# Patient Record
Sex: FEMALE | Race: BLACK OR AFRICAN AMERICAN | Employment: FULL TIME | URBAN - METROPOLITAN AREA
[De-identification: names, ages, dates, MRNs, and addresses within clinical notes are randomized per-mention and may not be internally consistent; named-entity substitution may affect disease eponyms.]

---

## 2017-07-19 ENCOUNTER — HOSPITAL ENCOUNTER (OUTPATIENT)
Dept: MAMMOGRAPHY | Age: 52
Discharge: HOME | End: 2017-07-19
Attending: FAMILY MEDICINE
Payer: COMMERCIAL

## 2017-07-19 DIAGNOSIS — Z12.31 VISIT FOR SCREENING MAMMOGRAM: ICD-10-CM

## 2017-07-19 PROCEDURE — 77067 SCR MAMMO BI INCL CAD: CPT

## 2018-10-08 ENCOUNTER — HOSPITAL ENCOUNTER (OUTPATIENT)
Dept: LAB | Age: 53
Discharge: HOME | End: 2018-10-08

## 2018-10-08 PROCEDURE — 88305 TISSUE EXAM BY PATHOLOGIST: CPT

## 2018-10-08 PROCEDURE — 88312 SPECIAL STAINS GROUP 1: CPT

## 2019-06-20 PROBLEM — M47.816 SPONDYLOSIS OF LUMBAR REGION WITHOUT MYELOPATHY OR RADICULOPATHY: Status: ACTIVE | Noted: 2018-06-28

## 2019-06-20 PROBLEM — M70.62 TROCHANTERIC BURSITIS OF BOTH HIPS: Status: ACTIVE | Noted: 2018-06-28

## 2019-06-20 PROBLEM — M70.61 TROCHANTERIC BURSITIS OF BOTH HIPS: Status: ACTIVE | Noted: 2018-06-28

## 2019-06-20 PROBLEM — M25.562 CHRONIC PAIN OF BOTH KNEES: Status: ACTIVE | Noted: 2018-06-28

## 2019-06-20 PROBLEM — M79.7 FIBROMYALGIA: Status: ACTIVE | Noted: 2018-06-28

## 2019-06-20 PROBLEM — G89.29 CHRONIC PAIN OF BOTH KNEES: Status: ACTIVE | Noted: 2018-06-28

## 2019-06-20 PROBLEM — M25.561 CHRONIC PAIN OF BOTH KNEES: Status: ACTIVE | Noted: 2018-06-28

## 2019-07-30 ENCOUNTER — HOSPITAL ENCOUNTER (OUTPATIENT)
Dept: MAMMOGRAPHY | Age: 54
Discharge: HOME | End: 2019-07-30
Attending: FAMILY MEDICINE
Payer: COMMERCIAL

## 2019-07-30 DIAGNOSIS — Z12.31 VISIT FOR SCREENING MAMMOGRAM: ICD-10-CM

## 2019-07-30 PROCEDURE — 77067 SCR MAMMO BI INCL CAD: CPT

## 2019-08-01 ENCOUNTER — HOSPITAL ENCOUNTER (OUTPATIENT)
Dept: MAMMOGRAPHY | Age: 54
Discharge: HOME | End: 2019-08-01
Attending: FAMILY MEDICINE
Payer: COMMERCIAL

## 2019-08-01 DIAGNOSIS — R92.8 ABNORMAL MAMMOGRAM: ICD-10-CM

## 2019-08-01 PROCEDURE — 77065 DX MAMMO INCL CAD UNI: CPT

## 2019-08-01 PROCEDURE — 76642 ULTRASOUND BREAST LIMITED: CPT

## 2019-08-06 ENCOUNTER — HOSPITAL ENCOUNTER (OUTPATIENT)
Dept: MAMMOGRAPHY | Age: 54
Discharge: HOME | End: 2019-08-06
Attending: FAMILY MEDICINE
Payer: COMMERCIAL

## 2019-08-06 VITALS — DIASTOLIC BLOOD PRESSURE: 65 MMHG | HEART RATE: 82 BPM | SYSTOLIC BLOOD PRESSURE: 121 MMHG

## 2019-08-06 DIAGNOSIS — R92.8 ABNORMAL MAMMOGRAM: ICD-10-CM

## 2019-08-06 DIAGNOSIS — N63.20 BREAST MASS, LEFT: ICD-10-CM

## 2019-08-06 PROCEDURE — 88305 TISSUE EXAM BY PATHOLOGIST: CPT

## 2019-08-06 PROCEDURE — 19083 BX BREAST 1ST LESION US IMAG: CPT

## 2019-08-06 PROCEDURE — 74011250636 HC RX REV CODE- 250/636: Performed by: FAMILY MEDICINE

## 2019-08-06 PROCEDURE — 77065 DX MAMMO INCL CAD UNI: CPT

## 2019-08-06 RX ORDER — LIDOCAINE HYDROCHLORIDE 10 MG/ML
4 INJECTION INFILTRATION; PERINEURAL
Status: COMPLETED | OUTPATIENT
Start: 2019-08-06 | End: 2019-08-06

## 2019-08-06 RX ADMIN — LIDOCAINE HYDROCHLORIDE 4 ML: 10 INJECTION, SOLUTION INFILTRATION; PERINEURAL at 08:27

## 2019-08-08 NOTE — PROGRESS NOTES
Dr Ryanne Borja and I spoke with Ms Linda Laboy and her daughter regarding the results of her Lt breast U/S bx. Pathology:IDC. She had no post bx issues. She had many questions regarding the next steps and also some other Physical issues that I told her needed to be addressed by her PCP. I gave them an info packet and she did not want prayer.    MRI        8/13  Ham   8/14  Jeramie Sanchez      8/15

## 2019-08-13 ENCOUNTER — HOSPITAL ENCOUNTER (OUTPATIENT)
Dept: MRI IMAGING | Age: 54
Discharge: HOME | End: 2019-08-13
Attending: FAMILY MEDICINE
Payer: COMMERCIAL

## 2019-08-13 DIAGNOSIS — C50.912 BREAST CANCER, LEFT (HCC): ICD-10-CM

## 2019-08-13 PROCEDURE — 74011000258 HC RX REV CODE- 258: Performed by: FAMILY MEDICINE

## 2019-08-13 PROCEDURE — A9575 INJ GADOTERATE MEGLUMI 0.1ML: HCPCS | Performed by: FAMILY MEDICINE

## 2019-08-13 PROCEDURE — 74011250636 HC RX REV CODE- 250/636: Performed by: FAMILY MEDICINE

## 2019-08-13 PROCEDURE — 77049 MRI BREAST C-+ W/CAD BI: CPT

## 2019-08-13 RX ORDER — SODIUM CHLORIDE 0.9 % (FLUSH) 0.9 %
10 SYRINGE (ML) INJECTION
Status: COMPLETED | OUTPATIENT
Start: 2019-08-13 | End: 2019-08-13

## 2019-08-13 RX ORDER — GADOTERATE MEGLUMINE 376.9 MG/ML
23 INJECTION INTRAVENOUS
Status: COMPLETED | OUTPATIENT
Start: 2019-08-13 | End: 2019-08-13

## 2019-08-13 RX ADMIN — Medication 10 ML: at 11:04

## 2019-08-13 RX ADMIN — GADOTERATE MEGLUMINE 23 ML: 376.9 INJECTION INTRAVENOUS at 11:04

## 2019-08-13 RX ADMIN — SODIUM CHLORIDE 100 ML: 900 INJECTION, SOLUTION INTRAVENOUS at 11:04

## 2019-08-14 PROBLEM — C50.412 MALIGNANT NEOPLASM OF UPPER-OUTER QUADRANT OF LEFT BREAST IN FEMALE, ESTROGEN RECEPTOR POSITIVE (HCC): Status: ACTIVE | Noted: 2019-08-14

## 2019-08-14 PROBLEM — R59.0 AXILLARY ADENOPATHY: Status: ACTIVE | Noted: 2019-08-14

## 2019-08-14 PROBLEM — C50.112 MALIGNANT NEOPLASM OF CENTRAL PORTION OF LEFT BREAST IN FEMALE, ESTROGEN RECEPTOR POSITIVE (HCC): Status: ACTIVE | Noted: 2019-08-14

## 2019-08-14 PROBLEM — Z17.0 MALIGNANT NEOPLASM OF UPPER-OUTER QUADRANT OF LEFT BREAST IN FEMALE, ESTROGEN RECEPTOR POSITIVE (HCC): Status: ACTIVE | Noted: 2019-08-14

## 2019-08-15 ENCOUNTER — PATIENT OUTREACH (OUTPATIENT)
Dept: CASE MANAGEMENT | Age: 54
End: 2019-08-15

## 2019-08-15 ENCOUNTER — HOSPITAL ENCOUNTER (OUTPATIENT)
Dept: LAB | Age: 54
Discharge: HOME | End: 2019-08-15
Payer: COMMERCIAL

## 2019-08-15 DIAGNOSIS — Z17.0 MALIGNANT NEOPLASM OF UPPER-OUTER QUADRANT OF LEFT BREAST IN FEMALE, ESTROGEN RECEPTOR POSITIVE (HCC): ICD-10-CM

## 2019-08-15 DIAGNOSIS — C50.412 MALIGNANT NEOPLASM OF UPPER-OUTER QUADRANT OF LEFT BREAST IN FEMALE, ESTROGEN RECEPTOR POSITIVE (HCC): ICD-10-CM

## 2019-08-15 LAB
ALBUMIN SERPL-MCNC: 4.1 G/DL (ref 3.5–5)
ALBUMIN/GLOB SERPL: 1 {RATIO} (ref 1.2–3.5)
ALP SERPL-CCNC: 67 U/L (ref 50–136)
ALT SERPL-CCNC: 30 U/L (ref 12–65)
ANION GAP SERPL CALC-SCNC: 8 MMOL/L (ref 7–16)
AST SERPL-CCNC: 21 U/L (ref 15–37)
BASOPHILS # BLD: 0 K/UL (ref 0–0.2)
BASOPHILS NFR BLD: 1 % (ref 0–2)
BILIRUB SERPL-MCNC: 0.7 MG/DL (ref 0.2–1.1)
BUN SERPL-MCNC: 9 MG/DL (ref 6–23)
CALCIUM SERPL-MCNC: 9.4 MG/DL (ref 8.3–10.4)
CANCER AG15-3 SERPL-ACNC: 12 U/ML (ref 1–35)
CHLORIDE SERPL-SCNC: 107 MMOL/L (ref 98–107)
CO2 SERPL-SCNC: 27 MMOL/L (ref 21–32)
CREAT SERPL-MCNC: 1.24 MG/DL (ref 0.6–1)
DIFFERENTIAL METHOD BLD: ABNORMAL
EOSINOPHIL # BLD: 0.1 K/UL (ref 0–0.8)
EOSINOPHIL NFR BLD: 3 % (ref 0.5–7.8)
ERYTHROCYTE [DISTWIDTH] IN BLOOD BY AUTOMATED COUNT: 13.3 % (ref 11.9–14.6)
GLOBULIN SER CALC-MCNC: 4.3 G/DL (ref 2.3–3.5)
GLUCOSE SERPL-MCNC: 107 MG/DL (ref 65–100)
HCT VFR BLD AUTO: 42 % (ref 35.8–46.3)
HGB BLD-MCNC: 14.4 G/DL (ref 11.7–15.4)
IMM GRANULOCYTES # BLD AUTO: 0 K/UL (ref 0–0.5)
IMM GRANULOCYTES NFR BLD AUTO: 0 % (ref 0–5)
LYMPHOCYTES # BLD: 1.3 K/UL (ref 0.5–4.6)
LYMPHOCYTES NFR BLD: 34 % (ref 13–44)
MCH RBC QN AUTO: 31.1 PG (ref 26.1–32.9)
MCHC RBC AUTO-ENTMCNC: 34.3 G/DL (ref 31.4–35)
MCV RBC AUTO: 90.7 FL (ref 79.6–97.8)
MONOCYTES # BLD: 0.2 K/UL (ref 0.1–1.3)
MONOCYTES NFR BLD: 6 % (ref 4–12)
NEUTS SEG # BLD: 2.2 K/UL (ref 1.7–8.2)
NEUTS SEG NFR BLD: 56 % (ref 43–78)
NRBC # BLD: 0 K/UL (ref 0–0.2)
PLATELET # BLD AUTO: 261 K/UL (ref 150–450)
PLATELET COMMENTS,PCOM: ADEQUATE
PMV BLD AUTO: 10.3 FL (ref 9.4–12.3)
POTASSIUM SERPL-SCNC: 3.5 MMOL/L (ref 3.5–5.1)
PROT SERPL-MCNC: 8.4 G/DL (ref 6.3–8.2)
RBC # BLD AUTO: 4.63 M/UL (ref 4.05–5.25)
RBC MORPH BLD: ABNORMAL
SODIUM SERPL-SCNC: 142 MMOL/L (ref 136–145)
WBC # BLD AUTO: 3.8 K/UL (ref 4.3–11.1)
WBC MORPH BLD: ABNORMAL

## 2019-08-15 PROCEDURE — 36415 COLL VENOUS BLD VENIPUNCTURE: CPT

## 2019-08-15 PROCEDURE — 86300 IMMUNOASSAY TUMOR CA 15-3: CPT

## 2019-08-15 PROCEDURE — 85025 COMPLETE CBC W/AUTO DIFF WBC: CPT

## 2019-08-15 PROCEDURE — 80053 COMPREHEN METABOLIC PANEL: CPT

## 2019-08-15 NOTE — PROGRESS NOTES
8/15/2019 Saw the patient with Dr Grayson Krabbe. She was recently diagnosed with invasive breast cancer that is ER/IN positive, Her 2 negative. She is scheduled for a biopsy of lymph node on the 19th and will then see Dr Javier Cole after biopsy. She works in the [employer] field. She found the breast mass with her monthly self-breast exam. She will follow up after surgery. Navigation information given to patient. Will continue to follow.

## 2019-08-16 LAB — CANCER AG27-29 SERPL-ACNC: 14.8 U/ML (ref 0–38.6)

## 2019-08-19 ENCOUNTER — HOSPITAL ENCOUNTER (OUTPATIENT)
Dept: MAMMOGRAPHY | Age: 54
Discharge: HOME | End: 2019-08-19
Attending: SURGERY
Payer: COMMERCIAL

## 2019-08-19 VITALS — DIASTOLIC BLOOD PRESSURE: 77 MMHG | SYSTOLIC BLOOD PRESSURE: 138 MMHG | HEART RATE: 69 BPM

## 2019-08-19 DIAGNOSIS — C50.112 MALIGNANT NEOPLASM OF CENTRAL PORTION OF LEFT BREAST IN FEMALE, ESTROGEN RECEPTOR POSITIVE (HCC): ICD-10-CM

## 2019-08-19 DIAGNOSIS — Z17.0 MALIGNANT NEOPLASM OF CENTRAL PORTION OF LEFT BREAST IN FEMALE, ESTROGEN RECEPTOR POSITIVE (HCC): ICD-10-CM

## 2019-08-19 PROCEDURE — 76882 US LMTD JT/FCL EVL NVASC XTR: CPT

## 2019-08-19 PROCEDURE — 38505 NEEDLE BIOPSY LYMPH NODES: CPT

## 2019-08-19 PROCEDURE — 88305 TISSUE EXAM BY PATHOLOGIST: CPT

## 2019-08-19 PROCEDURE — 74011250636 HC RX REV CODE- 250/636: Performed by: SURGERY

## 2019-08-19 RX ORDER — LIDOCAINE HYDROCHLORIDE 10 MG/ML
7 INJECTION INFILTRATION; PERINEURAL
Status: COMPLETED | OUTPATIENT
Start: 2019-08-19 | End: 2019-08-19

## 2019-08-19 RX ADMIN — LIDOCAINE HYDROCHLORIDE 7 ML: 10 INJECTION, SOLUTION INFILTRATION; PERINEURAL at 08:25

## 2019-09-04 RX ORDER — SODIUM CHLORIDE 0.9 % (FLUSH) 0.9 %
5-40 SYRINGE (ML) INJECTION EVERY 8 HOURS
Status: CANCELLED | OUTPATIENT
Start: 2019-09-04

## 2019-09-04 RX ORDER — SODIUM CHLORIDE 0.9 % (FLUSH) 0.9 %
5-40 SYRINGE (ML) INJECTION AS NEEDED
Status: CANCELLED | OUTPATIENT
Start: 2019-09-04

## 2019-09-11 ENCOUNTER — HOSPITAL ENCOUNTER (OUTPATIENT)
Dept: SURGERY | Age: 54
Discharge: HOME | End: 2019-09-11

## 2019-09-12 VITALS — WEIGHT: 240 LBS | HEIGHT: 67 IN | BODY MASS INDEX: 37.67 KG/M2

## 2019-09-12 NOTE — PERIOP NOTES
Patient verified name and . Order for consent IS found in EHR and matches case posting; patient verifies procedure. Type  1b surgery, Phone assessment complete. Orders were received. Labs per surgeon: none  Labs per anesthesia protocol: recent cbc,cmp in EMR dated 8/15/19 - within [facility] protocols. Patient answered medical/surgical history questions at their best of ability. All prior to admission medications documented in [facility]. Patient instructed to take the following medications the day of surgery according to anesthesia guidelines with a small sip of water: prilosec prn, zantac . Hold all vitamins 7 days prior to surgery and NSAIDS 5 days prior to surgery. Prescription meds to hold:none    Patient instructed on the following:  Arrive at [address], time of arrival to be called the day before by 1700  NPO after midnight including gum, mints, and ice chips  Responsible adult must drive patient to the hospital, stay during surgery, and patient will need supervision 24 hours after anesthesia  Use antibacterial soap in shower the night before surgery and on the morning of surgery  All piercings must be removed prior to arrival.    Leave all valuables (money and jewelry) at home but bring insurance card and ID on       DOS. Do not wear make-up, nail polish, lotions, cologne, perfumes, powders, or oil on skin. Patient teach back successful and patient demonstrates knowledge of instruction.

## 2019-09-17 ENCOUNTER — ANESTHESIA EVENT (OUTPATIENT)
Dept: SURGERY | Age: 54
End: 2019-09-17
Payer: COMMERCIAL

## 2019-09-17 NOTE — H&P
H&P/Consult Note/Progress Note/Office Note:   Ale Whiting  MRN: [id number]  :1965  Age:54 y.o.    HPI: Ale Whiting is a 48 y.o. female who is here for  left breast partial mastectomy (NL lumpectomy) and left axillary dissection on 19   for a cT1cN1 left breast IDC (low grade, ER+, Her2-). This originally presented with an abnormal screening mammogram with a left breast asymmetry. In retrospect she describes some mild discomfort of the medial left breast prior to the screening mammogram.  Nothing made it better or worse. The screening mammogram led to further diagnostic imaging and percutaneous biopsy followed by MRI shown below in detail in the expanded problem list.    She reported no prior h/o breast masses or nipple disharge.           Past Medical History:   Diagnosis Date    Breast cancer ([facility] [address]     Left    HTN (hypertension)     Refusal of blood transfusions as patient is [employer]      Past Surgical History:   Procedure Laterality Date    HX BREAST BIOPSY Left 2019    HX CHOLECYSTECTOMY      HX HYSTERECTOMY       Current Facility-Administered Medications   Medication Dose Route Frequency    lidocaine (XYLOCAINE) 10 mg/mL (1 %) injection 0.1 mL  0.1 mL SubCUTAneous PRN    lactated Ringers infusion  150 mL/hr IntraVENous CONTINUOUS    famotidine (PEPCID) tablet 20 mg  20 mg Oral ONCE    fentaNYL citrate (PF) injection 100 mcg  100 mcg IntraVENous ONCE    midazolam (VERSED) injection 2 mg  2 mg IntraVENous ONCE PRN    ceFAZolin (ANCEF) 2 g/20 mL in sterile water IV syringe  2 g IntraVENous ONCE    sodium chloride (NS) flush 5-40 mL  5-40 mL IntraVENous Q8H    sodium chloride (NS) flush 5-40 mL  5-40 mL IntraVENous PRN     Sulfa (sulfonamide antibiotics) and Adhesive  Social History     Socioeconomic History    Marital status:      Spouse name: Not on file    Number of children: Not on file    Years of education: Not on file    Highest education level: Not on file   Tobacco Use    Smoking status: Never Smoker    Smokeless tobacco: Never Used   Substance and Sexual Activity    Alcohol use: Never     Frequency: Never    Sexual activity: Yes     Partners: Male     Social History     Tobacco Use   Smoking Status Never Smoker   Smokeless Tobacco Never Used     Family History   Problem Relation Age of Onset    Dementia Mother     Heart Disease Father     Cancer Father         prostate    Stroke Neg Hx      ROS: The patient has no difficulty with chest pain or shortness of breath. No fever or chills. Comprehensive review of systems was otherwise unremarkable except as noted above. Physical Exam:   Visit Vitals  Ht 5' 7\" (1.702 m)   Wt 240 lb (108.9 kg)   BMI 37.59 kg/m²     Vitals:    09/18/19 0905   Weight: 240 lb (108.9 kg)   Height: 5' 7\" (1.702 m)     [unfilled]  [unfilled]    Constitutional: Alert, oriented, cooperative patient in no acute distress; appears stated age    Eyes:Sclera are clear. EOMs intact  ENMT: no external lesions gross hearing normal; no obvious neck masses, no ear or lip lesions, nares normal  CV: RRR. Normal perfusion  Resp: No JVD. Breathing is  non-labored; no audible wheezing. Breasts some mild bruising centrally in left breast  No palpable breast masses on either side  No regional adenopathy    GI: soft and non-distended     Musculoskeletal: unremarkable with normal function. No embolic signs or cyanosis. Neuro:  Oriented; moves all 4; no focal deficits  Psychiatric: normal affect and mood, no memory impairment    Recent vitals (if inpt):  @IPVITALS(24:)@    Labs:  No results for input(s): WBC, HGB, PLT, NA, K, CL, CO2, BUN, CREA, GLU, PTP, INR, APTT, TBIL, TBILI, CBIL, SGOT, GPT, ALT, AP, AML, AML, LPSE, LCAD, NH4, TROPT, TROIQ, PCO2, PO2, HCO3, HGBEXT, PLTEXT, HGBEXT, PLTEXT in the last 72 hours.     No lab exists for component:  PH, INREXT, INREXT    Lab Results   Component Value Date/Time WBC 3.8 (L) 08/15/2019 08:50 AM    HGB 14.4 08/15/2019 08:50 AM    PLATELET 950 16/01/3596 08:50 AM    Sodium 142 08/15/2019 08:50 AM    Potassium 3.5 08/15/2019 08:50 AM    Chloride 107 08/15/2019 08:50 AM    CO2 27 08/15/2019 08:50 AM    BUN 9 08/15/2019 08:50 AM    Creatinine 1.24 (H) 08/15/2019 08:50 AM    Glucose 107 (H) 08/15/2019 08:50 AM    Bilirubin, total 0.7 08/15/2019 08:50 AM    AST (SGOT) 21 08/15/2019 08:50 AM    ALT (SGPT) 30 08/15/2019 08:50 AM    Alk. phosphatase 67 08/15/2019 08:50 AM       CT Results  (Last 48 hours)    None        chest X-ray      I reviewed recent labs, recent radiologic studies, and pertinent records including other doctor notes if needed. I independently reviewed radiology images for studies I described above or studies I have ordered. Admission date (for inpatients): 9/18/2019   [unfilled]  [unfilled]    ASSESSMENT/PLAN:  Problem List  Date Reviewed: 9/18/2019          Codes Class Noted    Carcinoma of left breast metastatic to axillary lymph node [facility]) ICD-10-CM: C50.912, C77.3  ICD-9-CM: 174.9, 196.3  9/18/2019        * (Principal) Malignant neoplasm of central portion of left breast in female, estrogen receptor positive ([facility] ICD-10-CM: C50.112, Z17.0  ICD-9-CM: 174.1, V86.0  8/14/2019    Overview Addendum 8/22/2019  7:39 AM by Gracie Ortez MD     cT1cN1 left breast IDC    7/30/19 bilat screening mammo  There are scattered fibroglandular densities. In the left retroareolar breast there is a small focal asymmetry which is new from the previous images. There is otherwise no suspicious mass, calcification, or architectural distortion in either breast.       IMPRESSION: Left asymmetry for which further evaluation is recommended with ML  and compression magnification views, with possible ultrasound if indicated.       8/1/19 left dx mammo and US  Previously, an anterior left breast asymmetry was seen.  When compared to the ML  view, this resides at the 12:00 position of the left breast located 2 cm from  the nipple. Following compression, the previously described density persists. The margins are not clearly demonstrated which can be a worrisome indicator. No abnormal spiculation is seen.     IMPRESSION:  1. Persistent asymmetry following compression imaging. Further evaluation is  recommend with focused ultrasound imaging.     8/1/19 Left Breast US   Focused ultrasound imaging at the 12 o'clock position of the left breast in the  area of persistent asymmetry as seen on the recent diagnostic mammogram shows a  predominantly solid appearing mass measuring 1.2 cm x 1.2 cm x 0.9 cm. This  demonstrates an irregular margin, and taller than wide configuration on some  images. Some cystic components are seen and posterior acoustic enhancement is  seen which is not felt to indicate benignity in this case.     IMPRESSION:   1. 1.2 cm x 1.2 cm x 0.9 cm predominantly solid mass corresponding to the left  breast nodule which also demonstrates some worrisome features by mammography.       8/6/19 s/p US-guided left breast biopsy  DIAGNOSIS   A: \"LEFT BREAST, 12:00 POSITION, 2 CM FROM NIPPLE, CORE BIOPSY\": INFILTRATING DUCT CARCINOMA WITH FOCAL COLLOID FEATURES,   LOW GRADE (WELL DIFFERENTIATED). DEFINITE IN SITU COMPONENT AND LYMPHOVASCULAR INVASION ARE NOT IDENTIFIED. Electronically signed out on 8/7/2019 06:55 by YANNI Griffin M.D.   ER: Positive (100%); UT: Positive (13%); HER-2/CELY: Negative (1+)   Pre-bx US of the left axilla showed a few mildly prominent lymph nodes which contain normal fatty valeria. Post-bx mammo showing appropriate location of the clip.         8/13/19 Breast MRI  A skin marker has been placed at the 2:00 position of the left breast located 4  cm from the nipple marking the patient's biopsy site. The breasts are composed  of scattered fibroglandular elements.  A left axillary lymph node is seen on  axial postcontrast image 329, and sagittal postcontrast reconstructed image 97  located approximately 20 cm from the nipple which does not clearly demonstrate a  maintained fatty hilum and has a somewhat rounded appearance for which an early  metastatic lymph node cannot be excluded. No concerning axillary, or internal  mammary lymph nodes are otherwise seen. Evaluation of the lungs is limited by   MRI imaging. However, no obvious pulmonary masses are seen. No significant  pleural effusions are seen. The liver is obscured by cardiac motion artifact and  only partially visualized. However, no focal signal abnormalities are seen prior  to, or following administration of contrast to suggest a possible hepatic  lesion. Following the administration of intravenous contrast, normal enhancement is seen  of the heart and vascular structures of the breasts. Mild background physiologic  enhancement is seen of the breasts. A small irregularly marginated enhancing  mass is seen centered at the 11:00 position of the left breast 3 cm from the  nipple measuring 1.8 cm long by 1 cm craniocaudal by 1 cm wide consistent with  the patient's known neoplasm at this level. No additional enhancing mass or  concerning nonmasslike enhancement is seen of either breast. No evidence of skin  thickening, or nipple retraction is seen. No enhancing osseous lesion is seen.     IMPRESSION:  1.  1.8 cm x 1 cm x 1 cm enhancing mass centered at the 11:00 position of the  left breast 3 cm from the nipple consistent with the patient's known left breast neoplasm.     2. Dysmorphic appearing left axillary lymph node located 20 cm from the nipple  which cannot be confirmed to be benign with an early metastatic lymph node not  excluded given its appearance.  No evidence for potential metastatic disease is  otherwise seen in the visualized chest.       8/15/19 presented at [facility]; US-guided left axillary node biopsy next  If ax node + for met-->Left breast NL lumpectomy and axillary dissection  If ax node neg for met-->Left breast NL lumpectomy and sent node excision       8/19/19 left axillary node biopsy  DIAGNOSIS A: \"LEFT AXILLARY LYMPH NODE, CORE BIOPSY\":   MACRO METASTATIC CARCINOMA CONSISTENT WITH PRIOR CORE BIOPSY OF LEFT BREAST ([id number]) INVOLVING TISSUE CONSISTENT WITH NEEDLE BIOPSY OF LYMPH NODE. DEFINITE EXTRACAPSULAR EXTENSION IS NOT IDENTIFIED IN THIS MATERIAL BUT COULD BE PRESENT IN UNSAMPLED LYMPH NODE. SEE COMMENT   Comment   While metastatic carcinoma in this specimen is consistent with that in prior carcinoma from the left breast, other carcinomas could result in similar morphologic features. If clinically indicated special studies can be performed in an attempt to confirm origin of metastatic tumor   Electronically signed out on 8/20/2019 06:41 by YANNI Alaniz M.D.                      suspicious left axillary node by MRI ICD-10-CM: R59.0  ICD-9-CM: 785.6  8/14/2019        BMI 40.0-44.9, adult [facility]) ICD-10-CM: Z68.41  ICD-9-CM: V85.41  6/28/2018    Overview Signed 6/20/2019  2:47 PM by Julio Cesar Dejesus MD     Last Assessment & Plan:   BMI counseling: Due to this patient's BMI, I have provided counseling regarding physical activity.              Chronic pain of both knees ICD-10-CM: M25.561, M25.562, G89.29  ICD-9-CM: 719.46, 338.29  6/28/2018    Overview Signed 6/20/2019  2:47 PM by Julio Cesar Dejesus MD     Last Assessment & Plan:   Obtain imaging, encourage loosing weight             Fibromyalgia ICD-10-CM: M79.7  ICD-9-CM: 729.1  6/28/2018    Overview Signed 6/20/2019  2:47 PM by Julio Cesar Dejesus MD     Onset 2008  Dx by dr Lisa Roach   4/18 nl cbc, nl cmp, vt d 55, B 12 1119  Failed gabapentin, elavil very sleepy   6/28/18 cymbalta 20 mg     Last Assessment & Plan:   D/w patient about fibromyalgia as a diagnosis of exclusion  Maintain appropriated weight, perform regular low impact exercises  Recommend to f/u PCP, assess for need a sleep study, improve sleep  Se cymbalta explained             Spondylosis of lumbar region without myelopathy or radiculopathy ICD-10-CM: M47.816  ICD-9-CM: 721.3  6/28/2018    Overview Signed 6/20/2019  2:47 PM by Bertrand Chi MD     Last Assessment & Plan:   Obtain imaging, start thearpy             Trochanteric bursitis of both hips ICD-10-CM: M70.61, M70.62  ICD-9-CM: 726.5  6/28/2018    Overview Signed 6/20/2019  2:47 PM by Bertrand Chi MD     Last Assessment & Plan:   Start therapy                 Principal Problem:    Malignant neoplasm of central portion of left breast in female, estrogen receptor positive ([address] (8/14/2019)      Overview: cT1cN1 left breast IDC            7/30/19 bilat screening mammo      There are scattered fibroglandular densities. In the left retroareolar breast there is a small focal asymmetry which is       new from the previous images. There is otherwise no suspicious mass, calcification, or architectural       distortion in either breast.               IMPRESSION: Left asymmetry for which further evaluation is recommended       with ML      and compression magnification views, with possible ultrasound if       indicated.                   8/1/19 left dx mammo and US      Previously, an anterior left breast asymmetry was seen. When compared to       the ML      view, this resides at the 12:00 position of the left breast located 2 cm       from      the nipple. Following compression, the previously described density       persists. The margins are not clearly demonstrated which can be a worrisome       indicator. No abnormal spiculation is seen.             IMPRESSION:      1. Persistent asymmetry following compression imaging.   Further evaluation       is      recommend with focused ultrasound imaging.             8/1/19 Left Breast US       Focused ultrasound imaging at the 12 o'clock position of the left breast       in the      area of persistent asymmetry as seen on the recent diagnostic mammogram       shows a      predominantly solid appearing mass measuring 1.2 cm x 1.2 cm x 0.9 cm. This      demonstrates an irregular margin, and taller than wide configuration on       some      images. Some cystic components are seen and posterior acoustic enhancement       is      seen which is not felt to indicate benignity in this case.             IMPRESSION:       1. 1.2 cm x 1.2 cm x 0.9 cm predominantly solid mass corresponding to the       left      breast nodule which also demonstrates some worrisome features by       mammography.                   8/6/19 s/p US-guided left breast biopsy      DIAGNOSIS       A: \"LEFT BREAST, 12:00 POSITION, 2 CM FROM NIPPLE, CORE BIOPSY\":       INFILTRATING DUCT CARCINOMA WITH FOCAL COLLOID FEATURES,       LOW GRADE (WELL DIFFERENTIATED). DEFINITE IN SITU COMPONENT AND       LYMPHOVASCULAR INVASION ARE NOT IDENTIFIED. Electronically signed out on 8/7/2019 06:55 by YANNI Headley M.D.             ER: Positive (100%); OR: Positive (13%); HER-2/CELY: Negative (1+)       Pre-bx US of the left axilla showed a few mildly prominent lymph nodes       which contain normal fatty valeria. Post-bx mammo showing appropriate location of the clip.                     8/13/19 Breast MRI      A skin marker has been placed at the 2:00 position of the left breast       located 4      cm from the nipple marking the patient's biopsy site. The breasts are       composed      of scattered fibroglandular elements. A left axillary lymph node is seen       on      axial postcontrast image 329, and sagittal postcontrast reconstructed       image 97      located approximately 20 cm from the nipple which does not clearly       demonstrate a      maintained fatty hilum and has a somewhat rounded appearance for which an       early      metastatic lymph node cannot be excluded.  No concerning axillary, or       internal      mammary lymph nodes are otherwise seen. Evaluation of the lungs is limited       by       MRI imaging. However, no obvious pulmonary masses are seen. No significant      pleural effusions are seen. The liver is obscured by cardiac motion       artifact and      only partially visualized. However, no focal signal abnormalities are seen       prior      to, or following administration of contrast to suggest a possible hepatic      lesion. Following the administration of intravenous contrast, normal enhancement       is seen      of the heart and vascular structures of the breasts. Mild background       physiologic      enhancement is seen of the breasts. A small irregularly marginated       enhancing      mass is seen centered at the 11:00 position of the left breast 3 cm from       the      nipple measuring 1.8 cm long by 1 cm craniocaudal by 1 cm wide consistent       with      the patient's known neoplasm at this level. No additional enhancing mass       or      concerning nonmasslike enhancement is seen of either breast. No evidence       of skin      thickening, or nipple retraction is seen. No enhancing osseous lesion is       seen.             IMPRESSION:      1.  1.8 cm x 1 cm x 1 cm enhancing mass centered at the 11:00 position of       the      left breast 3 cm from the nipple consistent with the patient's known left       breast neoplasm.             2. Dysmorphic appearing left axillary lymph node located 20 cm from the       nipple      which cannot be confirmed to be benign with an early metastatic lymph node       not      excluded given its appearance.  No evidence for potential metastatic       disease is      otherwise seen in the visualized chest.                   8/15/19 presented at [facility]; US-guided left axillary node biopsy next      If ax node + for met-->Left breast NL lumpectomy and axillary dissection      If ax node neg for met-->Left breast NL lumpectomy and sent node excision 8/19/19 left axillary node biopsy      DIAGNOSIS A: \"LEFT AXILLARY LYMPH NODE, CORE BIOPSY\":       MACRO METASTATIC CARCINOMA CONSISTENT WITH PRIOR CORE BIOPSY OF LEFT       BREAST ([id number]) INVOLVING TISSUE CONSISTENT WITH NEEDLE BIOPSY OF       LYMPH NODE. DEFINITE EXTRACAPSULAR EXTENSION IS NOT IDENTIFIED IN THIS       MATERIAL BUT COULD BE PRESENT IN UNSAMPLED LYMPH NODE. SEE COMMENT       Comment       While metastatic carcinoma in this specimen is consistent with that in       prior carcinoma from the left breast, other carcinomas could result in       similar morphologic features. If clinically indicated special studies can       be performed in an attempt to confirm origin of metastatic tumor       Electronically signed out on 8/20/2019 06:41 by YANNI Mtz M.D. Active Problems:    suspicious left axillary node by MRI (8/14/2019)      Carcinoma of left breast metastatic to axillary lymph node ([address] (9/18/2019)           cT1cN1 left breast IDC with suspicious left axillary node by MRI  ER+, Her2-, low grade  US-guided biopsy of left axillary node was + for metastasis pre-op. Her case has been [facility] pre[facility]. She met with Dr Familia Trujillo of medical oncology pre-op as well. We discussed surgical options for her breast including partial mastectomy (NL lumpectomy)/XRT vs mastectomy. She is a candidate for breast preservation surgery. Advantages/disadavantages and risks/benefits of each discussed. She is favors partial mastectomy (NL lumpectomy)/XRT    Informed consent obtained for left breast partial mastectomy (NL lumpectomy) and left axillary dissection. We will schedule this for her soon. She is a [employer] and wants no blood products or blood transfusions under any circumstances. I have personally performed a face-to-face diagnostic evaluation and management  service on this patient.       I have independently seen the patient. I have independently obtained the above history from the patient/family. I have independently examined the patient with above findings. I have independently reviewed data/labs for this patient and developed the above plan of care (MDM). Signed: Lewis Granados.  Bo Sosa MD, FACS

## 2019-09-18 ENCOUNTER — HOSPITAL ENCOUNTER (OUTPATIENT)
Age: 54
Setting detail: OUTPATIENT SURGERY
Discharge: HOME | End: 2019-09-18
Attending: SURGERY | Admitting: SURGERY
Payer: COMMERCIAL

## 2019-09-18 ENCOUNTER — APPOINTMENT (OUTPATIENT)
Dept: MAMMOGRAPHY | Age: 54
End: 2019-09-18
Attending: SURGERY
Payer: COMMERCIAL

## 2019-09-18 ENCOUNTER — ANESTHESIA (OUTPATIENT)
Dept: SURGERY | Age: 54
End: 2019-09-18
Payer: COMMERCIAL

## 2019-09-18 VITALS
WEIGHT: 240 LBS | BODY MASS INDEX: 37.67 KG/M2 | SYSTOLIC BLOOD PRESSURE: 107 MMHG | HEIGHT: 67 IN | RESPIRATION RATE: 16 BRPM | OXYGEN SATURATION: 97 % | DIASTOLIC BLOOD PRESSURE: 64 MMHG | HEART RATE: 64 BPM | TEMPERATURE: 98.6 F

## 2019-09-18 DIAGNOSIS — Z17.0 MALIGNANT NEOPLASM OF UPPER-OUTER QUADRANT OF LEFT BREAST IN FEMALE, ESTROGEN RECEPTOR POSITIVE (HCC): ICD-10-CM

## 2019-09-18 DIAGNOSIS — R59.0 AXILLARY ADENOPATHY: ICD-10-CM

## 2019-09-18 DIAGNOSIS — C50.112 MALIGNANT NEOPLASM OF CENTRAL PORTION OF LEFT BREAST IN FEMALE, ESTROGEN RECEPTOR POSITIVE (HCC): ICD-10-CM

## 2019-09-18 DIAGNOSIS — C50.412 MALIGNANT NEOPLASM OF UPPER-OUTER QUADRANT OF LEFT BREAST IN FEMALE, ESTROGEN RECEPTOR POSITIVE (HCC): ICD-10-CM

## 2019-09-18 DIAGNOSIS — Z17.0 MALIGNANT NEOPLASM OF CENTRAL PORTION OF LEFT BREAST IN FEMALE, ESTROGEN RECEPTOR POSITIVE (HCC): ICD-10-CM

## 2019-09-18 PROBLEM — C50.912 CARCINOMA OF LEFT BREAST METASTATIC TO AXILLARY LYMPH NODE (HCC): Status: ACTIVE | Noted: 2019-09-18

## 2019-09-18 PROBLEM — C77.3 CARCINOMA OF LEFT BREAST METASTATIC TO AXILLARY LYMPH NODE (HCC): Status: ACTIVE | Noted: 2019-09-18

## 2019-09-18 PROCEDURE — 76210000006 HC OR PH I REC 0.5 TO 1 HR: Performed by: SURGERY

## 2019-09-18 PROCEDURE — 77030003029 HC SUT VCRL J&J -B: Performed by: SURGERY

## 2019-09-18 PROCEDURE — 77030038552 HC DRN WND MDII -A: Performed by: SURGERY

## 2019-09-18 PROCEDURE — 19285 PERQ DEV BREAST 1ST US IMAG: CPT

## 2019-09-18 PROCEDURE — 77030018836 HC SOL IRR NACL ICUM -A: Performed by: SURGERY

## 2019-09-18 PROCEDURE — 74011250636 HC RX REV CODE- 250/636: Performed by: ANESTHESIOLOGY

## 2019-09-18 PROCEDURE — 76210000020 HC REC RM PH II FIRST 0.5 HR: Performed by: SURGERY

## 2019-09-18 PROCEDURE — 77030016570 HC BLNKT BAIR HGGR 3M -B: Performed by: NURSE ANESTHETIST, CERTIFIED REGISTERED

## 2019-09-18 PROCEDURE — 88309 TISSUE EXAM BY PATHOLOGIST: CPT

## 2019-09-18 PROCEDURE — 77065 DX MAMMO INCL CAD UNI: CPT

## 2019-09-18 PROCEDURE — 76060000036 HC ANESTHESIA 2.5 TO 3 HR: Performed by: SURGERY

## 2019-09-18 PROCEDURE — 76010000172 HC OR TIME 2.5 TO 3 HR INTENSV-TIER 1: Performed by: SURGERY

## 2019-09-18 PROCEDURE — 74011000250 HC RX REV CODE- 250

## 2019-09-18 PROCEDURE — 77030013567 HC DRN WND RESERV BARD -A: Performed by: SURGERY

## 2019-09-18 PROCEDURE — 77030040361 HC SLV COMPR DVT MDII -B: Performed by: SURGERY

## 2019-09-18 PROCEDURE — 74011250637 HC RX REV CODE- 250/637: Performed by: ANESTHESIOLOGY

## 2019-09-18 PROCEDURE — 74011000250 HC RX REV CODE- 250: Performed by: SURGERY

## 2019-09-18 PROCEDURE — 77030010514 HC APPL CLP LIG COVD -B: Performed by: SURGERY

## 2019-09-18 PROCEDURE — 77030002996 HC SUT SLK J&J -A: Performed by: SURGERY

## 2019-09-18 PROCEDURE — 74011250636 HC RX REV CODE- 250/636

## 2019-09-18 PROCEDURE — 77030010512 HC APPL CLP LIG J&J -C: Performed by: SURGERY

## 2019-09-18 PROCEDURE — 88305 TISSUE EXAM BY PATHOLOGIST: CPT

## 2019-09-18 PROCEDURE — 77030039425 HC BLD LARYNG TRULITE DISP TELE -A: Performed by: NURSE ANESTHETIST, CERTIFIED REGISTERED

## 2019-09-18 PROCEDURE — 77030008462 HC STPLR SKN PROX J&J -A: Performed by: SURGERY

## 2019-09-18 PROCEDURE — 77030037088 HC TUBE ENDOTRACH ORAL NSL COVD-A: Performed by: NURSE ANESTHETIST, CERTIFIED REGISTERED

## 2019-09-18 PROCEDURE — 74011250636 HC RX REV CODE- 250/636: Performed by: SURGERY

## 2019-09-18 RX ORDER — HYDROCODONE BITARTRATE AND ACETAMINOPHEN 5; 325 MG/1; MG/1
1 TABLET ORAL AS NEEDED
Status: DISCONTINUED | OUTPATIENT
Start: 2019-09-18 | End: 2019-09-18 | Stop reason: HOSPADM

## 2019-09-18 RX ORDER — GABAPENTIN 300 MG/1
300 CAPSULE ORAL ONCE
Status: COMPLETED | OUTPATIENT
Start: 2019-09-18 | End: 2019-09-18

## 2019-09-18 RX ORDER — MIDAZOLAM HYDROCHLORIDE 1 MG/ML
2 INJECTION, SOLUTION INTRAMUSCULAR; INTRAVENOUS
Status: COMPLETED | OUTPATIENT
Start: 2019-09-18 | End: 2019-09-18

## 2019-09-18 RX ORDER — HYDROMORPHONE HYDROCHLORIDE 2 MG/ML
0.5 INJECTION, SOLUTION INTRAMUSCULAR; INTRAVENOUS; SUBCUTANEOUS
Status: DISCONTINUED | OUTPATIENT
Start: 2019-09-18 | End: 2019-09-18 | Stop reason: HOSPADM

## 2019-09-18 RX ORDER — ROCURONIUM BROMIDE 10 MG/ML
INJECTION, SOLUTION INTRAVENOUS AS NEEDED
Status: DISCONTINUED | OUTPATIENT
Start: 2019-09-18 | End: 2019-09-18 | Stop reason: HOSPADM

## 2019-09-18 RX ORDER — BUPIVACAINE HYDROCHLORIDE 2.5 MG/ML
INJECTION, SOLUTION EPIDURAL; INFILTRATION; INTRACAUDAL AS NEEDED
Status: DISCONTINUED | OUTPATIENT
Start: 2019-09-18 | End: 2019-09-18 | Stop reason: HOSPADM

## 2019-09-18 RX ORDER — SODIUM CHLORIDE, SODIUM LACTATE, POTASSIUM CHLORIDE, CALCIUM CHLORIDE 600; 310; 30; 20 MG/100ML; MG/100ML; MG/100ML; MG/100ML
150 INJECTION, SOLUTION INTRAVENOUS CONTINUOUS
Status: DISCONTINUED | OUTPATIENT
Start: 2019-09-18 | End: 2019-09-18 | Stop reason: HOSPADM

## 2019-09-18 RX ORDER — LIDOCAINE HYDROCHLORIDE 20 MG/ML
INJECTION, SOLUTION EPIDURAL; INFILTRATION; INTRACAUDAL; PERINEURAL AS NEEDED
Status: DISCONTINUED | OUTPATIENT
Start: 2019-09-18 | End: 2019-09-18 | Stop reason: HOSPADM

## 2019-09-18 RX ORDER — PROPOFOL 10 MG/ML
INJECTION, EMULSION INTRAVENOUS AS NEEDED
Status: DISCONTINUED | OUTPATIENT
Start: 2019-09-18 | End: 2019-09-18 | Stop reason: HOSPADM

## 2019-09-18 RX ORDER — GLYCOPYRROLATE 0.2 MG/ML
INJECTION INTRAMUSCULAR; INTRAVENOUS AS NEEDED
Status: DISCONTINUED | OUTPATIENT
Start: 2019-09-18 | End: 2019-09-18 | Stop reason: HOSPADM

## 2019-09-18 RX ORDER — ACETAMINOPHEN 500 MG
1000 TABLET ORAL
Status: DISCONTINUED | OUTPATIENT
Start: 2019-09-18 | End: 2019-09-18 | Stop reason: HOSPADM

## 2019-09-18 RX ORDER — SODIUM CHLORIDE 9 MG/ML
50 INJECTION, SOLUTION INTRAVENOUS CONTINUOUS
Status: DISCONTINUED | OUTPATIENT
Start: 2019-09-18 | End: 2019-09-18 | Stop reason: HOSPADM

## 2019-09-18 RX ORDER — SODIUM CHLORIDE 0.9 % (FLUSH) 0.9 %
5-40 SYRINGE (ML) INJECTION AS NEEDED
Status: DISCONTINUED | OUTPATIENT
Start: 2019-09-18 | End: 2019-09-18 | Stop reason: HOSPADM

## 2019-09-18 RX ORDER — LIDOCAINE HYDROCHLORIDE 10 MG/ML
6 INJECTION INFILTRATION; PERINEURAL
Status: COMPLETED | OUTPATIENT
Start: 2019-09-18 | End: 2019-09-18

## 2019-09-18 RX ORDER — LIDOCAINE HYDROCHLORIDE 10 MG/ML
0.1 INJECTION INFILTRATION; PERINEURAL AS NEEDED
Status: DISCONTINUED | OUTPATIENT
Start: 2019-09-18 | End: 2019-09-18 | Stop reason: HOSPADM

## 2019-09-18 RX ORDER — FAMOTIDINE 20 MG/1
20 TABLET, FILM COATED ORAL ONCE
Status: DISCONTINUED | OUTPATIENT
Start: 2019-09-18 | End: 2019-09-18 | Stop reason: HOSPADM

## 2019-09-18 RX ORDER — SUCCINYLCHOLINE CHLORIDE 20 MG/ML
INJECTION INTRAMUSCULAR; INTRAVENOUS AS NEEDED
Status: DISCONTINUED | OUTPATIENT
Start: 2019-09-18 | End: 2019-09-18 | Stop reason: HOSPADM

## 2019-09-18 RX ORDER — FENTANYL CITRATE 50 UG/ML
INJECTION, SOLUTION INTRAMUSCULAR; INTRAVENOUS AS NEEDED
Status: DISCONTINUED | OUTPATIENT
Start: 2019-09-18 | End: 2019-09-18 | Stop reason: HOSPADM

## 2019-09-18 RX ORDER — SODIUM CHLORIDE 0.9 % (FLUSH) 0.9 %
5-40 SYRINGE (ML) INJECTION EVERY 8 HOURS
Status: DISCONTINUED | OUTPATIENT
Start: 2019-09-18 | End: 2019-09-18 | Stop reason: HOSPADM

## 2019-09-18 RX ORDER — EPHEDRINE SULFATE 50 MG/ML
INJECTION, SOLUTION INTRAVENOUS AS NEEDED
Status: DISCONTINUED | OUTPATIENT
Start: 2019-09-18 | End: 2019-09-18 | Stop reason: HOSPADM

## 2019-09-18 RX ORDER — KETOROLAC TROMETHAMINE 30 MG/ML
INJECTION, SOLUTION INTRAMUSCULAR; INTRAVENOUS AS NEEDED
Status: DISCONTINUED | OUTPATIENT
Start: 2019-09-18 | End: 2019-09-18 | Stop reason: HOSPADM

## 2019-09-18 RX ORDER — FENTANYL CITRATE 50 UG/ML
100 INJECTION, SOLUTION INTRAMUSCULAR; INTRAVENOUS ONCE
Status: DISCONTINUED | OUTPATIENT
Start: 2019-09-18 | End: 2019-09-18 | Stop reason: HOSPADM

## 2019-09-18 RX ORDER — ONDANSETRON 2 MG/ML
INJECTION INTRAMUSCULAR; INTRAVENOUS AS NEEDED
Status: DISCONTINUED | OUTPATIENT
Start: 2019-09-18 | End: 2019-09-18 | Stop reason: HOSPADM

## 2019-09-18 RX ORDER — DEXAMETHASONE SODIUM PHOSPHATE 4 MG/ML
INJECTION, SOLUTION INTRA-ARTICULAR; INTRALESIONAL; INTRAMUSCULAR; INTRAVENOUS; SOFT TISSUE AS NEEDED
Status: DISCONTINUED | OUTPATIENT
Start: 2019-09-18 | End: 2019-09-18 | Stop reason: HOSPADM

## 2019-09-18 RX ORDER — CEFAZOLIN SODIUM/WATER 2 G/20 ML
2 SYRINGE (ML) INTRAVENOUS ONCE
Status: COMPLETED | OUTPATIENT
Start: 2019-09-18 | End: 2019-09-18

## 2019-09-18 RX ORDER — ACETAMINOPHEN 500 MG
1000 TABLET ORAL ONCE
Status: COMPLETED | OUTPATIENT
Start: 2019-09-18 | End: 2019-09-18

## 2019-09-18 RX ADMIN — MIDAZOLAM 2 MG: 1 INJECTION INTRAMUSCULAR; INTRAVENOUS at 12:15

## 2019-09-18 RX ADMIN — KETOROLAC TROMETHAMINE 30 MG: 30 INJECTION, SOLUTION INTRAMUSCULAR; INTRAVENOUS at 15:48

## 2019-09-18 RX ADMIN — HYDROMORPHONE HYDROCHLORIDE 0.5 MG: 2 INJECTION INTRAMUSCULAR; INTRAVENOUS; SUBCUTANEOUS at 16:19

## 2019-09-18 RX ADMIN — ONDANSETRON 4 MG: 2 INJECTION INTRAMUSCULAR; INTRAVENOUS at 13:17

## 2019-09-18 RX ADMIN — DEXAMETHASONE SODIUM PHOSPHATE 10 MG: 4 INJECTION, SOLUTION INTRA-ARTICULAR; INTRALESIONAL; INTRAMUSCULAR; INTRAVENOUS; SOFT TISSUE at 13:17

## 2019-09-18 RX ADMIN — EPHEDRINE SULFATE 10 MG: 50 INJECTION, SOLUTION INTRAVENOUS at 14:46

## 2019-09-18 RX ADMIN — Medication 2 G: at 13:20

## 2019-09-18 RX ADMIN — ROCURONIUM BROMIDE 5 MG: 10 INJECTION, SOLUTION INTRAVENOUS at 13:14

## 2019-09-18 RX ADMIN — GLYCOPYRROLATE 0.2 MG: 0.2 INJECTION INTRAMUSCULAR; INTRAVENOUS at 13:24

## 2019-09-18 RX ADMIN — SUCCINYLCHOLINE CHLORIDE 150 MG: 20 INJECTION INTRAMUSCULAR; INTRAVENOUS at 13:14

## 2019-09-18 RX ADMIN — HYDROMORPHONE HYDROCHLORIDE 0.5 MG: 2 INJECTION INTRAMUSCULAR; INTRAVENOUS; SUBCUTANEOUS at 16:09

## 2019-09-18 RX ADMIN — ACETAMINOPHEN 1000 MG: 500 TABLET, FILM COATED ORAL at 12:15

## 2019-09-18 RX ADMIN — LIDOCAINE HYDROCHLORIDE 6 ML: 10 INJECTION, SOLUTION INFILTRATION; PERINEURAL at 10:54

## 2019-09-18 RX ADMIN — HYDROCODONE BITARTRATE AND ACETAMINOPHEN 1 TABLET: 5; 325 TABLET ORAL at 17:05

## 2019-09-18 RX ADMIN — SODIUM CHLORIDE, SODIUM LACTATE, POTASSIUM CHLORIDE, AND CALCIUM CHLORIDE: 600; 310; 30; 20 INJECTION, SOLUTION INTRAVENOUS at 13:07

## 2019-09-18 RX ADMIN — LIDOCAINE HYDROCHLORIDE 60 MG: 20 INJECTION, SOLUTION EPIDURAL; INFILTRATION; INTRACAUDAL; PERINEURAL at 13:14

## 2019-09-18 RX ADMIN — FENTANYL CITRATE 100 MCG: 50 INJECTION, SOLUTION INTRAMUSCULAR; INTRAVENOUS at 13:14

## 2019-09-18 RX ADMIN — PROPOFOL 200 MG: 10 INJECTION, EMULSION INTRAVENOUS at 13:14

## 2019-09-18 RX ADMIN — GABAPENTIN 300 MG: 300 CAPSULE ORAL at 12:15

## 2019-09-18 NOTE — PERIOP NOTES
[address] September 18, 2019       RE: [facility]      To Whom It May Concern,    This is to certify that Joe Eugene was present for procedure on 09/18/2019 at [facility].     Please feel free to contact my office if you have any questions or concerns. Thank you for your assistance in this matter.       Sincerely,  Darnell Damian RN

## 2019-09-18 NOTE — BRIEF OP NOTE
BRIEF OPERATIVE NOTE    Date of Procedure:  9/18/2019    Preoperative Diagnosis: Left breast carcinoma with left axillary metastasis  Postoperative Diagnosis: same    Procedure:   Left partial mastectomy  Left axillary dissection     Surgeon(s) and Role:     * Ilsa Hernandez MD - Primary  Anesthesia: General  Estimated Blood Loss: <30cc  Specimens:   ID Type Source Tests Collected by Time Destination   1 : left partial masctectomy Needle Loc Breast Mass, Left  Ilsa Hernandez MD 9/18/2019 1339 Pathology   2 : final medial margin left breast Preservative   Ilsa Hernandez MD 9/18/2019 1348 Pathology   3 : final lateral margin left breast Preservative   Ilsa Hernandez MD 9/18/2019 1349 Pathology   4 : final superior margin left breast Preservative   Ilsa Hernandez MD 9/18/2019 1349 Pathology   5 : final inferior margin left breast Preservative   Ilsa Hernandez MD 9/18/2019 1350 Pathology   6 : final deep margin left breast Preservative   Ilsa Hernandez MD 9/18/2019 1350 Pathology   7 : final anterior margin left breast Preservative   Ilsa Hernandez MD 9/18/2019 1350 Pathology   8 : Left Axillary Dissection Fresh Axillary  Ilsa Hernandez MD 9/18/2019 1531 Pathology     Findings: see op note   Complications: none  Implants: * No implants in log *

## 2019-09-18 NOTE — ANESTHESIA PREPROCEDURE EVALUATION
Relevant Problems   No relevant active problems       Anesthetic History   No history of anesthetic complications            Review of Systems / Medical History  Patient summary reviewed and pertinent labs reviewed    Pulmonary  Within defined limits                 Neuro/Psych   Within defined limits           Cardiovascular    Hypertension: well controlled              Exercise tolerance: >4 METS     GI/Hepatic/Renal     GERD: well controlled           Endo/Other        Obesity and arthritis     Other Findings              Physical Exam    Airway  Mallampati: II  TM Distance: > 6 cm  Neck ROM: normal range of motion   Mouth opening: Normal     Cardiovascular    Rhythm: regular  Rate: normal         Dental  No notable dental hx       Pulmonary  Breath sounds clear to auscultation               Abdominal         Other Findings            Anesthetic Plan    ASA: 2  Anesthesia type: general            Anesthetic plan and risks discussed with: Patient and Family

## 2019-09-18 NOTE — DISCHARGE INSTRUCTIONS
Empty the drain as needed and keep it suctioning (compressed) at all times  Change the dry gauze and tape around the drain ext site as needed. Try to keep incision(s) as dry as possible to lower risk of infection. No heavy lifting (>10lbs). No driving for now.     Pain prescription (Norco) on chart for patient to use as needed for pain  Follow-up with Dr Bo Sosa 12 days on a Monday in the office at:  [address], [address]  (Call for an appt time-->[phone]-->option 1)

## 2019-09-18 NOTE — ANESTHESIA POSTPROCEDURE EVALUATION
Procedure(s):  LEFT BREAST NEEDLE LOCALIZED BIOPSY   LEFT AXILLARY DISSECTION. general    Anesthesia Post Evaluation      Multimodal analgesia: multimodal analgesia used between 6 hours prior to anesthesia start to PACU discharge  Patient location during evaluation: bedside  Patient participation: complete - patient participated  Level of consciousness: awake and alert  Pain score: 1  Pain management: adequate  Airway patency: patent  Anesthetic complications: no  Cardiovascular status: acceptable  Respiratory status: acceptable  Hydration status: acceptable  Comments: Pt doing well. Ok to d/c home.    Post anesthesia nausea and vomiting:  none      Vitals Value Taken Time   /65 9/18/2019  4:30 PM   Temp 36.8 °C (98.2 °F) 9/18/2019  3:58 PM   Pulse 78 9/18/2019  4:30 PM   Resp 16 9/18/2019  4:30 PM   SpO2 98 % 9/18/2019  4:30 PM

## 2019-09-19 NOTE — OP NOTES
[place]  OPERATIVE REPORT    Name:  Gregory Espinal  MR#:  [id number]  :  1965  ACCOUNT #:  [de-identified]  DATE OF SERVICE:  2019    PREOPERATIVE DIAGNOSIS:  Clinical T1c N1 left breast carcinoma with proven axillary metastasis by percutaneous biopsy. POSTOPERATIVE DIAGNOSIS:  Clinical T1c N1 left breast carcinoma with proven axillary metastasis by percutaneous biopsy. PROCEDURE PERFORMED:  Left partial mastectomy with left axillary dissection (ALND -level 1 and level 2 dissection), (CPT 14298). SURGEON:  Zackary Perkins. Toni Jade MD    ASSISTANT:  None. ANESTHESIA:  General.    COMPLICATIONS:  None. SPECIMENS REMOVED:  Partial mastectomy with final shaved margins as well as left axillary dissection all sent to Pathology for review. IMPLANTS:  19 round Leonel drain. ESTIMATED BLOOD LOSS:  Less than 75 mL. OPERATIVE PROCEDURE:  The patient was taken to the operating room, placed in the supine position and after adequate anesthesia was given, her left breast and left axilla were prepped and draped in a sterile fashion with multiple layers of Betadine scrub and Betadine solution. Time-out protocol was followed. She was given prophylactic antibiotics. A generous curvilinear incision was made along the upper border of the areola offset from the areola by about 1 cm. This incision extended from about 10 o'clock to 1 o'clock. We performed a generous partial mastectomy of the entire upper 10 o'clock to 1 o'clock area of the breast incorporating all the tissue around the distal aspect of the guidewire. There was a shallow mass, which was palpable. We excised all of the tissue anterior to the specimen. A generous partial mastectomy was performed. This specimen was marked for orientation with a short suture at the superior margin and a long suture at the lateral margin.   It was imaged in two views while the patient was under anesthesia and contained the guidewire, biopsy clip, and radiographic target. It was approved by Radiology and sent to Pathology for review. Irrigation was used. Hemostasis was confirmed. There was no evidence of gross or palpable disease remaining. We sent final shaved margins from the superior, inferior, medial, lateral, deep, and anterior margins. These were all sent separately marked to Pathology. Local anesthetic was given. Interrupted deep dermal 3-0 Vicryl sutures were placed. The skin was closed with clips. A sterile dressing was placed. We then turned our attention to the axillary dissection and made a counterincision in the left axilla, which was obliquely oriented. We dissected down to the lateral border of the pectoralis major. We removed the adipose tissue from the lateral aspect of the pectoralis major and dissected into the deep left axillary space and identified the left axillary vein. We kept dissection inferior to the axillary vein. We removed all the tissue inferior to axillary vein preserving the long thoracic and thoracodorsal nerves, which were carefully preserved and dissected during the case. We dissected medially to the medial border of the pec minor and removed all the tissue from the lateral aspect of the chest wall all the way to the lateral border of the latissimus and all the way down to the latissimus. Lymphatic and venous tributaries were clipped. Dissection was very tedious and took a great amount of time. The axillary contents were sent to Pathology for review. There was no evidence of gross or palpable disease remaining in the axilla following the excision. A 19 round Leonel drain was brought in through a counterincision and placed in the left axilla. Irrigation was once again used. Hemostasis was confirmed. The long thoracic and thoracodorsal nerves were tested and were intact. Local anesthetic was given. Interrupted deep dermal Vicryl sutures were placed to close the incision as well as skin clips. A 2-0 silk was used to secure the drain at its exit site. Sterile dressings were placed over both incisions consisting of dry 4 x 4s and Tegaderm. The patient was taken to Recovery in good condition.       Paolo Groves MD [id number]  D:  09/19/2019 7:09  T:  09/19/2019 7:17  JOB #:  [id number]

## 2019-09-24 ENCOUNTER — PATIENT OUTREACH (OUTPATIENT)
Dept: CASE MANAGEMENT | Age: 54
End: 2019-09-24

## 2019-09-25 PROBLEM — I10 ESSENTIAL HYPERTENSION: Status: ACTIVE | Noted: 2019-09-25

## 2019-10-14 ENCOUNTER — HOSPITAL ENCOUNTER (OUTPATIENT)
Dept: LAB | Age: 54
Discharge: HOME | End: 2019-10-14
Payer: COMMERCIAL

## 2019-10-14 DIAGNOSIS — C50.112 MALIGNANT NEOPLASM OF CENTRAL PORTION OF LEFT BREAST IN FEMALE, ESTROGEN RECEPTOR POSITIVE (HCC): ICD-10-CM

## 2019-10-14 DIAGNOSIS — Z17.0 MALIGNANT NEOPLASM OF CENTRAL PORTION OF LEFT BREAST IN FEMALE, ESTROGEN RECEPTOR POSITIVE (HCC): ICD-10-CM

## 2019-10-14 LAB
ALBUMIN SERPL-MCNC: 3.8 G/DL (ref 3.5–5)
ALBUMIN/GLOB SERPL: 0.9 {RATIO} (ref 1.2–3.5)
ALP SERPL-CCNC: 74 U/L (ref 50–136)
ALT SERPL-CCNC: 38 U/L (ref 12–65)
ANION GAP SERPL CALC-SCNC: 6 MMOL/L (ref 7–16)
AST SERPL-CCNC: 31 U/L (ref 15–37)
BASOPHILS # BLD: 0 K/UL (ref 0–0.2)
BASOPHILS NFR BLD: 1 % (ref 0–2)
BILIRUB SERPL-MCNC: 0.4 MG/DL (ref 0.2–1.1)
BUN SERPL-MCNC: 9 MG/DL (ref 6–23)
CALCIUM SERPL-MCNC: 9.2 MG/DL (ref 8.3–10.4)
CHLORIDE SERPL-SCNC: 102 MMOL/L (ref 98–107)
CO2 SERPL-SCNC: 31 MMOL/L (ref 21–32)
CREAT SERPL-MCNC: 0.88 MG/DL (ref 0.6–1)
DIFFERENTIAL METHOD BLD: ABNORMAL
EOSINOPHIL # BLD: 1.3 K/UL (ref 0–0.8)
EOSINOPHIL NFR BLD: 18 % (ref 0.5–7.8)
ERYTHROCYTE [DISTWIDTH] IN BLOOD BY AUTOMATED COUNT: 13.9 % (ref 11.9–14.6)
GLOBULIN SER CALC-MCNC: 4.2 G/DL (ref 2.3–3.5)
GLUCOSE SERPL-MCNC: 103 MG/DL (ref 65–100)
HCT VFR BLD AUTO: 37 % (ref 35.8–46.3)
HGB BLD-MCNC: 12.7 G/DL (ref 11.7–15.4)
IMM GRANULOCYTES # BLD AUTO: 0 K/UL (ref 0–0.5)
IMM GRANULOCYTES NFR BLD AUTO: 0 % (ref 0–5)
LYMPHOCYTES # BLD: 1.7 K/UL (ref 0.5–4.6)
LYMPHOCYTES NFR BLD: 25 % (ref 13–44)
MCH RBC QN AUTO: 31.6 PG (ref 26.1–32.9)
MCHC RBC AUTO-ENTMCNC: 34.3 G/DL (ref 31.4–35)
MCV RBC AUTO: 92 FL (ref 79.6–97.8)
MONOCYTES # BLD: 0.5 K/UL (ref 0.1–1.3)
MONOCYTES NFR BLD: 6 % (ref 4–12)
NEUTS SEG # BLD: 3.6 K/UL (ref 1.7–8.2)
NEUTS SEG NFR BLD: 50 % (ref 43–78)
NRBC # BLD: 0 K/UL (ref 0–0.2)
PLATELET # BLD AUTO: 291 K/UL (ref 150–450)
PMV BLD AUTO: 9.9 FL (ref 9.4–12.3)
POTASSIUM SERPL-SCNC: 3.3 MMOL/L (ref 3.5–5.1)
PROT SERPL-MCNC: 8 G/DL (ref 6.3–8.2)
RBC # BLD AUTO: 4.02 M/UL (ref 4.05–5.25)
SODIUM SERPL-SCNC: 139 MMOL/L (ref 136–145)
WBC # BLD AUTO: 7.1 K/UL (ref 4.3–11.1)

## 2019-10-14 PROCEDURE — 85025 COMPLETE CBC W/AUTO DIFF WBC: CPT

## 2019-10-14 PROCEDURE — 80053 COMPREHEN METABOLIC PANEL: CPT

## 2019-10-14 PROCEDURE — 36415 COLL VENOUS BLD VENIPUNCTURE: CPT

## 2019-10-15 ENCOUNTER — HOSPITAL ENCOUNTER (OUTPATIENT)
Dept: RADIATION ONCOLOGY | Age: 54
Discharge: HOME | End: 2019-10-15
Payer: COMMERCIAL

## 2019-10-15 VITALS
RESPIRATION RATE: 16 BRPM | TEMPERATURE: 97.6 F | BODY MASS INDEX: 36.07 KG/M2 | DIASTOLIC BLOOD PRESSURE: 74 MMHG | SYSTOLIC BLOOD PRESSURE: 129 MMHG | WEIGHT: 230.3 LBS | HEART RATE: 86 BPM | OXYGEN SATURATION: 99 %

## 2019-10-15 PROCEDURE — 99211 OFF/OP EST MAY X REQ PHY/QHP: CPT

## 2019-10-15 NOTE — CONSULTS
Patient: Edvin Akers MRN: [id number]  SSN: [id number]    YOB: 1965  Age: 48 y.o. Sex: female      Other Providers:    MD Prashanth Simms MD    CHIEF COMPLAINT: Breast cancer    DIAGNOSIS: IDC left breast, low grade, ER/ND positive, HER-2 negative, pT1cN1, stage IIA s/p lumpectomy and ALND    HISTORY OF PRESENT ILLNESS:  Edvin Akers is a 48 y.o. female who I am seeing at the request of Dr. Maritza Roberts. She had noted a small lump in the left breast for about a month. Bilateral screening mammograms on 07/30/19 showed a small focal asymmetry in the left retroareolar breast.  Diagnostic left mammogram on 08/01/19 showed persistent asymmetry at the 12:00 position of the left breast 2 cm FN. Left breast US showed a predominantly solid appearing mass measuring 1.2 x 1.2 x 0.9 cm with irregular margin at the 12:00 position. US-guided left breast biopsy on 08/06/19 revealed INFILTRATING DUCT CARCINOMA WITH FOCAL COLLOID FEATURES,  LOW GRADE (WELL DIFFERENTIATED). Markers were %, ND 13% and HER-2 negative (1+). MRI breast on 08/13/19 showed a 1.8 x 1 x 1 cm enhancing mass at the 11:00 position of the left bresat 3 cm FN. A dysmorphic left axillary LN was noted. US-guided left axillary LN biopsy on 08/19/19 revealed MACRO METASTATIC CARCINOMA CONSISTENT WITH PRIOR CORE BIOPSY OF LEFT BREAST ([id number]) INVOLVING TISSUE CONSISTENT WITH NEEDLE BIOPSY OF LYMPH NODE. DEFINITE EXTRACAPSULAR EXTENSION IS NOT IDENTIFIED IN THIS MATERIAL BUT COULD BE PRESENT IN UNSAMPLED LYMPH NODE. Lumpectomy and axillary dissection under the direction of Dr. Maritza Roberts on 09/18/19 revealed INFILTRATING DUCT CARCINOMA, LOW-GRADE (WELL DIFFERENTIATED) MEASURING APPROXIMATELY 1.7 X 0.8 X 0.7 CM. CHANGES CONSISTENT WITH LYMPHOVASCULAR INVASION. Margins were >1 cm. 22 LYMPH NODES NEGATIVE FOR METASTATIC TUMOR. Staging was pT1c pN1.  Surprisingly, all 22 lymph nodes were negative on ALND despite a prior positive needle biopsy. Her case was presented at [place] [id number] and recommendation was for discussions of systemic therapy and radiotherapy. Mammaprint showed high risk luminal type B. She was recommended to proceed with chemotherapy followed by radiation therapy and then an AI. At this time, Ms. Jael Vieira is doing reasonably well. She is recovering well from surgery. She has limited range of motion in the left shoulder following ALND. She denies systemic complaints. OBSTETRIC HISTORY:    Menarche at the age of 17. G2,P2. Oral Contraceptive Pills:  1-1/2 years  Hormone Replacement Therapy:  19 years  Hysterectomy at age 30    PAST MEDICAL HISTORY:    Past Medical History:   Diagnosis Date    Breast cancer ([facility] [address]     Left    HTN (hypertension)     Refusal of blood transfusions as patient is Buddhist        The patient denies history of collagen vascular diseases, pacemaker insertion, prior radiation or prior chemotherapy. PAST SURGICAL HISTORY:   Past Surgical History:   Procedure Laterality Date    HX BREAST BIOPSY Left 08/06/2019    HX BREAST BIOPSY Left 9/18/2019    LEFT BREAST NEEDLE LOCALIZED BIOPSY  performed by Erendira Chang MD at [address] [place] [address] HX CHOLECYSTECTOMY      HX HYSTERECTOMY  1998       MEDICATIONS:     Current Outpatient Medications:     valsartan-hydroCHLOROthiazide (DIOVAN-HCT) 80-12.5 mg per tablet, Take 1 Tab by mouth daily. , Disp: 30 Tab, Rfl: 2    ALLERGIES:   Allergies   Allergen Reactions    Sulfa (Sulfonamide Antibiotics) Hives and Rash    Adhesive Other (comments)     Itching, rash       SOCIAL HISTORY:   Social History     Socioeconomic History    Marital status:      Spouse name: Not on file    Number of children: Not on file    Years of education: Not on file    Highest education level: Not on file   Occupational History    Not on file   Social Needs    Financial resource strain: Not on file    Food insecurity:     Worry: Not on file Inability: Not on file    Transportation needs:     Medical: Not on file     Non-medical: Not on file   Tobacco Use    Smoking status: Never Smoker    Smokeless tobacco: Never Used   Substance and Sexual Activity    Alcohol use: Never     Frequency: Never    Drug use: Not on file    Sexual activity: Yes     Partners: Male   Lifestyle    Physical activity:     Days per week: Not on file     Minutes per session: Not on file    Stress: Not on file   Relationships    Social connections:     Talks on phone: Not on file     Gets together: Not on file     Attends [employer] service: Not on file     Active member of club or organization: Not on file     Attends meetings of clubs or organizations: Not on file     Relationship status: Not on file    Intimate partner violence:     Fear of current or ex partner: Not on file     Emotionally abused: Not on file     Physically abused: Not on file     Forced sexual activity: Not on file   Other Topics Concern    Not on file   Social History Narrative    Not on file       FAMILY HISTORY:   Family History   Problem Relation Age of Onset    Dementia Mother     Heart Disease Father     Cancer Father         prostate    Stroke Neg Hx        REVIEW OF SYSTEMS: Please see the completed review of systems sheet in the chart that I have reviewed today. PHYSICAL EXAMINATION:   ECOG Performance status 0  VITAL SIGNS:   Visit Vitals  /74   Pulse 86   Temp 97.6 °F (36.4 °C)   Resp 16   Wt 104.5 kg (230 lb 4.8 oz)   SpO2 99%   BMI 36.07 kg/m²        GENERAL: The patient is well-developed, ambulatory, alert and in no acute distress. HEENT: Head is normocephalic, atraumatic. Pupils are equal, round and reactive to light and accommodation. Extraocular movement intact. NECK: Neck is supple with no masses. CARDIOVASCULAR: Heart has regular rate and rhythm. There are no murmurs, rubs or gallops.  Radial pulses are 2+ RESPIRATORY: Lungs are clear to auscultation and percussion. There is normal respiratory effort. GASTROINTESTINAL: The abdomen is soft, non-tender, nondistended with no hepatospelnomagaly. Digital rectal examination: deferred. LYMPHATIC: There is no cervical, supraclavicular or axillary lymphadenopathy bilaterally. There is a large seroma in the left axilla. MUSCULOSKELETAL: Extremities reveal no cyanosis, clubbing or edema.  is 5+/5. BREASTS: Examination of the unaffected breast reveals no dominant nodules or masses. The lumpectomy cavity appears well healed with good aesthetics and symmetry. Well healing surgical incision. Steri-strips in place. NEURO:  Cranial nerves II-XII grossly intact. Muscular strength and sensation are intact throughout all four extremities. PATHOLOGY:      8/19/19 left axillary node biopsy  DIAGNOSIS A: \"LEFT AXILLARY LYMPH NODE, CORE BIOPSY\":   MACRO METASTATIC CARCINOMA CONSISTENT WITH PRIOR CORE BIOPSY OF LEFT BREAST ([id number]) INVOLVING TISSUE CONSISTENT WITH NEEDLE BIOPSY OF LYMPH NODE. DEFINITE EXTRACAPSULAR EXTENSION IS NOT IDENTIFIED IN THIS MATERIAL BUT COULD BE PRESENT IN UNSAMPLED LYMPH NODE. SEE COMMENT   Comment   While metastatic carcinoma in this specimen is consistent with that in prior carcinoma from the left breast, other carcinomas could result in similar morphologic features. If clinically indicated special studies can be performed in an attempt to confirm origin of metastatic tumor   Electronically signed out on 8/20/2019 06:41 by YANNI Richardson M.D.      9/18/19 s/p left partial mastectomy and axillary dissection  DIAGNOSIS   A: \"LEFT PARTIAL MASTECTOMY\":  INFILTRATING DUCT CARCINOMA, LOW-GRADE (WELL DIFFERENTIATED) MEASURING APPROXIMATELY 1.7 X 0.8 X 0.7 CM. CHANGES CONSISTENT WITH LYMPHOVASCULAR INVASION. DEFINITE IN SITU COMPONENT IS NOT IDENTIFIED.  MARGINS OF RESECTION ARE GREATER THAN 1 CM FROM TUMOR (SEE ADDITIONAL MARGINS BELOW) SEE COMMENT   B: \"FINAL MEDIAL MARGIN, LEFT BREAST\": TISSUE CONSISTENT WITH BREAST NEGATIVE FOR MALIGNANT TUMOR. C: \"FINAL LATERAL MARGIN, LEFT BREAST\": TISSUE CONSISTENT WITH BREAST NEGATIVE FOR MALIGNANT TUMOR   D: \"FINAL SUPERIOR MARGIN, LEFT BREAST\": TISSUE CONSISTENT WITH BREAST NEGATIVE FOR MALIGNANT TUMOR   E: \"FINAL INFERIOR MARGIN, LEFT BREAST\": TISSUE CONSISTENT WITH BREAST NEGATIVE FOR MALIGNANT TUMOR   F: \"FINAL DEEP MARGIN, LEFT BREAST\": TISSUE CONSISTENT WITH BREAST NEGATIVE FOR MALIGNANT TUMOR   G: \"FINAL ANTERIOR MARGIN, LEFT BREAST\":  TISSUE CONSISTENT WITH BREAST NEGATIVE FOR MALIGNANT TUMOR   H: \"LEFT AXILLARY DISSECTION\": 22 LYMPH NODES NEGATIVE FOR METASTATIC TUMOR (SEE [id number])   Comment   Infiltrating carcinoma in this specimen is consistent with that in prior core biopsy [id number]. If clinically indicated receptor studies can be repeated in this material (see original receptor report for values on prior biopsy). Electronically signed out on 9/20/2019 10:16 by YANNI Richardson M.D.         8/6/19 s/p US-guided left breast biopsy  DIAGNOSIS   A: \"LEFT BREAST, 12:00 POSITION, 2 CM FROM NIPPLE, CORE BIOPSY\": INFILTRATING DUCT CARCINOMA WITH FOCAL COLLOID FEATURES,   LOW GRADE (WELL DIFFERENTIATED). DEFINITE IN SITU COMPONENT AND LYMPHOVASCULAR INVASION ARE NOT IDENTIFIED. Electronically signed out on 8/7/2019 06:55 by YANNI Richardson M.D.   ER: Positive (100%); ND: Positive (13%); HER-2/CELY: Negative (1+)   Pre-bx US of the left axilla showed a few mildly prominent lymph nodes which contain normal fatty valeria.    Post-bx mammo showing appropriate location of the clip.        LABORATORY:   Lab Results   Component Value Date/Time    Sodium 139 10/14/2019 11:09 AM    Potassium 3.3 (L) 10/14/2019 11:09 AM    Chloride 102 10/14/2019 11:09 AM    CO2 31 10/14/2019 11:09 AM    Anion gap 6 (L) 10/14/2019 11:09 AM    Glucose 103 (H) 10/14/2019 11:09 AM    BUN 9 10/14/2019 11:09 AM    Creatinine 0.88 10/14/2019 11:09 AM    GFR est AA >60 10/14/2019 11:09 AM    GFR est non-AA >60 10/14/2019 11:09 AM    Calcium 9.2 10/14/2019 11:09 AM    Albumin 3.8 10/14/2019 11:09 AM    Protein, total 8.0 10/14/2019 11:09 AM    Globulin 4.2 (H) 10/14/2019 11:09 AM    A-G Ratio 0.9 (L) 10/14/2019 11:09 AM    AST (SGOT) 31 10/14/2019 11:09 AM    ALT (SGPT) 38 10/14/2019 11:09 AM     Lab Results   Component Value Date/Time    WBC 7.1 10/14/2019 11:09 AM    HGB 12.7 10/14/2019 11:09 AM    HCT 37.0 10/14/2019 11:09 AM    PLATELET 158 28/59/9399 11:09 AM       RADIOLOGY:      7/30/19 bilat screening mammo  There are scattered fibroglandular densities.    In the left retroareolar breast there is a small focal asymmetry which is new from the previous images.     There is otherwise no suspicious mass, calcification, or architectural distortion in either breast.       IMPRESSION: Left asymmetry for which further evaluation is recommended with ML  and compression magnification views, with possible ultrasound if indicated.        8/1/19 left dx mammo and US  Previously, an anterior left breast asymmetry was seen. When compared to the ML  view, this resides at the 12:00 position of the left breast located 2 cm from  the nipple. Following compression, the previously described density persists. The margins are not clearly demonstrated which can be a worrisome indicator. No abnormal spiculation is seen.     IMPRESSION:  1. Persistent asymmetry following compression imaging.  Further evaluation is  recommend with focused ultrasound imaging.     8/1/19 Left Breast US   Focused ultrasound imaging at the 12 o'clock position of the left breast in the  area of persistent asymmetry as seen on the recent diagnostic mammogram shows a  predominantly solid appearing mass measuring 1.2 cm x 1.2 cm x 0.9 cm. This  demonstrates an irregular margin, and taller than wide configuration on some  images.  Some cystic components are seen and posterior acoustic enhancement is  seen which is not felt to indicate benignity in this case.     IMPRESSION:   1. 1.2 cm x 1.2 cm x 0.9 cm predominantly solid mass corresponding to the left  breast nodule which also demonstrates some worrisome features by mammography.       8/13/19 Breast MRI  A skin marker has been placed at the 2:00 position of the left breast located 4  cm from the nipple marking the patient's biopsy site. The breasts are composed  of scattered fibroglandular elements. A left axillary lymph node is seen on  axial postcontrast image 329, and sagittal postcontrast reconstructed image 97  located approximately 20 cm from the nipple which does not clearly demonstrate a  maintained fatty hilum and has a somewhat rounded appearance for which an early  metastatic lymph node cannot be excluded. No concerning axillary, or internal  mammary lymph nodes are otherwise seen. Evaluation of the lungs is limited by   MRI imaging. However, no obvious pulmonary masses are seen. No significant  pleural effusions are seen. The liver is obscured by cardiac motion artifact and  only partially visualized. However, no focal signal abnormalities are seen prior  to, or following administration of contrast to suggest a possible hepatic  lesion.     Following the administration of intravenous contrast, normal enhancement is seen  of the heart and vascular structures of the breasts. Mild background physiologic  enhancement is seen of the breasts. A small irregularly marginated enhancing  mass is seen centered at the 11:00 position of the left breast 3 cm from the  nipple measuring 1.8 cm long by 1 cm craniocaudal by 1 cm wide consistent with  the patient's known neoplasm at this level. No additional enhancing mass or  concerning nonmasslike enhancement is seen of either breast. No evidence of skin  thickening, or nipple retraction is seen.  No enhancing osseous lesion is seen.     IMPRESSION:  1.  1.8 cm x 1 cm x 1 cm enhancing mass centered at the 11:00 position of the  left breast 3 cm from the nipple consistent with the patient's known left breast neoplasm.     2. Dysmorphic appearing left axillary lymph node located 20 cm from the nipple  which cannot be confirmed to be benign with an early metastatic lymph node not  excluded given its appearance. No evidence for potential metastatic disease is  otherwise seen in the visualized chest.         IMPRESSION:  Dudley Lam is a 48 y.o. female with IDC left breast, low grade, ER/IA positive, HER-2 negative, pT1cN1, stage IIA s/p lumpectomy and ALND. COUNSELING AND COORDINATION OF CARE: I have had a 60 minute consultation with Ms. Juan Mccullough and her  of which greater than half has been spent counseling her about management options for her Stage IIA left breast cancer. The natural history of breast cancer was reviewed with the patient. Prognostic features including stage, performance status and presence or absence of lymph node involvement were reviewed with the patient. Various treatment options including lumpectomy alone, breast conservation therapy, and mastectomy were compared and contrasted with regard to outcome and quality of life. We discussed the data in support of breast conservation therapy, specifically NSABP B-06, which compared mastectomy to lumpectomy plus or minus radiation. This showed no difference in overall survival but improved local regional control with adjuvant radiation. This has become the standard for patient's wishing to conserve the breast.  Subsequent trials have evaluated the role of boost following an initial course and again showed improved control. We have, therefore, typically recommended 50 Gy to the breast followed by a 10 Gy boost to the lumpectomy cavity. We discussed that she has biopsy proven axillary LN involvement. However, on ALND 22 lymph nodes were removed, but no cancer was found.  Thus it will be important to treat with radiation therapy including thorough coverage of the left axilla. The practicalities, indications, benefits, side-effects and complications of radiation therapy were discussed. Skin changes fatigue, and potential for long-term complications including radiation pneumonitis, and rib fractures were among the complications highlighted. I have recommended a course of radiation therapy to the breast and lymphatics as a standard portion of breast conservation therapy. The patient asked numerous questions, which were answered to the best of my ability. Written informed consent for radiation therapy was obtained. She has been recommended to undergo chemotherapy. However, she has not yet decided whether to take the recommended treatment. I have tentatively scheduled her for CT simulation in 4 weeks. However, if she proceeds with chemotherapy we will delay radiation therapy until after chemotherapy has been completed. I have made a referral for her to be seen at lymphedema clinic given her axillary dissection and need for radiation therapy including coverage of the axilla. In addition, she is left dominant, so it will be important to protect the left arm from lymphedema. Plan:  1. Genetic testing-not indicated  2. Smoking cessation-not indicated  3. Patient will be simulated, with radiotherapy to begin shortly thereafter. A dose of 50 Gy to the whole breast and lymphatics followed by a 10 Gy boost to the tumor bed will be prescribed.            Wally Wooten MD   October 15, 2019

## 2019-10-15 NOTE — PROGRESS NOTES
Pt here today for the initial RT consult for left breast cancer with Dr. Lina Valenzuela. Pt is s/p a left lumpectomy on 9/18/19 and is c/o mild soreness when raising her left arm. The Mammaprint indicated a high risk cancer and Dr. Carlos Doyle in Med Onc. recommended chemotherapy/RT/A. I.  Pt stated she is thinking about the options and has not made any treatment decisions yet. An overview of RT was given as well as the Skin Care handout. The form was faxed to [facility] for Lymphedema clinic as pt needs to be able to lift her left arm over her head if she receives RT.

## 2019-10-28 ENCOUNTER — HOSPITAL ENCOUNTER (OUTPATIENT)
Dept: PHYSICAL THERAPY | Age: 54
Discharge: HOME | End: 2019-10-28
Payer: COMMERCIAL

## 2019-10-28 PROCEDURE — 97161 PT EVAL LOW COMPLEX 20 MIN: CPT

## 2019-10-28 PROCEDURE — 97110 THERAPEUTIC EXERCISES: CPT

## 2019-10-28 NOTE — PROGRESS NOTES
Florence oSrto Hobson  : 1965  Primary: [facility]  Secondary:  [facility] [address] [place] [address], [address], [address]  [phone]   [phone]        OUTPATIENT PHYSICAL THERAPY: Daily Treatment Note 10/28/2019  Visit Count:  1       ICD-10: Treatment Diagnosis: stiffness of left shoulder not elsewhere classified M 25.612  Other lymphedema not elsewhere classified I 89.0  Precautions/Allergies:   Sulfa (sulfonamide antibiotics) and Adhesive    TREATMENT PLAN:  Effective Dates: 10/28/2019 TO 2020 (90 days). Frequency/Duration: 2 times a week for 90 Day(s)       Pre-treatment Symptoms/Complaints:  Pain in the breast and axilla. The patient reports she will not be having chemo or radiation. Pain: Initial:   510 Post Session:  5/10   Medications Last Reviewed:  10/28/2019  Updated Objective Findings:  See evaluation note from today  TREATMENT:     THERAPEUTIC EXERCISE: (30 minutes):  Exercises per grid below to improve mobility. Required minimal verbal cues to promote proper body alignment. Progressed range as indicated. Advised to limit ice and heat to left upper extremity, wear supportive bra  STinser PortalAccess Code: [email or web address]   URL: [email or web address]   Date: 10/28/2019   Prepared by: Emi Ndiaye     Exercises   Chicken Wing - 10 reps - 1 sets - 5 hold - 3x daily - 7x weekly   Supine Shoulder Flexion with Dowel - 10 reps - 1 sets - 5 hold - 3x daily - 7x weekly   Supine Shoulder External Rotation with Dowel - 10 reps - 1 sets - 5 hold - 3x daily - 7x weekly   Supine Lower Trunk Rotation - 10 reps - 1 sets - 5 hold - 3x daily - 5x weekly   Scapular retraction x 10 reps with 5 count hold  Cording stretch/ moose x 10 reps with 5 count hold      Treatment/Session Summary:    · Response to Treatment:  tolerated the treatment fair, grimacing with pain, will need to be diligent.   · Communication/Consultation: wear a supportive bra, perform HEP, no pampering the arm, use it normally  · Equipment provided today:  None today  · Recommendations/Intent for next treatment session: Next visit will focus on ROM, lymphedema education, assist with compression garment.     Total Treatment Billable Duration:  30 minutes  PT Patient Time In/Time Out  Time In: 1220  Time Out: 8952  Catie Mc, ORALIA    Future Appointments   Date Time Provider Hank Yanelis   10/29/2019  1:45 PM Milena Higuera MD [facility] PRE PRE   10/31/2019  8:45 AM Jcarlos Chahal, PT [facility]   11/4/2019  8:45 AM Jcarlos Chahal PT [facility]   11/7/2019  8:45 AM Mary Fitch, PT [facility]   11/12/2019  9:00 AM GCC [facility]

## 2019-10-28 NOTE — THERAPY EVALUATION
Courtney Wright  : 1965  Primary: [facility]  [facility]  [facility] [address], [address]  [phone]   [phone]          OUTPATIENT PHYSICAL THERAPY:Initial Assessment 10/28/2019   ICD-10: Treatment Diagnosis: stiffness of left shoulder not elsewhere classified M 25.612  Other lymphedema not elsewhere classified I 89.0  Precautions/Allergies:   Sulfa (sulfonamide antibiotics) and Adhesive    TREATMENT PLAN:  Effective Dates: 10/28/2019 TO 2020 (90 days). Frequency/Duration: 2 times a week for 90 Day(s) MEDICAL/REFERRING DIAGNOSIS:  [facility] [C50.912]  History of lumpectomy of left breast [Z98.890]    DATE OF ONSET:   REFERRING PHYSICIAN: Reema Rothman MD MD Orders: oncology rehab  Return MD Appointment: 19     INITIAL ASSESSMENT:  Ms. Jamar Flor presents presents following diagnosis of left breast cancer. She underwent a biopsy 19 followed by a lymph node biopsy 19. She underwent a lumpectomy and AND ( 22 nodes) 19. She has decided not to have chemo and radiation. She is very limited in left shoulder ROM. She previously had a frozen shoulder on the left. She has cording and pain which limit mobility. She is at significant risk for lymphedema. She will benefit from lymphedema eduction, ROM, strenghening and compression. PROBLEM LIST (Impacting functional limitations):  1. Decreased Strength  2. Increased Pain  3. Decreased Flexibility/Joint Mobility  4. Edema/Girth  5. Decreased Knowledge of Precautions  6. Decreased [place] with Home Exercise Program INTERVENTIONS PLANNED: (Treatment may consist of any combination of the following)  1. Decongestion Therapy  2. Home Exercise Program (HEP)  3. Manual Therapy  4. Range of Motion (ROM)  5.  Therapeutic Exercise/Strengthening     GOALS: (Goals have been discussed and agreed upon with patient.)  Short-Term Functional Goals: Time Frame: 4 weeks  1. The patient will be independent with HEP for ROM within 4 weeks. 2. The patient will gain 20 degrees flexion, abduction and external rotation within 4 weeks. 3. The patient will report a pain level of 3 or less within 4 weeks. 4. The patient will be fit with a compression sleeve and gauntlet within 4 weeks. 5. The patient will have knowledge of signs an symptoms of lymphedema and how to manage within 4 weeks. Discharge Goals: Time Frame: 8 weeks  1. The patient will improve her DASH by 10 within 8 weeks. 2. The patient will gain 1/2 grade strength of left upper extremity within 8 weeks. OUTCOME MEASURE:   Tool Used: Disabilities of the Arm, Shoulder and Hand (DASH) Questionnaire - Quick Version  Score:  Initial: 31/55  Most Recent: X/55 (Date: -- )   Interpretation of Score: The DASH is designed to measure the activities of daily living in person's with upper extremity dysfunction or pain. Each section is scored on a 1-5 scale, 5 representing the greatest disability. The scores of each section are added together for a total score of 55. Tool Used: ECOG Performance Survey Score  Score:  Initial: 1 Most Recent:      Interpretation of Score:   0 Fully active, able to carry on all pre-disease performance without restriction   1 Restricted in physically strenuous activity but ambulatory and able to carry out work of a light or sedentary nature, e.g., light house work, office work   2 Ambulatory and capable of all selfcare but unable to carry out any work activities. Up and about more than 50% of waking hours   3 Capable of only limited selfcare, confined to bed or chair more than 50% of waking hours   4 Completely disabled. Cannot carry on any selfcare. Totally confined to bed or chair   5 Dead        Tool Used: Lymphedema Life Impact Scale   Score:  Initial: 18 Most Recent: X (Date: -- )   Interpretation of Score:  The Lymphedema Life Impact Scale (LLIS) is a validated instrument that measures the physical, functional, and psychosocial concerns pertinent to patients with extremity lymphedema. The Scale's questionnaire is administered to patients to gauge impairments, activity limitations, and participation restrictions resulting from their lymphedema. MEDICAL NECESSITY:   · Patient is expected to demonstrate progress in strength, range of motion and edema management to increase independence with household activity. REASON FOR SERVICES/OTHER COMMENTS:  · Patient continues to demonstrate capacity to improve strength, ROM and edema management which will increase independence. Total Duration:  PT Patient Time In/Time Out  Time In: 1305  Time Out: 1359    Rehabilitation Potential For Stated Goals: Good  Regarding Michell MARILYN Wright's therapy, I certify that the treatment plan above will be carried out by a therapist or under their direction. Thank you for this referral,  Mary Gonzalez, PT          PAIN/SUBJECTIVE:   Initial:   5 Post Session:  5/10   HISTORY:   History of Injury/Illness (Reason for Referral):  Lumpectomy with AND  Past Medical History/Comorbidities:   Ms. Suleman Lopez  has a past medical history of Breast cancer ([facility] HTN (hypertension), and Refusal of blood transfusions as patient is Yarsani. She also has no past medical history of Adverse effect of anesthesia, Difficult intubation, Malignant hyperthermia due to anesthesia, Nausea & vomiting, or Pseudocholinesterase deficiency. Ms. Suleman Lopez  has a past surgical history that includes hx breast biopsy (Left, 08/06/2019); hx cholecystectomy; hx hysterectomy (1998); and hx breast biopsy (Left, 9/18/2019).    Past Medical History:   Diagnosis Date    Breast cancer ([facility]     Left    HTN (hypertension)     Refusal of blood transfusions as patient is Yarsani      Past Surgical History:   Procedure Laterality Date    HX BREAST BIOPSY Left 08/06/2019    HX BREAST BIOPSY Left 9/18/2019    LEFT BREAST NEEDLE LOCALIZED BIOPSY  performed by Sina Nieto MD at [address] HX CHOLECYSTECTOMY      HX HYSTERECTOMY  1998       Social History/Living Environment:     lives with spouse  Prior Level of Function/Work/Activity:  Works from home, computer work  Dominant Side:         LEFT   Ambulatory/Rehab Services H2 Model Falls Risk Assessment   Risk Factors:       No Risk Factors Identified Ability to Rise from Chair:       (0)  Ability to rise in a single movement   Falls Prevention Plan:       No modifications necessary   Total: (5 or greater = High Risk): 0   ©2010 [facility] [place] [address] [place], [place] [address]on [place] Patent #[id number]. Federal Law prohibits the replication, distribution or use without written permission from [facility] [employer] [facility]   Current Medications:       Current Outpatient Medications:     valsartan-hydroCHLOROthiazide (DIOVAN-HCT) 80-12.5 mg per tablet, Take 1 Tab by mouth daily. , Disp: 30 Tab, Rfl: 2   Date Last Reviewed:  10/28/19   Number of Personal Factors/Comorbidities that affect the Plan of Care: 1-2: MODERATE COMPLEXITY   EXAMINATION:   Palpation:          Tissue tight in axilla, cording noted, tissue firm around incision  ROM:          Left flexion 105, abduction 75, external rotation 65  Strength:          Grossly 4/5 left upper extremity  Functional Mobility:         Gait/Ambulation:  independent        Transfers:  independent        Bed Mobility:  independent  Skin Integrity:          Intact, mild pink tissue along the inner breast  Edema/Girth:  none    Left Right    Initial Most Recent Initial Most Recent   Upper  Extremity Base 3rd Finger (cm): 19.8  Wrist (cm): 17.1  Mid Forearm (cm): 21.6  Elbow (cm): 27.3  Axilla (cm): 33.3(39.1, 38.5)   Base 3rd Finger (cm): 19.2  Wrist (cm): 16.8  Mid Forearm (cm): 21.7  Elbow (cm): 27.3  Axilla (cm): 32.2(37, 35.9)     Lower  Extremity               Body Structures Involved:  1. Joints  2. Muscles Body Functions Affected:  1. Sensory/Pain  2. Neuromusculoskeletal  3. lymphatic system Activities and Participation Affected:  1.  None   Number of elements (examined above) that affect the Plan of Care: 4+: HIGH COMPLEXITY   CLINICAL PRESENTATION:   Presentation: Stable and uncomplicated: LOW COMPLEXITY   CLINICAL DECISION MAKING:   Use of outcome tool(s) and clinical judgement create a POC that gives a: Clear prediction of patient's progress: LOW COMPLEXITY   [place]

## 2019-10-31 ENCOUNTER — HOSPITAL ENCOUNTER (OUTPATIENT)
Dept: PHYSICAL THERAPY | Age: 54
Discharge: HOME | End: 2019-10-31
Payer: COMMERCIAL

## 2019-10-31 PROCEDURE — 97110 THERAPEUTIC EXERCISES: CPT

## 2019-10-31 NOTE — PROGRESS NOTES
Papi Fitting [place]  : 1965  Primary: [facility]  Secondary:  [facility] at [facility]  [address] , [address], [address]  [phone]   [phone]        OUTPATIENT PHYSICAL THERAPY: Daily Treatment Note 10/31/2019  Visit Count:  2       ICD-10: Treatment Diagnosis: stiffness of left shoulder not elsewhere classified M 25.612  Other lymphedema not elsewhere classified I 89.0  Precautions/Allergies:   Sulfa (sulfonamide antibiotics) and Adhesive    TREATMENT PLAN:  Effective Dates: 10/28/2019 TO 2020 (90 days). Frequency/Duration: 2 times a week for 90 Day(s)       Pre-treatment Symptoms/Complaints:  The patient reports she isn't having pain and soreness like she was,  Pain: Initial:   1/10 Post Session:  1/10   Medications Last Reviewed:  10/31/2019  Updated Objective Findings:  as below  TREATMENT:     THERAPEUTIC EXERCISE: (36 minutes):  Exercises per grid below to improve mobility. Required minimal verbal cues to promote proper body alignment. Progressed range as indicated. Exercises below, added corner stretch and lateral wall stretch. Changed hand position in cording stretch. Scar tissue massage performed along the left breast and axilla. Assisted in acquiring a prescription for a garment. This should help with cording. HomeAway PortalAccess Code: [email or web address]   URL: [email or web address]   Date: 10/28/2019   Prepared by: Alfredo Flores     Exercises   Chicken Wing - 10 reps - 1 sets - 5 hold - 3x daily - 7x weekly   Supine Shoulder Flexion with Dowel - 10 reps - 1 sets - 5 hold - 3x daily - 7x weekly   Supine Shoulder External Rotation with Dowel - 10 reps - 1 sets - 5 hold - 3x daily - 7x weekly   Supine Lower Trunk Rotation - 10 reps - 1 sets - 5 hold - 3x daily - 5x weekly   Scapular retraction x 10 reps with 5 count hold  Cording stretch/ moose x 10 reps with 5 count hold  Access Code: [email or web address]   URL: [email or web address]   Date: 10/31/2019   Prepared by: Austin Lainez     Exercises   Corner Pec Major Stretch - 10 reps - 1 sets - 5 hold - 1x daily - 7x weekly       Treatment/Session Summary:    · Response to Treatment:  tolerated the treatment fair, grimacing with pain, will need to be diligent. · Communication/Consultation:  wear a supportive bra, perform HEP, no pampering the arm, use it normally  · Equipment provided today:  None today  · Recommendations/Intent for next treatment session: Next visit will focus on ROM, lymphedema education, assist with compression garment.     Total Treatment Billable Duration:  36 minutes  PT Patient Time In/Time Out  Time In: 0848  Time Out: 1553  Eddie Manrique PT    Future Appointments   Date Time Provider Hank Hilario   11/4/2019  8:45 AM Colin Read D, PT [facility]   11/7/2019  8:45 AM Mary Gonzalez, PT [facility]   11/12/2019  9:00 AM GCC CT SIMULATION GCCROG G[facility]

## 2019-11-04 ENCOUNTER — HOSPITAL ENCOUNTER (OUTPATIENT)
Dept: PHYSICAL THERAPY | Age: 54
Discharge: HOME | End: 2019-11-04
Payer: COMMERCIAL

## 2019-11-04 PROCEDURE — 97110 THERAPEUTIC EXERCISES: CPT

## 2019-11-04 NOTE — PROGRESS NOTES
Delia Fraga Kyle  : 1965  Primary: [facility]  Secondary:  [facility]  [facility] [address], [address]  [phone]   [phone]        OUTPATIENT PHYSICAL THERAPY: Daily Treatment Note 2019  Visit Count:  3       ICD-10: Treatment Diagnosis: stiffness of left shoulder not elsewhere classified M 25.612  Other lymphedema not elsewhere classified I 89.0  Precautions/Allergies:   Sulfa (sulfonamide antibiotics) and Adhesive    TREATMENT PLAN:  Effective Dates: 10/28/2019 TO 2020 (90 days). Frequency/Duration: 2 times a week for 90 Day(s)       Pre-treatment Symptoms/Complaints:  The patient reports she tried to get her sleeve, but the place was closed on Friday. Pain: Initial:   1/10 Post Session:  1/10   Medications Last Reviewed:  2019  Updated Objective Findings:  as below  TREATMENT:     THERAPEUTIC EXERCISE: (40 minutes):  Exercises per grid below to improve mobility. Required minimal verbal cues to promote proper body alignment. Progressed range as indicated. Exercises below. Added upper trunk rotation. Scar massage to left breast and axillary region. Seedcamp PortalAccess Code: [email or web address]   URL: [email or web address]   Date: 10/28/2019   Prepared by: Angela Dessert     Exercises   Chicken Wing - 10 reps - 1 sets - 5 hold - 3x daily - 7x weekly   Supine Shoulder Flexion with Dowel - 10 reps - 1 sets - 5 hold - 3x daily - 7x weekly   Supine Shoulder External Rotation with Dowel - 10 reps - 1 sets - 5 hold - 3x daily - 7x weekly   Supine Lower Trunk Rotation - 10 reps - 1 sets - 5 hold - 3x daily - 5x weekly   Scapular retraction x 10 reps with 5 count hold  Cording stretch/ moose x 10 reps with 5 count hold  Access Code: [email or web address]   URL: [email or web address]   Date: 10/31/2019   Prepared by: Angela Dessert     Exercises   Corner Pec Major Stretch - 10 reps - 1 sets - 5 hold - 1x daily - 7x weekly   Access Code: [email or web address]   URL: [email or web address]   Date: 11/04/2019   Prepared by: Catherine Shape     Exercises   Sidelying Thoracic Lumbar Rotation - 10 reps - 1 sets - 5 hold - 1x daily - 7x weekly       Treatment/Session Summary:    · Response to Treatment:  tolerated the treatment well. Minimal grimacing. Tissue more pliable. .  · Communication/Consultation:  wear a supportive bra, perform HEP, no pampering the arm, use it normally  · Equipment provided today:  None today  · Recommendations/Intent for next treatment session: Next visit will focus on ROM, lymphedema education, assist with compression garment. Add weights once garments are acquired.     Total Treatment Billable Duration:  40 minutes  PT Patient Time In/Time Out  Time In: 5617  Time Out: 5161  Kristie Boyd PT    Future Appointments   Date Time Provider Hank Hilario   11/7/2019  8:45 AM Linette GAO, PT [facility]   11/11/2019  8:45 AM Mary Gonzalez, PT [facility]   11/12/2019  9:00 AM [facility] CT SIMULATION [facility]OG [facility]   11/14/2019  8:00 AM James Gonzalez, PT [facility]

## 2019-11-07 ENCOUNTER — HOSPITAL ENCOUNTER (OUTPATIENT)
Dept: PHYSICAL THERAPY | Age: 54
Discharge: HOME | End: 2019-11-07
Payer: COMMERCIAL

## 2019-11-07 PROCEDURE — 97140 MANUAL THERAPY 1/> REGIONS: CPT

## 2019-11-07 PROCEDURE — 97110 THERAPEUTIC EXERCISES: CPT

## 2019-11-07 NOTE — PROGRESS NOTES
Reta Severin Pilgrim  : 1965  Primary: [facility]  Secondary:  [facility]rapy Center at [facility]  [address] , [address], [address]  [phone]   [phone]        OUTPATIENT PHYSICAL THERAPY: Daily Treatment Note 2019  Visit Count:  4       ICD-10: Treatment Diagnosis: stiffness of left shoulder not elsewhere classified M 25.612  Other lymphedema not elsewhere classified I 89.0  Precautions/Allergies:   Sulfa (sulfonamide antibiotics) and Adhesive    TREATMENT PLAN:  Effective Dates: 10/28/2019 TO 2020 (90 days). Frequency/Duration: 2 times a week for 90 Day(s)       Pre-treatment Symptoms/Complaints:  The patient reports she will be fit with a compression garment this morning. Pain: Initial:   1/10 Post Session:  1/10   Medications Last Reviewed:  2019  Updated Objective Findings:  as below   Left flexion 155, abduction 115, external rotation 70  TREATMENT:     THERAPEUTIC EXERCISE: (20 minutes):  Exercises per grid below to improve mobility. Required minimal verbal cues to promote proper body alignment. Progressed range as indicated. Manual therapy:  13 min     Exercises below. Added upper trunk rotation. Scar massage to left breast and axillary region. Scar massage also in sidelying. Advised the patient to continue with scar massage. Assessed ROM. Nice increase in range. Truminim PortalAccess Code: [email or web address]   URL: [email or web address]   Date: 10/28/2019   Prepared by: Ethridge Boas     Exercises   Chicken Wing - 10 reps - 1 sets - 5 hold - 3x daily - 7x weekly   Supine Shoulder Flexion with Dowel - 10 reps - 1 sets - 5 hold - 3x daily - 7x weekly   Supine Shoulder External Rotation with Dowel - 10 reps - 1 sets - 5 hold - 3x daily - 7x weekly   Supine Lower Trunk Rotation - 10 reps - 1 sets - 5 hold - 3x daily - 5x weekly   Scapular retraction x 10 reps with 5 count hold  Cording stretch/ moose x 10 reps with 5 count hold  Access Code: [email or web address]   URL: [email or web address]   Date: 10/31/2019   Prepared by: Alfredo Flores     Exercises   Corner Pec Major Stretch - 10 reps - 1 sets - 5 hold - 1x daily - 7x weekly   Access Code: [email or web address]   URL: [email or web address]   Date: 11/04/2019   Prepared by: Alfredo Flores     Exercises   Sidelying Thoracic Lumbar Rotation - 10 reps - 1 sets - 5 hold - 1x daily - 7x weekly       Treatment/Session Summary:    · Response to Treatment:  tolerated the treatment well. Minimal grimacing. Tissue more pliable. .  · Communication/Consultation:  wear a supportive bra, perform HEP, no pampering the arm, use it normally  Continue with scar massage  · Equipment provided today:  None today  · Recommendations/Intent for next treatment session: Next visit will focus on ROM, lymphedema education, assist with compression garment. Add weights once garments are acquired.     Total Treatment Billable Duration:  33 minutes  PT Patient Time In/Time Out  Time In: 2274  Time Out: 0929  Elvi Hurt, PT    Future Appointments   Date Time Provider Hank Hilario   11/11/2019  8:45 AM Miya Villareal, [place] [facility]   11/14/2019  8:00 AM Jyoti Gonzalez, PT [facility]

## 2019-11-11 ENCOUNTER — HOSPITAL ENCOUNTER (OUTPATIENT)
Dept: PHYSICAL THERAPY | Age: 54
Discharge: HOME | End: 2019-11-11
Payer: COMMERCIAL

## 2019-11-11 PROCEDURE — 97140 MANUAL THERAPY 1/> REGIONS: CPT

## 2019-11-11 PROCEDURE — 97110 THERAPEUTIC EXERCISES: CPT

## 2019-11-11 NOTE — PROGRESS NOTES
Jason Wright  : 1965  Primary: John Jiménez Of [place]*  Secondary:  Therapy Center at [facility]  [address], [address], [place]  [phone]   [phone]        OUTPATIENT PHYSICAL THERAPY: Daily Treatment Note 2019  Visit Count:  5       ICD-10: Treatment Diagnosis: stiffness of left shoulder not elsewhere classified M 25.612  Other lymphedema not elsewhere classified I 89.0  Precautions/Allergies:   Sulfa (sulfonamide antibiotics) and Adhesive    TREATMENT PLAN:  Effective Dates: 10/28/2019 TO 2020 (90 days). Frequency/Duration: 2 times a week for 90 Day(s)       Pre-treatment Symptoms/Complaints:  The patient reports she has her new garment. Pain: Initial:   1/10 Post Session:  1/10   Medications Last Reviewed:  2019  Updated Objective Findings:  as below   Left flexion 155, abduction 115, external rotation 70  TREATMENT:     THERAPEUTIC EXERCISE: (20 minutes):  Exercises per grid below to improve mobility. Required minimal verbal cues to promote proper body alignment. Progressed range as indicated. Manual therapy:  16 min     Exercises below. Added bicep curls, shoulder flexion, shoulder abduction, triceps extension, bent over rows and wall push ups x 10 reps with 2#.  Given written instructions. Instructed in donning, doffing and wear time of garments. [employer] PortalAccess Code: [email or web address]   URL: [email or web address]   Date: 10/28/2019   Prepared by: Adrienne Other     Exercises   Chicken Wing - 10 reps - 1 sets - 5 hold - 3x daily - 7x weekly   Supine Shoulder Flexion with Dowel - 10 reps - 1 sets - 5 hold - 3x daily - 7x weekly   Supine Shoulder External Rotation with Dowel - 10 reps - 1 sets - 5 hold - 3x daily - 7x weekly   Supine Lower Trunk Rotation - 10 reps - 1 sets - 5 hold - 3x daily - 5x weekly   Scapular retraction x 10 reps with 5 count hold  Cording stretch/ moose x 10 reps with 5 count hold  Access Code: [email or web address]   URL: [email or web address]   Date: 10/31/2019   Prepared by: Natasha Bi     Exercises   Corner Pec Major Stretch - 10 reps - 1 sets - 5 hold - 1x daily - 7x weekly   Access Code: [email or web address]   URL: [email or web address]   Date: 11/04/2019   Prepared by: Natasha Bi     Exercises   Sidelying Thoracic Lumbar Rotation - 10 reps - 1 sets - 5 hold - 1x daily - 7x weekly   Access Code: [email or web address] [facility]   URL: [email or web address]   Date: 11/11/2019   Prepared by: Natasha Bi     Exercises   Standing Shoulder Flexion with Dumbbells - 10 reps - 1 sets - 1x daily - 3x weekly   Shoulder Abduction with Dumbbells - Thumbs Up - 10 reps - 1 sets - 1x daily - 3x weekly   Standing Eccentric Bicep Curl Pronated then Supinated - 10 reps - 1 sets - 1x daily - 3x weekly   Standing Bent Over Single Arm Scapular Row with Table Support - 10 reps - 1 sets - 1x daily - 3x weekly   Standing Bent Over on Chair Single Arm Tricep Extension with Dumbbell - 10 reps - 1 sets - 1x daily - 3x weekly   Wall Push Up - 10 reps - 1 sets - 1x daily - 3x weekly       Treatment/Session Summary:    · Response to Treatment:  tolerated the treatment well. Minimal grimacing. Tissue more pliable. .  · Communication/Consultation:  wear a supportive bra, perform HEP, no pampering the arm, use it normally  Continue with scar massage  · Equipment provided today:  None today  · Recommendations/Intent for next treatment session: Next visit will focus on assessing girth after performing a week of weights, scar massage, ROM and strengthening. She will return in 1 weeks.      Total Treatment Billable Duration:  36 minutes  PT Patient Time In/Time Out  Time In: 0803  Time Out: 0554  Jt Barrera PT    Future Appointments   Date Time Provider Hank Hilario   11/11/2019  8:45 AM [facility]   11/14/2019  8:00 AM Mary Gonzalez, PT [facility]   11/18/2019  8:00 AM Sohail Magana, PT [facility]

## 2019-11-12 ENCOUNTER — APPOINTMENT (OUTPATIENT)
Dept: RADIATION ONCOLOGY | Age: 54
End: 2019-11-12

## 2019-11-18 ENCOUNTER — HOSPITAL ENCOUNTER (OUTPATIENT)
Dept: PHYSICAL THERAPY | Age: 54
Discharge: HOME | End: 2019-11-18
Payer: COMMERCIAL

## 2019-11-18 PROCEDURE — 97110 THERAPEUTIC EXERCISES: CPT

## 2019-11-18 NOTE — PROGRESS NOTES
Mariza Wright  : 1965  Primary: María Mora Of [place]*  Secondary:  Therapy Center at [facility]  [address], [place] [address]  [phone]   [phone]        OUTPATIENT PHYSICAL THERAPY: Daily Treatment Note 2019  Visit Count:  6       ICD-10: Treatment Diagnosis: stiffness of left shoulder not elsewhere classified M 25.612  Other lymphedema not elsewhere classified I 89.0  Precautions/Allergies:   Sulfa (sulfonamide antibiotics) and Adhesive    TREATMENT PLAN:  Effective Dates: 10/28/2019 TO 2020 (90 days). Frequency/Duration: 2 times a week for 90 Day(s)       Pre-treatment Symptoms/Complaints:  The patient reports she is doing well with her exercises. Pain: Initial:   0/10 Post Session:  0/10   Medications Last Reviewed:  2019  Updated Objective Findings:  as below   Left flexion 170, abduction 135, external rotation 90  Edema/Girth:  none    Left Right    Initial Most Recent Initial Most Recent   Upper  Extremity Base 3rd Finger (cm): 19.5  Wrist (cm): 16.9  Mid Forearm (cm): 21.5  Elbow (cm): 27  Axilla (cm): 31.8(37.5, 37)         Lower  Extremity               TREATMENT:     THERAPEUTIC EXERCISE: (26 minutes):  Exercises per grid below to improve mobility. Required minimal verbal cues to promote proper body alignment. Progressed range as indicated. Manual therapy:       Exercises below. Assessed ROM and girth. Both have improved. [employer] PortalAccess Code: [email or web address]   URL: [email or web address]   Date: 10/28/2019   Prepared by: Annita Brown     Exercises   Chicken Wing - 10 reps - 1 sets - 5 hold - 3x daily - 7x weekly   Supine Shoulder Flexion with Dowel - 10 reps - 1 sets - 5 hold - 3x daily - 7x weekly   Supine Shoulder External Rotation with Dowel - 10 reps - 1 sets - 5 hold - 3x daily - 7x weekly   Supine Lower Trunk Rotation - 10 reps - 1 sets - 5 hold - 3x daily - 5x weekly   Scapular retraction x 10 reps with 5 count hold  Cording stretch/ moose x 10 reps with 5 count hold  Access Code: [email or web address]   URL: [email or web address]   Date: 10/31/2019   Prepared by: Catherine Shape     Exercises   Corner Pec Major Stretch - 10 reps - 1 sets - 5 hold - 1x daily - 7x weekly   Access Code: [email or web address]   URL: [email or web address]   Date: 11/04/2019   Prepared by: Catherine Shape     Exercises   Sidelying Thoracic Lumbar Rotation - 10 reps - 1 sets - 5 hold - 1x daily - 7x weekly   Access Code: [address]   URL: [email or web address]   Date: 11/11/2019   Prepared by: Catherine Shape     Exercises   Standing Shoulder Flexion with Dumbbells - 10 reps - 1 sets - 1x daily - 3x weekly   Shoulder Abduction with Dumbbells - Thumbs Up - 10 reps - 1 sets - 1x daily - 3x weekly   Standing Eccentric Bicep Curl Pronated then Supinated - 10 reps - 1 sets - 1x daily - 3x weekly   Standing Bent Over Single Arm Scapular Row with Table Support - 10 reps - 1 sets - 1x daily - 3x weekly   Standing Bent Over on Chair Single Arm Tricep Extension with Dumbbell - 10 reps - 1 sets - 1x daily - 3x weekly   Wall Push Up - 10 reps - 1 sets - 1x daily - 3x weekly       Treatment/Session Summary:    · Response to Treatment:  tolerated the treatment well. Girth and ROM improved. · Communication/Consultation:  wear a supportive bra, perform HEP, no pampering the arm, use it normally  Continue with scar massage  · Equipment provided today:  None today  · Recommendations/Intent for next treatment session: Next visit will focus on assessing girth after performing 2 weeks of weights, scar massage, ROM and strengthening. Increase to 3#. She will return in 2 weeks.      Total Treatment Billable Duration:  26 minutes  PT Patient Time In/Time Out  Time In: 0803  Time Out: 0633  Kristie Boyd PT    Future Appointments   Date Time Provider Hank Hilario   12/2/2019  8:00 AM James Gonzalez, PT [facility]

## 2019-12-03 ENCOUNTER — HOSPITAL ENCOUNTER (OUTPATIENT)
Dept: PHYSICAL THERAPY | Age: 54
Discharge: HOME | End: 2019-12-03
Payer: COMMERCIAL

## 2019-12-03 PROCEDURE — 97110 THERAPEUTIC EXERCISES: CPT

## 2019-12-03 NOTE — PROGRESS NOTES
Jose Luis Wright  : 1965  Primary: Lisseth Dickerson Of [place]*  Secondary:  Therapy Center at [facility]  [address], [place] [address]  [phone]   [phone]        OUTPATIENT PHYSICAL THERAPY: Daily Treatment Note 12/3/2019  Visit Count:  7       ICD-10: Treatment Diagnosis: stiffness of left shoulder not elsewhere classified M 25.612  Other lymphedema not elsewhere classified I 89.0  Precautions/Allergies:   Sulfa (sulfonamide antibiotics) and Adhesive    TREATMENT PLAN:  Effective Dates: 10/28/2019 TO 2020 (90 days). Frequency/Duration: 2 times a week for 90 Day(s)       Pre-treatment Symptoms/Complaints:  The patient reports she is exercising 4-5 days a week. She reports her breast feels heavy and full. Pain: Initial:   0/10 Post Session:  0/10   Medications Last Reviewed:  12/3/2019  Updated Objective Findings:  as below   Left flexion 175, abduction 170, external rotation 90  Edema/Girth:  none    Left Right    Initial Most Recent Initial Most Recent   Upper  Extremity Base 3rd Finger (cm): 19.5  Wrist (cm): 17  Mid Forearm (cm): 21.6  Elbow (cm): 26.9  Axilla (cm): 33.2(27.7, 38.2)         Lower  Extremity               TREATMENT:     THERAPEUTIC EXERCISE: (41 minutes):  Exercises per grid below to improve mobility. Required minimal verbal cues to promote proper body alignment. Progressed range as indicated. Manual therapy:  Left anterior chest and axilla. Exercises below. Assessed ROM and girth. Girth slightly mata in the left upper arm. Advised to wear the sleeve higher on the arm. Advised to wear it more regularly. Advised to wear a snug fitting sports bra. Given options of styles. Reduce exercises to 3 times per week. Stop if the arm feels fatigue or heaviness. Quarterly PortalAccess Code: [email or web address]   URL: [email or web address]   Date: 10/28/2019   Prepared by: Vivian Linares   Chicken Wing - 10 reps - 1 sets - 5 hold - 3x daily - 7x weekly   Supine Shoulder Flexion with Dowel - 10 reps - 1 sets - 5 hold - 3x daily - 7x weekly   Supine Shoulder External Rotation with Dowel - 10 reps - 1 sets - 5 hold - 3x daily - 7x weekly   Supine Lower Trunk Rotation - 10 reps - 1 sets - 5 hold - 3x daily - 5x weekly   Scapular retraction x 10 reps with 5 count hold  Cording stretch/ moose x 10 reps with 5 count hold  Access Code: [email or web address]   URL: [email or web address]   Date: 10/31/2019   Prepared by: Jt Mouse     Exercises   Corner Pec Major Stretch - 10 reps - 1 sets - 5 hold - 1x daily - 7x weekly   Access Code: [email or web address]   URL: [email or web address]   Date: 11/04/2019   Prepared by: Jt Mouse     Exercises   Sidelying Thoracic Lumbar Rotation - 10 reps - 1 sets - 5 hold - 1x daily - 7x weekly   Access Code: [address] [facility]   URL: [email or web address]   Date: 11/11/2019   Prepared by: Jt Mouse     Exercises   Standing Shoulder Flexion with Dumbbells - 10 reps - 1 sets - 1x daily - 3x weekly   Shoulder Abduction with Dumbbells - Thumbs Up - 10 reps - 1 sets - 1x daily - 3x weekly   Standing Eccentric Bicep Curl Pronated then Supinated - 10 reps - 1 sets - 1x daily - 3x weekly   Standing Bent Over Single Arm Scapular Row with Table Support - 10 reps - 1 sets - 1x daily - 3x weekly   Standing Bent Over on Chair Single Arm Tricep Extension with Dumbbell - 10 reps - 1 sets - 1x daily - 3x weekly   Wall Push Up - 10 reps - 1 sets - 1x daily - 3x weekly       Treatment/Session Summary:    · Response to Treatment:  tolerated the treatment well. Girth and ROM improved.   · Communication/Consultation:  wear a supportive bra, perform HEP, no pampering the arm, use it normally  Continue with scar massage  · Equipment provided today:  None today  · Recommendations/Intent for next treatment session: Next visit will focus on assessing girth after performing 2 weeks of weights, scar massage, ROM and strengthening. Assess efficacy of wearing sleeve more and wearing a snugger fitting sports bra. She will return in 2 weeks.      Total Treatment Billable Duration:  41 minutes  PT Patient Time In/Time Out  Time In: 8939  Time Out: 6068  Sherryle Amel, PT    Future Appointments   Date Time Provider Hank Hilario   12/17/2019  8:00 AM Anastacio Gonzalez, PT [facility]

## 2019-12-17 ENCOUNTER — HOSPITAL ENCOUNTER (OUTPATIENT)
Dept: PHYSICAL THERAPY | Age: 54
Discharge: HOME | End: 2019-12-17
Payer: COMMERCIAL

## 2019-12-17 ENCOUNTER — HOSPITAL ENCOUNTER (OUTPATIENT)
Dept: ULTRASOUND IMAGING | Age: 54
Discharge: HOME | End: 2019-12-17
Attending: FAMILY MEDICINE

## 2019-12-17 DIAGNOSIS — R59.0 ANTERIOR CERVICAL LYMPHADENOPATHY: ICD-10-CM

## 2019-12-17 PROCEDURE — 97110 THERAPEUTIC EXERCISES: CPT

## 2019-12-17 NOTE — PROGRESS NOTES
Freida Wright  : 1965  Primary: Andreia Marti Of [place]*  Secondary:  Therapy Center at [facility]  [address], [address], [place] [address]  [phone]   [phone]        OUTPATIENT PHYSICAL THERAPY: Daily Treatment Note 2019  Visit Count:  8       ICD-10: Treatment Diagnosis: stiffness of left shoulder not elsewhere classified M 25.612  Other lymphedema not elsewhere classified I 89.0  Precautions/Allergies:   Sulfa (sulfonamide antibiotics) and Adhesive    TREATMENT PLAN:  Effective Dates: 10/28/2019 TO 2020 (90 days). Frequency/Duration: 2 times a week for 90 Day(s)  Plan to reassess in one month. If stable will discharge. Pre-treatment Symptoms/Complaints:  The patient reports she loves her new bra. She reports she feels her arm is better since she has been wearing her sleeve more regularly. Pain: Initial:   0/10 Post Session:  0/10   Medications Last Reviewed:  2019  Updated Objective Findings:  as below   Left flexion 175, abduction 170, external rotation 90  Edema/Girth:  none    Left Right    Initial Most Recent Initial Most Recent   Upper  Extremity Base 3rd Finger (cm): 19.5  Wrist (cm): 16.6  Mid Forearm (cm): 21.5  Elbow (cm): 26.8  Axilla (cm): 31.3(36, 36.9)         Lower  Extremity               TREATMENT:     THERAPEUTIC EXERCISE: (17 minutes):  Exercises per grid below to improve mobility. Required minimal verbal cues to promote proper body alignment. Progressed range as indicated. Assessed ROM and girth. Girth improved. Bra is a good fit. She will continue with current plan of wearing the sleeve more regularly, wearing the bra, exercising 3 days per week. [employer] PortalAccess Code: [email or web address]   URL: [email or web address]   Date: 10/28/2019   Prepared by: Jt Hart     Exercises   Chicken Wing - 10 reps - 1 sets - 5 hold - 3x daily - 7x weekly   Supine Shoulder Flexion with Dowel - 10 reps - 1 sets - 5 hold - 3x daily - 7x weekly   Supine Shoulder External Rotation with Dowel - 10 reps - 1 sets - 5 hold - 3x daily - 7x weekly   Supine Lower Trunk Rotation - 10 reps - 1 sets - 5 hold - 3x daily - 5x weekly   Scapular retraction x 10 reps with 5 count hold  Cording stretch/ moose x 10 reps with 5 count hold  Access Code: [email or web address]   URL: [email or web address]   Date: 10/31/2019   Prepared by: Melani Hartmann     Exercises   Corner Pec Major Stretch - 10 reps - 1 sets - 5 hold - 1x daily - 7x weekly   Access Code: [email or web address]   URL: [email or web address]   Date: 11/04/2019   Prepared by: Melani Hartmann     Exercises   Sidelying Thoracic Lumbar Rotation - 10 reps - 1 sets - 5 hold - 1x daily - 7x weekly   Access Code: [email or web address]   URL: [email or web address]   Date: 11/11/2019   Prepared by: Melani Hartmann     Exercises   Standing Shoulder Flexion with Dumbbells - 10 reps - 1 sets - 1x daily - 3x weekly   Shoulder Abduction with Dumbbells - Thumbs Up - 10 reps - 1 sets - 1x daily - 3x weekly   Standing Eccentric Bicep Curl Pronated then Supinated - 10 reps - 1 sets - 1x daily - 3x weekly   Standing Bent Over Single Arm Scapular Row with Table Support - 10 reps - 1 sets - 1x daily - 3x weekly   Standing Bent Over on Chair Single Arm Tricep Extension with Dumbbell - 10 reps - 1 sets - 1x daily - 3x weekly   Wall Push Up - 10 reps - 1 sets - 1x daily - 3x weekly       Treatment/Session Summary:    · Response to Treatment:  tolerated the treatment well. Girth and ROM improved. · Communication/Consultation:  wear a supportive bra, perform HEP, no pampering the arm, use it normally  Continue with scar massage  · Equipment provided today:  None today  · Recommendations/Intent for next treatment session: Next visit will focus on assessing girth and ROM.     Total Treatment Billable Duration:  17 minutes  PT Patient Time In/Time Out  Time In: 0801  Time Out: 9709  Mary Gonzalez PT    Future Appointments   Date Time Provider Hank Hilario   12/17/2019  9:30 [facility]   1/14/2020  8:00 AM [facility]   1/22/2020  9:00 AM Bharti Cheek MD [facility]

## 2020-01-14 ENCOUNTER — HOSPITAL ENCOUNTER (OUTPATIENT)
Dept: PHYSICAL THERAPY | Age: 55
Discharge: HOME | End: 2020-01-14
Payer: COMMERCIAL

## 2020-01-14 PROCEDURE — 97140 MANUAL THERAPY 1/> REGIONS: CPT

## 2020-01-14 PROCEDURE — 97110 THERAPEUTIC EXERCISES: CPT

## 2020-01-14 NOTE — PROGRESS NOTES
Flor Galdamez Crumrod  : 1965  Primary: Maritza Grambling Of [place]*  Secondary:  [facility]  [address], [place] [address]  [phone]   [phone]        OUTPATIENT PHYSICAL THERAPY: Daily Treatment Note 2020  Visit Count:  9       ICD-10: Treatment Diagnosis: stiffness of left shoulder not elsewhere classified M 25.612  Other lymphedema not elsewhere classified I 89.0  Precautions/Allergies:   Sulfa (sulfonamide antibiotics) and Adhesive    TREATMENT PLAN:  Effective Dates: 10/28/2019 TO 2020 (90 days). Frequency/Duration: 2 times a week for 90 Day(s)         Pre-treatment Symptoms/Complaints:  The patient reports her holistic doctor has suggested she use a mini trampoline for her arm. She is also taking a chemo type drug from the holistic doctor. Pain: Initial:   0/10 Post Session:  0/10   Medications Last Reviewed:  2020  Updated Objective Findings:  as below   Left flexion 175, abduction 170, external rotation 90  Edema/Girth:  none    Left Right    Initial Most Recent Initial Most Recent   Upper  Extremity Base 3rd Finger (cm): 19.5  Wrist (cm): 16.9  Mid Forearm (cm): 21.2  Elbow (cm): 26.5  Axilla (cm): 31(36, 35)         Lower  Extremity               TREATMENT:     THERAPEUTIC EXERCISE: (27 minutes):  Exercises per grid below to improve mobility. Required minimal verbal cues to promote proper body alignment. Progressed range as indicated. Assessed ROM and girth. Girth improved. The patient is wearing her garment daily. She is exercising daily. [employer] PortalAccess Code: [email or web address]   URL: [email or web address]   Date: 10/28/2019   Prepared by: Danyelle Orr     Exercises   Chicken Wing - 10 reps - 1 sets - 5 hold - 3x daily - 7x weekly   Supine Shoulder Flexion with Dowel - 10 reps - 1 sets - 5 hold - 3x daily - 7x weekly   Supine Shoulder External Rotation with Dowel - 10 reps - 1 sets - 5 hold - 3x daily - 7x weekly   Supine Lower Trunk Rotation - 10 reps - 1 sets - 5 hold - 3x daily - 5x weekly   Scapular retraction x 10 reps with 5 count hold  Cording stretch/ moose x 10 reps with 5 count hold  Access Code: [email or web address]   URL: [email or web address]   Date: 10/31/2019   Prepared by: Kristen Sheets     Exercises   Corner Pec Major Stretch - 10 reps - 1 sets - 5 hold - 1x daily - 7x weekly   Access Code: [email or web address]   URL: [email or web address]   Date: 11/04/2019   Prepared by: Kristen Sheets     Exercises   Sidelying Thoracic Lumbar Rotation - 10 reps - 1 sets - 5 hold - 1x daily - 7x weekly   Access Code: [address] [facility]   URL: [email or web address]   Date: 11/11/2019   Prepared by: Kristen Sheets     Exercises   Standing Shoulder Flexion with Dumbbells - 10 reps - 1 sets - 1x daily - 3x weekly   Shoulder Abduction with Dumbbells - Thumbs Up - 10 reps - 1 sets - 1x daily - 3x weekly   Standing Eccentric Bicep Curl Pronated then Supinated - 10 reps - 1 sets - 1x daily - 3x weekly   Standing Bent Over Single Arm Scapular Row with Table Support - 10 reps - 1 sets - 1x daily - 3x weekly   Standing Bent Over on Chair Single Arm Tricep Extension with Dumbbell - 10 reps - 1 sets - 1x daily - 3x weekly   Wall Push Up - 10 reps - 1 sets - 1x daily - 3x weekly       Treatment/Session Summary:    · Response to Treatment:  tolerated the treatment well. Girth and ROM improved. Goals met.   · Communication/Consultation:  wear a supportive bra, perform HEP, no pampering the arm, use it normally  Continue with scar massage  · Equipment provided today:  None today  · Recommendations/Intent for next treatment session: discharge    Total Treatment Billable Duration:  27 minutes  PT Patient Time In/Time Out  Time In: 0758  Time Out: 0825  Raquel Pop PT    Future Appointments   Date Time Provider Hank Hilario   1/22/2020  9:00 AM Lana Stanley MD [facility] MAIN

## 2020-01-14 NOTE — THERAPY DISCHARGE
Julio Cesar Wright  : 1965  Primary: [facility]  Secondary:  [facility]  [address] , [address], [place] [address]  [phone]   [phone]          OUTPATIENT PHYSICAL THERAPY:Discharge Summary 2020   ICD-10: Treatment Diagnosis: stiffness of left shoulder not elsewhere classified M 25.612  Other lymphedema not elsewhere classified I 89.0  Precautions/Allergies:   Sulfa (sulfonamide antibiotics); Adhesive; and Lisinopril    TREATMENT PLAN:  Effective Dates: 10/28/2019 TO 2020 (90 days). Frequency/Duration: 2 times a week for 90 Day(s) MEDICAL/REFERRING DIAGNOSIS:  Breast cancer, left ([address] [C50.912]  History of lumpectomy of left breast [Z98.890]    DATE OF ONSET:   REFERRING PHYSICIAN: Remy Esqueda MD MD Orders: oncology rehab  Return MD Appointment: 19     INITIAL ASSESSMENT:  Ms. Beena Juares presents presents following diagnosis of left breast cancer. She underwent a biopsy 19 followed by a lymph node biopsy 19. She underwent a lumpectomy and AND ( 22 nodes) 19. She has decided not to have chemo and radiation. She is very limited in left shoulder ROM. She previously had a frozen shoulder on the left. She has cording and pain which limit mobility. She is at significant risk for lymphedema. She will benefit from lymphedema eduction, ROM, strenghening and compression. 20: The patient has attended a total of 9 visits. She has made steady progress toward her goals. She has regained ROM, reduced edema and returned to previous function. She is seeing a holistic doctor who has suggested a mini trampoline for edema control. She wears her garment daily. All goals have been met. We will discharge at this time. She will return if edema increases. She may eventually need a night garment. PROBLEM LIST (Impacting functional limitations):  1. Decreased Strength  2.  Increased Pain  3. Decreased Flexibility/Joint Mobility  4. Edema/Girth  5. Decreased Knowledge of Precautions  6. Decreased [place] with Home Exercise Program INTERVENTIONS PLANNED: (Treatment may consist of any combination of the following)  1. Decongestion Therapy  2. Home Exercise Program (HEP)  3. Manual Therapy  4. Range of Motion (ROM)  5. Therapeutic Exercise/Strengthening     GOALS: (Goals have been discussed and agreed upon with patient.)  Short-Term Functional Goals: Time Frame: 4 weeks   1. The patient will be independent with HEP for ROM within 4 weeks. Met   2. The patient will gain 20 degrees flexion, abduction and external rotation within 4 weeks. Met   3. The patient will report a pain level of 3 or less within 4 weeks. Met   4. The patient will be fit with a compression sleeve and gauntlet within 4 weeks. Met   5. The patient will have knowledge of signs an symptoms of lymphedema and how to manage within 4 weeks. Met   Discharge Goals: Time Frame: 8 weeks  1. The patient will improve her DASH by 10 within 8 weeks. Met   2. The patient will gain 1/2 grade strength of left upper extremity within 8 weeks. Met     OUTCOME MEASURE:   Tool Used: Disabilities of the Arm, Shoulder and Hand (DASH) Questionnaire - Quick Version  Score:  Initial: 31/55  Most Recent: 12/55 (Date: 1/14/20 )   Interpretation of Score: The DASH is designed to measure the activities of daily living in person's with upper extremity dysfunction or pain. Each section is scored on a 1-5 scale, 5 representing the greatest disability. The scores of each section are added together for a total score of 55.         Tool Used: ECOG Performance Survey Score  Score:  Initial: 1 Most Recent:  0    Interpretation of Score:   0 Fully active, able to carry on all pre-disease performance without restriction   1 Restricted in physically strenuous activity but ambulatory and able to carry out work of a light or sedentary nature, e.g., light house work, office work   2 Ambulatory and capable of all selfcare but unable to carry out any work activities. Up and about more than 50% of waking hours   3 Capable of only limited selfcare, confined to bed or chair more than 50% of waking hours   4 Completely disabled. Cannot carry on any selfcare. Totally confined to bed or chair   5 Dead        Tool Used: Lymphedema Life Impact Scale   Score:  Initial: 18 Most Recent: 18 (Date: 1/14/20 )   Interpretation of Score: The Lymphedema Life Impact Scale (LLIS) is a validated instrument that measures the physical, functional, and psychosocial concerns pertinent to patients with extremity lymphedema. The Scale's questionnaire is administered to patients to gauge impairments, activity limitations, and participation restrictions resulting from their lymphedema. Total Duration:  PT Patient Time In/Time Out  Time In: 0758    Rehabilitation Potential For Stated Goals: Good  Regarding Michell Wright's therapy, I certify that the treatment plan above will be carried out by a therapist or under their direction. Thank you for this referral,  Mary Gonzalez, PT          PAIN/SUBJECTIVE:   Initial:   0 Post Session:  0/10   HISTORY:   History of Injury/Illness (Reason for Referral):  Lumpectomy with AND  Past Medical History/Comorbidities:   Ms. Daniel Cruz  has a past medical history of Breast cancer ([facility] [address] HTN (hypertension), and Refusal of blood transfusions as patient is Evangelical. She also has no past medical history of Adverse effect of anesthesia, Difficult intubation, Malignant hyperthermia due to anesthesia, Nausea & vomiting, or Pseudocholinesterase deficiency. Ms. Daniel Cruz  has a past surgical history that includes hx breast biopsy (Left, 08/06/2019); hx cholecystectomy; hx hysterectomy (1998); and hx breast biopsy (Left, 9/18/2019).    Past Medical History:   Diagnosis Date    Breast cancer ([address]     Left    HTN (hypertension)     Refusal of blood transfusions as patient is Yazdanism      Past Surgical History:   Procedure Laterality Date    HX BREAST BIOPSY Left 08/06/2019    HX BREAST BIOPSY Left 9/18/2019    LEFT BREAST NEEDLE LOCALIZED BIOPSY  performed by Lee Ann Diamond [facility] at [facility] [address] HX CHOLECYSTECTOMY      HX HYSTERECTOMY  1998       Social History/Living Environment:     lives with spouse  Prior Level of Function/Work/Activity:  Works from home, computer work  Dominant Side:         LEFT   Ambulatory/Rehab Services H2 Model Falls Risk Assessment   Risk Factors:       No Risk Factors Identified Ability to Rise from Chair:       (0)  Ability to rise in a single movement   Falls Prevention Plan:       No modifications necessary   Total: (5 or greater = High Risk): 0   ©2010 [facility] [address] [place] [address] States Patent #[id number]. Federal Law prohibits the replication, distribution or use without written permission from [facility]   Current Medications:       Current Outpatient Medications:     valsartan-hydroCHLOROthiazide (DIOVAN-HCT) 80-12.5 mg per tablet, Take 1 Tab by mouth daily. , Disp: 30 Tab, Rfl: 2   Date Last Reviewed:  10/28/19   Number of Personal Factors/Comorbidities that affect the Plan of Care: 1-2: MODERATE COMPLEXITY   EXAMINATION:   Palpation:            ROM:          Left flexion 170, abduction 175, external rotation 90  Strength:          Grossly 4+/5 left upper extremity  Functional Mobility:         Gait/Ambulation:  independent        Transfers:  independent        Bed Mobility:  independent  Skin Integrity:          Intact  Edema/Girth:  none    Left Right    Initial Most Recent Initial Most Recent   Upper  Extremity Base 3rd Finger (cm): 19.5  Wrist (cm): 16.9  Mid Forearm (cm): 21.2  Elbow (cm): 26.5  Axilla (cm): 31(36, 35)         Lower  Extremity               Body Structures Involved:  1. Joints  2.  Muscles Body Functions Affected:  1. Sensory/Pain  2. Neuromusculoskeletal  3. lymphatic system Activities and Participation Affected:  1.  None   Number of elements (examined above) that affect the Plan of Care: 4+: HIGH COMPLEXITY   CLINICAL PRESENTATION:   Presentation: Stable and uncomplicated: LOW COMPLEXITY   CLINICAL DECISION MAKING:   Use of outcome tool(s) and clinical judgement create a POC that gives a: Clear prediction of patient's progress: LOW COMPLEXITY   [place]

## 2020-07-31 ENCOUNTER — HOSPITAL ENCOUNTER (OUTPATIENT)
Dept: MAMMOGRAPHY | Age: 55
Discharge: HOME | End: 2020-07-31
Attending: FAMILY MEDICINE
Payer: COMMERCIAL

## 2020-07-31 DIAGNOSIS — Z12.31 VISIT FOR SCREENING MAMMOGRAM: ICD-10-CM

## 2020-07-31 PROCEDURE — 77063 BREAST TOMOSYNTHESIS BI: CPT

## 2022-03-18 PROBLEM — M70.62 TROCHANTERIC BURSITIS OF BOTH HIPS: Status: ACTIVE | Noted: 2018-06-28

## 2022-03-18 PROBLEM — G89.29 CHRONIC PAIN OF BOTH KNEES: Status: ACTIVE | Noted: 2018-06-28

## 2022-03-18 PROBLEM — M25.561 CHRONIC PAIN OF BOTH KNEES: Status: ACTIVE | Noted: 2018-06-28

## 2022-03-18 PROBLEM — M25.562 CHRONIC PAIN OF BOTH KNEES: Status: ACTIVE | Noted: 2018-06-28

## 2022-03-18 PROBLEM — M70.61 TROCHANTERIC BURSITIS OF BOTH HIPS: Status: ACTIVE | Noted: 2018-06-28

## 2022-03-19 PROBLEM — R59.0 AXILLARY ADENOPATHY: Status: ACTIVE | Noted: 2019-08-14

## 2022-03-19 PROBLEM — M47.816 SPONDYLOSIS OF LUMBAR REGION WITHOUT MYELOPATHY OR RADICULOPATHY: Status: ACTIVE | Noted: 2018-06-28

## 2022-03-19 PROBLEM — M79.7 FIBROMYALGIA: Status: ACTIVE | Noted: 2018-06-28

## 2022-03-19 PROBLEM — I10 ESSENTIAL HYPERTENSION: Status: ACTIVE | Noted: 2019-09-25

## 2022-03-20 PROBLEM — C50.112 MALIGNANT NEOPLASM OF CENTRAL PORTION OF LEFT BREAST IN FEMALE, ESTROGEN RECEPTOR POSITIVE (HCC): Status: ACTIVE | Noted: 2019-08-14

## 2022-03-20 PROBLEM — C77.3 CARCINOMA OF LEFT BREAST METASTATIC TO AXILLARY LYMPH NODE (HCC): Status: ACTIVE | Noted: 2019-09-18

## 2022-03-20 PROBLEM — C50.912 CARCINOMA OF LEFT BREAST METASTATIC TO AXILLARY LYMPH NODE (HCC): Status: ACTIVE | Noted: 2019-09-18

## 2022-03-20 PROBLEM — Z17.0 MALIGNANT NEOPLASM OF CENTRAL PORTION OF LEFT BREAST IN FEMALE, ESTROGEN RECEPTOR POSITIVE (HCC): Status: ACTIVE | Noted: 2019-08-14

## 2022-04-26 PROBLEM — M65.4 DE QUERVAIN'S TENOSYNOVITIS, LEFT: Status: ACTIVE | Noted: 2022-04-26

## 2022-04-26 NOTE — H&P (VIEW-ONLY)
Orthopaedic Hand Surgery Note    Name: Danuta Tipton  Age: 49 y.o. YOB: 1965  Gender: female  MRN: [id number]    CC: New patient referred for hand/wrist pain    HPI: Patient is a 49 y.o. female left hand dominant with a chief complaint of Left radial sided wrist pain. Pain is severe with pinching and gripping activities. Other complaints include none. The current symptoms are rated a 9/10 and interfere with ADLs. Patient has had conservative treatment for several months including anti-inflammatories and 1 steroid injection, the injection provided 100% relief but only for about 2 weeks and then symptoms started to recur, at this point she is having severe pain that severely affects her everyday life including ADLs    ROS/Meds/PSH/PMH/FH/SH: I personally reviewed the patients standard intake form. Pertinents are discussed in the HPI    Physical Examination:  General: Awake and alert. HEENT: Normocephalic, atraumatic  CV/Pulm: Breathing even and unlabored  Skin: No obvious rashes noted. Lymphatic: No obvious evidence of lymphedema or lymphadenopathy    Musculoskeletal:   Examination of the Left upper extremity demonstrates normal sensation to light touch in the median, ulnar and radial distribution, cap refill < 5 seconds in all fingers, positive tenderness at the radial styloid with  a positive Finkelstein test. No pain or crepitus at the point of the second and first compartment intersection. No pain at the thumb [facility] joint. Imaging / Electrodiagnostic Tests:     none    Assessment:   1. De Quervain's tenosynovitis, left        Plan:  We discussed the diagnosis and different treatment options. We again discussed observation, splinting, cortisone injections and surgical release of the first dorsal extensor compartment.   We discussed that de Quervain's tendinitis is a chronic condition regardless of how long the symptoms have been present, this most likely has been progressing for much longer than the symptoms were evident and it will likely persist for a long time without medical treatment. Furthermore, the vast majority of patients require surgical release at some point despite some short-term benefits of conservative treatment. After discussing the risks, benefits and alternatives of all treatment options in detail, the patient elects for left de Quervain's release. Patient voiced accordance and understanding of the information provided and the formulated plan. All questions were answered to the patient's satisfaction during the encounter. 4 This is a chronic illness/condition with exacerbation and progression  Treatment at this time: surgery     Patient understands risks and benefits of LEFT [address] including but not limited to nerve injury, vessel injury, infection, failure to achieve desired results and possible need for additional surgery. Patient understands and wishes to proceed with surgery.      On Exam:   The patient is alert and oriented; ;   Lung auscultation is clear bilaterally   Heart has RRR without murmurs      Patricia Yi MD  Orthopaedic Surgery  04/26/22  1:21 PM

## 2022-05-01 ENCOUNTER — ANESTHESIA EVENT (OUTPATIENT)
Dept: SURGERY | Age: 57
End: 2022-05-01
Payer: COMMERCIAL

## 2022-05-02 ENCOUNTER — HOSPITAL ENCOUNTER (OUTPATIENT)
Age: 57
Setting detail: OUTPATIENT SURGERY
Discharge: HOME | End: 2022-05-02
Attending: ORTHOPAEDIC SURGERY | Admitting: ORTHOPAEDIC SURGERY
Payer: COMMERCIAL

## 2022-05-02 ENCOUNTER — ANESTHESIA (OUTPATIENT)
Dept: SURGERY | Age: 57
End: 2022-05-02
Payer: COMMERCIAL

## 2022-05-02 VITALS
SYSTOLIC BLOOD PRESSURE: 113 MMHG | OXYGEN SATURATION: 99 % | TEMPERATURE: 97.3 F | RESPIRATION RATE: 15 BRPM | DIASTOLIC BLOOD PRESSURE: 75 MMHG | WEIGHT: 230 LBS | HEIGHT: 67 IN | HEART RATE: 62 BPM | BODY MASS INDEX: 36.1 KG/M2

## 2022-05-02 PROCEDURE — 77030000032 HC CUF TRNQT ZIMM -B: Performed by: ORTHOPAEDIC SURGERY

## 2022-05-02 PROCEDURE — 74011250636 HC RX REV CODE- 250/636: Performed by: ANESTHESIOLOGY

## 2022-05-02 PROCEDURE — 74011000250 HC RX REV CODE- 250: Performed by: ORTHOPAEDIC SURGERY

## 2022-05-02 PROCEDURE — 74011000250 HC RX REV CODE- 250: Performed by: NURSE ANESTHETIST, CERTIFIED REGISTERED

## 2022-05-02 PROCEDURE — 74011250636 HC RX REV CODE- 250/636: Performed by: NURSE ANESTHETIST, CERTIFIED REGISTERED

## 2022-05-02 PROCEDURE — 74011250637 HC RX REV CODE- 250/637: Performed by: ANESTHESIOLOGY

## 2022-05-02 PROCEDURE — 76210000006 HC OR PH I REC 0.5 TO 1 HR: Performed by: ORTHOPAEDIC SURGERY

## 2022-05-02 PROCEDURE — 76060000031 HC ANESTHESIA FIRST 0.5 HR: Performed by: ORTHOPAEDIC SURGERY

## 2022-05-02 PROCEDURE — 74011250636 HC RX REV CODE- 250/636: Performed by: NURSE PRACTITIONER

## 2022-05-02 PROCEDURE — 76210000020 HC REC RM PH II FIRST 0.5 HR: Performed by: ORTHOPAEDIC SURGERY

## 2022-05-02 PROCEDURE — 2709999900 HC NON-CHARGEABLE SUPPLY: Performed by: ORTHOPAEDIC SURGERY

## 2022-05-02 PROCEDURE — 77030002986 HC SUT PROL J&J -A: Performed by: ORTHOPAEDIC SURGERY

## 2022-05-02 PROCEDURE — 76010000154 HC OR TIME FIRST 0.5 HR: Performed by: ORTHOPAEDIC SURGERY

## 2022-05-02 PROCEDURE — 25000 INCISION OF TENDON SHEATH: CPT | Performed by: ORTHOPAEDIC SURGERY

## 2022-05-02 RX ORDER — BUPIVACAINE HYDROCHLORIDE 2.5 MG/ML
INJECTION, SOLUTION EPIDURAL; INFILTRATION; INTRACAUDAL AS NEEDED
Status: DISCONTINUED | OUTPATIENT
Start: 2022-05-02 | End: 2022-05-02 | Stop reason: HOSPADM

## 2022-05-02 RX ORDER — OXYCODONE HYDROCHLORIDE 5 MG/1
10 TABLET ORAL
Status: COMPLETED | OUTPATIENT
Start: 2022-05-02 | End: 2022-05-02

## 2022-05-02 RX ORDER — SODIUM CHLORIDE, SODIUM LACTATE, POTASSIUM CHLORIDE, CALCIUM CHLORIDE 600; 310; 30; 20 MG/100ML; MG/100ML; MG/100ML; MG/100ML
100 INJECTION, SOLUTION INTRAVENOUS CONTINUOUS
Status: DISCONTINUED | OUTPATIENT
Start: 2022-05-02 | End: 2022-05-02 | Stop reason: HOSPADM

## 2022-05-02 RX ORDER — FLUMAZENIL 0.1 MG/ML
0.2 INJECTION INTRAVENOUS
Status: DISCONTINUED | OUTPATIENT
Start: 2022-05-02 | End: 2022-05-02 | Stop reason: HOSPADM

## 2022-05-02 RX ORDER — SODIUM CHLORIDE 0.9 % (FLUSH) 0.9 %
5-40 SYRINGE (ML) INJECTION AS NEEDED
Status: DISCONTINUED | OUTPATIENT
Start: 2022-05-02 | End: 2022-05-02 | Stop reason: HOSPADM

## 2022-05-02 RX ORDER — LIDOCAINE HYDROCHLORIDE 10 MG/ML
0.1 INJECTION INFILTRATION; PERINEURAL AS NEEDED
Status: DISCONTINUED | OUTPATIENT
Start: 2022-05-02 | End: 2022-05-02 | Stop reason: HOSPADM

## 2022-05-02 RX ORDER — HYDROMORPHONE HYDROCHLORIDE 2 MG/ML
0.5 INJECTION, SOLUTION INTRAMUSCULAR; INTRAVENOUS; SUBCUTANEOUS
Status: DISCONTINUED | OUTPATIENT
Start: 2022-05-02 | End: 2022-05-02 | Stop reason: HOSPADM

## 2022-05-02 RX ORDER — SODIUM CHLORIDE 0.9 % (FLUSH) 0.9 %
5-40 SYRINGE (ML) INJECTION EVERY 8 HOURS
Status: DISCONTINUED | OUTPATIENT
Start: 2022-05-02 | End: 2022-05-02 | Stop reason: HOSPADM

## 2022-05-02 RX ORDER — OXYCODONE HYDROCHLORIDE 5 MG/1
5 TABLET ORAL
Status: DISCONTINUED | OUTPATIENT
Start: 2022-05-02 | End: 2022-05-02 | Stop reason: HOSPADM

## 2022-05-02 RX ORDER — LIDOCAINE HYDROCHLORIDE 20 MG/ML
INJECTION, SOLUTION EPIDURAL; INFILTRATION; INTRACAUDAL; PERINEURAL AS NEEDED
Status: DISCONTINUED | OUTPATIENT
Start: 2022-05-02 | End: 2022-05-02 | Stop reason: HOSPADM

## 2022-05-02 RX ORDER — CEFAZOLIN SODIUM/WATER 2 G/20 ML
2 SYRINGE (ML) INTRAVENOUS ONCE
Status: COMPLETED | OUTPATIENT
Start: 2022-05-02 | End: 2022-05-02

## 2022-05-02 RX ORDER — PROPOFOL 10 MG/ML
INJECTION, EMULSION INTRAVENOUS
Status: DISCONTINUED | OUTPATIENT
Start: 2022-05-02 | End: 2022-05-02 | Stop reason: HOSPADM

## 2022-05-02 RX ORDER — MIDAZOLAM HYDROCHLORIDE 1 MG/ML
2 INJECTION, SOLUTION INTRAMUSCULAR; INTRAVENOUS ONCE
Status: COMPLETED | OUTPATIENT
Start: 2022-05-02 | End: 2022-05-02

## 2022-05-02 RX ORDER — DIPHENHYDRAMINE HYDROCHLORIDE 50 MG/ML
12.5 INJECTION, SOLUTION INTRAMUSCULAR; INTRAVENOUS
Status: DISCONTINUED | OUTPATIENT
Start: 2022-05-02 | End: 2022-05-02 | Stop reason: HOSPADM

## 2022-05-02 RX ORDER — NALOXONE HYDROCHLORIDE 0.4 MG/ML
0.2 INJECTION, SOLUTION INTRAMUSCULAR; INTRAVENOUS; SUBCUTANEOUS AS NEEDED
Status: DISCONTINUED | OUTPATIENT
Start: 2022-05-02 | End: 2022-05-02 | Stop reason: HOSPADM

## 2022-05-02 RX ORDER — FENTANYL CITRATE 50 UG/ML
100 INJECTION, SOLUTION INTRAMUSCULAR; INTRAVENOUS ONCE
Status: DISCONTINUED | OUTPATIENT
Start: 2022-05-02 | End: 2022-05-02 | Stop reason: HOSPADM

## 2022-05-02 RX ORDER — LIDOCAINE HYDROCHLORIDE 10 MG/ML
INJECTION INFILTRATION; PERINEURAL AS NEEDED
Status: DISCONTINUED | OUTPATIENT
Start: 2022-05-02 | End: 2022-05-02 | Stop reason: HOSPADM

## 2022-05-02 RX ORDER — MIDAZOLAM HYDROCHLORIDE 1 MG/ML
2 INJECTION, SOLUTION INTRAMUSCULAR; INTRAVENOUS
Status: DISCONTINUED | OUTPATIENT
Start: 2022-05-02 | End: 2022-05-02 | Stop reason: HOSPADM

## 2022-05-02 RX ORDER — PROPOFOL 10 MG/ML
INJECTION, EMULSION INTRAVENOUS AS NEEDED
Status: DISCONTINUED | OUTPATIENT
Start: 2022-05-02 | End: 2022-05-02 | Stop reason: HOSPADM

## 2022-05-02 RX ORDER — ACETAMINOPHEN 500 MG
1000 TABLET ORAL ONCE
Status: DISCONTINUED | OUTPATIENT
Start: 2022-05-02 | End: 2022-05-02 | Stop reason: HOSPADM

## 2022-05-02 RX ADMIN — HYDROMORPHONE HYDROCHLORIDE 0.5 MG: 2 INJECTION INTRAMUSCULAR; INTRAVENOUS; SUBCUTANEOUS at 08:40

## 2022-05-02 RX ADMIN — HYDROMORPHONE HYDROCHLORIDE 0.5 MG: 2 INJECTION INTRAMUSCULAR; INTRAVENOUS; SUBCUTANEOUS at 08:52

## 2022-05-02 RX ADMIN — LIDOCAINE HYDROCHLORIDE 50 MG: 20 INJECTION, SOLUTION EPIDURAL; INFILTRATION; INTRACAUDAL; PERINEURAL at 08:04

## 2022-05-02 RX ADMIN — Medication 2 G: at 08:06

## 2022-05-02 RX ADMIN — PROPOFOL 100 MG: 10 INJECTION, EMULSION INTRAVENOUS at 08:04

## 2022-05-02 RX ADMIN — MIDAZOLAM 2 MG: 1 INJECTION INTRAMUSCULAR; INTRAVENOUS at 06:53

## 2022-05-02 RX ADMIN — OXYCODONE HYDROCHLORIDE 10 MG: 5 TABLET ORAL at 09:07

## 2022-05-02 RX ADMIN — PROPOFOL 140 MCG/KG/MIN: 10 INJECTION, EMULSION INTRAVENOUS at 08:05

## 2022-05-02 RX ADMIN — HYDROMORPHONE HYDROCHLORIDE 0.5 MG: 2 INJECTION INTRAMUSCULAR; INTRAVENOUS; SUBCUTANEOUS at 08:34

## 2022-05-02 RX ADMIN — SODIUM CHLORIDE, SODIUM LACTATE, POTASSIUM CHLORIDE, AND CALCIUM CHLORIDE 100 ML/HR: 600; 310; 30; 20 INJECTION, SOLUTION INTRAVENOUS at 06:30

## 2022-05-02 NOTE — ANESTHESIA PREPROCEDURE EVALUATION
Relevant Problems   CARDIOVASCULAR   (+) Essential hypertension      HEMATOLOGY   (+) suspicious left axillary node by MRI      PERSONAL HX & FAMILY HX OF CANCER   (+) Carcinoma of left breast metastatic to axillary lymph node (HCC)   (+) Malignant neoplasm of central portion of left breast in female, estrogen receptor positive ([facility] [address]       Anesthetic History   No history of anesthetic complications            Review of Systems / Medical History  Patient summary reviewed and pertinent labs reviewed    Pulmonary  Within defined limits                 Neuro/Psych   Within defined limits           Cardiovascular    Hypertension: well controlled              Exercise tolerance: >4 METS     GI/Hepatic/Renal     GERD: well controlled           Endo/Other        Obesity and arthritis     Other Findings              Physical Exam    Airway  Mallampati: II  TM Distance: > 6 cm  Neck ROM: normal range of motion   Mouth opening: Normal     Cardiovascular    Rhythm: regular  Rate: normal         Dental  No notable dental hx       Pulmonary  Breath sounds clear to auscultation               Abdominal         Other Findings            Anesthetic Plan    ASA: 2  Anesthesia type: total IV anesthesia          Induction: Intravenous  Anesthetic plan and risks discussed with: Patient and Spouse

## 2022-05-02 NOTE — NURSE NAVIGATOR
Discharge instructions reviewed with patient and significant other. No further questions or concerns at this time.

## 2022-05-02 NOTE — DISCHARGE INSTRUCTIONS
Postoperative  Instructions:      Weightbearing or Lifting:  Limit  weight  lifting  to  less  than  1  pound  (coffee  mug)  for  the  first  2  weeks  after  surgery. Dressing  instructions:    Keep  your  dressing  and/or  splint  clean  and  dry  at  all  times. You  can  remove  your  dressing  on  post-operative  day  #5  and  change  with  a  dry/sterile  dressing  or  Band-Aids  as  needed  thereafter. Showering  Instructions:  May  shower  But keep surgical dressing clean and dry until removed as explained above. After dressing is removed, you may allow soapy water to run through the incision during showers but do not scrub. After each shower, pat dry and apply a dry dressing. Do  not  soak  your  Incision in still water or bathtub  for  3  weeks  after  surgery. If  the  incision  gets  wet otherwise,  pat  dry  and  do  not  scrub  the  incision. Do  not  apply  cream  or  lotion  to  incision      Pain  Control:  - You  have  been  given  a  prescription  to  be  taken  as  directed  for  post-operative  pain  control. In  addition,  elevate  the  operative  extremity  above  the  heart  at  all  times  to  prevent  swelling  and  throbbing  pain. - If you develop constipation while taking narcotic pain medications (Norco, Hydrocodone, Percocet, Oxycodone, Dilaudid, Hydromorphone) take  over-the-counter  Colace,  100mg  by  mouth  twice  a  Day. - Nausea  is  a  common  side  effect  of  many  pain  medications. You  will  want  to  eat something  before  taking  your  pain  medicine  to  help  prevent  Nausea. - If  you  are  taking  a  prescription  pain  medication  that  contains  acetaminophen,  we  recommend  that  you  do  not  take  additional  over  the  counter  acetaminophen  (Tylenol®).       Other  pain  relieving  options:   - Using  a  cold  pack  to  ice  the  affected  area  a  few  times  a  day  (15  to  20  minutes  at  a  time)  can  help  to  relieve pain,  reduce  swelling  and  bruising.      - Elevation  of  the  affected  area  can  also  help  to  reduce  pain  and  swelling. Did  you  receive  a  nerve  Block? A  nerve  block  can  provide  pain  relief  for  one  hour  to  two  days  after  your  surgery. As  long  as  the  nerve  block  is  working,  you  will  experience  little  or  no  sensation  in  the  area  the  surgeon  operated  on. As  the  nerve  block  wears  off,  you  will  begin  to  experience  pain  or  discomfort. It  is  very  important  that  you  begin  taking  your  prescribed  pain  medication  before  the  nerve  block  fully  wears  off. The first sign that the nerve block is wearing off is tingling in your fingers. Treating  your  pain  at  the  first  sign  of  the  block  wearing  off  will  ensure  your  pain  is  better  controlled  and  more  tolerable  when  full-sensation  returns. Do  not  wait  until  the  pain  is  intolerable,  as  the  medicine  will  be  less  effective. It  is  better  to  treat  pain  in  advance  than  to  try  and  catch  up. General  Anesthesia or Sedation:      If  you  did  not  receive  a  nerve  block  during  your  surgery,  you  will  need  to  start  taking  your  pain  medication  shortly  after  your  surgery  and  should  continue  to  do  so  as  prescribed  by  your  surgeon. Please  call  [phone]  with any concern and ask to speak with Jackie Grullon. Concerning problems include:      -  Excessive  redness  of  the  incisions      -  Drainage  for  more  than  2  Days after surgery or any foul smelling drainage  -  Fever  of  more  than  101.5  F      Please  call  [phone]  if  you  do  not  receive  or  are  unsure  of  your  first  follow-up  appointment. You  should  see  the  doctor  10-14  days  after  your  Surgery. Thank you for choosing me and [address] [place] [address] for your care.  I will go above and beyond to ensure you receive the best care possible.     Brook Rubinstein, MD, PhD

## 2022-05-02 NOTE — ANESTHESIA POSTPROCEDURE EVALUATION
Procedure(s):  LEFT DE QUERVAIN'S RELEASE.    total IV anesthesia    Anesthesia Post Evaluation      Multimodal analgesia: multimodal analgesia used between 6 hours prior to anesthesia start to PACU discharge  Patient location during evaluation: PACU  Patient participation: complete - patient participated  Level of consciousness: awake and alert  Pain management: adequate  Airway patency: patent  Anesthetic complications: no  Cardiovascular status: acceptable and hemodynamically stable  Respiratory status: acceptable  Hydration status: acceptable  Post anesthesia nausea and vomiting:  controlled  Final Post Anesthesia Temperature Assessment:  Normothermia (36.0-37.5 degrees C)      INITIAL Post-op Vital signs:   Vitals Value Taken Time   /76 05/02/22 0845   Temp 36.3 °C (97.3 °F) 05/02/22 0827   Pulse 53 05/02/22 0848   Resp 15 05/02/22 0839   SpO2 99 % 05/02/22 0848   Vitals shown include unvalidated device data.

## 2022-05-02 NOTE — INTERVAL H&P NOTE
H&P Update:  Paz Aranda was seen and examined. History and physical has been reviewed. The patient has been examined.  There have been no significant clinical changes since the completion of the originally dated History and Physical.    Joycelyn Rome MD  Orthopaedic Surgery  05/02/22  6:58 AM

## 2022-05-02 NOTE — OP NOTES
Hand Surgery Operative Note      Myrna Wright   49 y.o.   female      Pre-op diagnosis: Left DeQuervain Tenosynovitis   Post op diagnosis: same      Procedure: Left First Dorsal Extensor Compartment Release     Surgeon: Danika Roman MD, PhD      Anesthesia: Local + MAC     Tourniquet time:   Total Tourniquet Time Documented:  Forearm (Left) - 10 minutes  Total: Forearm (Left) - 10 minutes        Procedure indications: Patient with recalcitrant radial sided pain consistent with DeQuervain stenosing synovitis. Conservative treatment including HEP, NSAIDs, braces and steroid injections have been tried and failed to provide long-lasting relief of pain. After Thorough discussion, the patient decided to proceed with surgical management. We discussed in detail surgical risks including scar, pain, bleeding, infection, anesthetic risks, neurovascular injury, need for further surgery,  weakness, stiffness, risk of death and potential risk of other unforseen complication. Procedure description:      Description of procedure: Patient was placed in the supine position and after appropriate time-out and side, site and procedure confirmed. Local anesthesia was infiltrated with Lidocaine 2% plain and 0.25% Bupivacaine plain. A longitudinal incision was made over the first dorsal extensor compartment, blunt dissection was use, the dorsal radial sensory nerve was identified and protected, the 1st dorsal compartment was identified and release of the EPB and APL tendon sheath retinacular tissue was done on the dorsal third of the compartment to avoid volar dislocation of the tendons afterwards. Tendon integrity was stable without fraying or subluxation post sheath release. Wound irrigated and closure in routine fashion with 4-0 prolene suture and steristrips. Sterile dressing was applied     Disposition: To PACU with no complications and follow up per routine.   Patient is instructed to remove dressings in five days and other precautions include avoidance of heavy and repetitive lifting for 2 weeks, when an appointment for follow up and suture removal will take place.      Cricket Leon MD  05/02/22  8:21 AM

## 2022-06-10 DIAGNOSIS — I10 ESSENTIAL HYPERTENSION: ICD-10-CM

## 2022-06-10 RX ORDER — VALSARTAN AND HYDROCHLOROTHIAZIDE 80; 12.5 MG/1; MG/1
1 TABLET, FILM COATED ORAL DAILY
Qty: 90 TABLET | Refills: 1 | Status: SHIPPED | OUTPATIENT
Start: 2022-06-10

## 2022-06-10 NOTE — TELEPHONE ENCOUNTER
Patient called and left a message stating that the Valsartan-HCTZ was sent to a mail order pharmacy. She stated that she uses [facility] [place] and want to see if it can be sent there instead?

## 2022-06-21 ENCOUNTER — NURSE TRIAGE (OUTPATIENT)
Dept: OTHER | Facility: CLINIC | Age: 57
End: 2022-06-21

## 2022-06-21 NOTE — TELEPHONE ENCOUNTER
Received call from [facility] with Red Flag Complaint. Subjective: Caller states \"My blood pressure has been going up lately. Talked to my doctor last week about my blood pressure, and was put on a new medication. Started taking the new medication on Sunday. Last night I started having some palpitations off and on. \"     Current Symptoms: Palpitations intermittent (none right now), /94, /83 (with blood pressure meds), mild blurred vision    Onset: 4 day ago; sudden    Associated Symptoms: reduced activity    Pain Severity: 0/10; N/A; none    Temperature: Denies      What has been tried: deep breaths and rest, BP medication (Diovan-HCT)     LMP: NA Pregnant: NA    Recommended disposition: See in Office Within 2 Weeks    Care advice provided, patient verbalizes understanding; denies any other questions or concerns; instructed to call back for any new or worsening symptoms. Patient/Caller agrees with recommended disposition; writer provided warm transfer to [facility] for appointment scheduling     Attention Provider: Thank you for allowing me to participate in the care of your patient. The patient was connected to triage in response to information provided to the ECC/PSC. Please do not respond through this encounter as the response is not directed to a shared pool.       Reason for Disposition   Systolic BP >= 091 OR Diastolic >= 80, and is taking BP medications    Protocols used: BLOOD PRESSURE - HIGH-ADULT-OH

## 2022-06-23 ENCOUNTER — TELEMEDICINE (OUTPATIENT)
Dept: FAMILY MEDICINE CLINIC | Facility: CLINIC | Age: 57
End: 2022-06-23
Payer: COMMERCIAL

## 2022-06-23 DIAGNOSIS — F41.9 ANXIETY: Primary | ICD-10-CM

## 2022-06-23 PROCEDURE — 99214 OFFICE O/P EST MOD 30 MIN: CPT | Performed by: FAMILY MEDICINE

## 2022-06-23 RX ORDER — LETROZOLE 2.5 MG/1
TABLET, FILM COATED ORAL
COMMUNITY
Start: 2022-06-15

## 2022-06-23 RX ORDER — SERTRALINE HYDROCHLORIDE 25 MG/1
25 TABLET, FILM COATED ORAL DAILY
Qty: 30 TABLET | Refills: 0 | Status: SHIPPED | OUTPATIENT
Start: 2022-06-23 | End: 2022-08-23 | Stop reason: SINTOL

## 2022-06-23 SDOH — ECONOMIC STABILITY: FOOD INSECURITY: WITHIN THE PAST 12 MONTHS, THE FOOD YOU BOUGHT JUST DIDN'T LAST AND YOU DIDN'T HAVE MONEY TO GET MORE.: NEVER TRUE

## 2022-06-23 SDOH — ECONOMIC STABILITY: FOOD INSECURITY: WITHIN THE PAST 12 MONTHS, YOU WORRIED THAT YOUR FOOD WOULD RUN OUT BEFORE YOU GOT MONEY TO BUY MORE.: NEVER TRUE

## 2022-06-23 ASSESSMENT — ENCOUNTER SYMPTOMS
SHORTNESS OF BREATH: 0
CONSTIPATION: 0
DIARRHEA: 0

## 2022-06-23 ASSESSMENT — PATIENT HEALTH QUESTIONNAIRE - PHQ9
1. LITTLE INTEREST OR PLEASURE IN DOING THINGS: 0
SUM OF ALL RESPONSES TO PHQ QUESTIONS 1-9: 0
2. FEELING DOWN, DEPRESSED OR HOPELESS: 0
SUM OF ALL RESPONSES TO PHQ9 QUESTIONS 1 & 2: 0

## 2022-06-23 ASSESSMENT — SOCIAL DETERMINANTS OF HEALTH (SDOH): HOW HARD IS IT FOR YOU TO PAY FOR THE VERY BASICS LIKE FOOD, HOUSING, MEDICAL CARE, AND HEATING?: NOT HARD AT ALL

## 2022-06-23 NOTE — PROGRESS NOTES
Guille Kimball (:  1965) is a Established patient, here for evaluation of the following:    Assessment & Plan   Below is the assessment and plan developed based on review of pertinent history, physical exam, labs, studies, and medications. 1. Anxiety  -     sertraline (ZOLOFT) 25 MG tablet; Take 1 tablet by mouth daily, Disp-30 tablet, R-0Normal    Return in about 4 weeks (around 2022) for follow up for new med. Subjective   HPI  Chief Complaint   Patient presents with    Follow-up     URGENT Care, EKG, Labs, Thyroid    Other     palpatations   she was concerned about her palpitations, and went to .  bp was running 149/92. She is consistent w/ her valsartan/hctz. bp this morning was 120s/70s. She feels like she gets really anxious and is overwhelmed with her job as a . She feels palpitations, but ekg is not indicative of this. Gets hot, and then her HR speeds up.     wellbutrin has been helping her, but then she gets a little insomnia. She was on this for hot flashes too. Review of Systems   Constitutional: Negative for appetite change, fatigue and unexpected weight change. Respiratory: Negative for shortness of breath. Cardiovascular: Positive for palpitations. Negative for chest pain. Gastrointestinal: Negative for constipation and diarrhea. Psychiatric/Behavioral: Positive for sleep disturbance. Negative for agitation, behavioral problems, decreased concentration, dysphoric mood and suicidal ideas. The patient is nervous/anxious.            Objective     Physical Exam  [INSTRUCTIONS:  \"[x]\" Indicates a positive item  \"[]\" Indicates a negative item  -- DELETE ALL ITEMS NOT EXAMINED]    Constitutional: [x] Appears well-developed and well-nourished [x] No apparent distress      [] Abnormal -     Mental status: [x] Alert and awake  [x] Oriented to person/place/time [x] Able to follow commands    [] Abnormal -     Eyes:   EOM    [x]  Normal    [] Abnormal - Sclera  [x]  Normal    [] Abnormal -          Discharge [x]  None visible   [] Abnormal -     HENT: [x] Normocephalic, atraumatic  [] Abnormal -   [x] Mouth/Throat: Mucous membranes are moist    External Ears [x] Normal  [] Abnormal -    Neck: [x] No visualized mass [] Abnormal -     Pulmonary/Chest: [x] Respiratory effort normal   [x] No visualized signs of difficulty breathing or respiratory distress        [] Abnormal -      Musculoskeletal:   [x] Normal gait with no signs of ataxia         [x] Normal range of motion of neck        [] Abnormal -     Neurological:        [x] No Facial Asymmetry (Cranial nerve 7 motor function) (limited exam due to video visit)          [x] No gaze palsy        [] Abnormal -          Skin:        [x] No significant exanthematous lesions or discoloration noted on facial skin         [] Abnormal -            Psychiatric:       [x] Normal Affect [] Abnormal -        [x] No Hallucinations    Other pertinent observable physical exam findings:-           No flowsheet data found. Patient-Reported Vitals  No data recorded       [facility], was evaluated through a synchronous (real-time) audio-video encounter. The patient (or guardian if applicable) is aware that this is a billable service, which includes applicable co-pays. This Virtual Visit was conducted with patient's (and/or legal guardian's) consent. The visit was conducted pursuant to the emergency declaration under the [address], [facility] waiver authority and the [employer] and [employer] General Act. Patient identification was verified, and a caregiver was present when appropriate. The patient was located at Home: [address] [place] [address].    Provider was located at Home ([address]): Mark Parekh MD

## 2022-08-23 ENCOUNTER — TELEMEDICINE (OUTPATIENT)
Dept: FAMILY MEDICINE CLINIC | Facility: CLINIC | Age: 57
End: 2022-08-23
Payer: COMMERCIAL

## 2022-08-23 DIAGNOSIS — F41.9 ANXIETY: Primary | ICD-10-CM

## 2022-08-23 DIAGNOSIS — K21.9 GASTROESOPHAGEAL REFLUX DISEASE, UNSPECIFIED WHETHER ESOPHAGITIS PRESENT: ICD-10-CM

## 2022-08-23 PROCEDURE — 99214 OFFICE O/P EST MOD 30 MIN: CPT | Performed by: FAMILY MEDICINE

## 2022-08-23 RX ORDER — BUPROPION HYDROCHLORIDE 150 MG/1
150 TABLET ORAL DAILY
Qty: 30 TABLET | Refills: 2 | Status: SHIPPED | OUTPATIENT
Start: 2022-08-23

## 2022-08-23 RX ORDER — FAMOTIDINE 40 MG/1
40 TABLET, FILM COATED ORAL DAILY
Qty: 90 TABLET | Refills: 1 | Status: SHIPPED | OUTPATIENT
Start: 2022-08-23

## 2022-08-23 ASSESSMENT — ENCOUNTER SYMPTOMS
DIARRHEA: 0
SHORTNESS OF BREATH: 0
CONSTIPATION: 0

## 2022-08-23 ASSESSMENT — PATIENT HEALTH QUESTIONNAIRE - PHQ9
2. FEELING DOWN, DEPRESSED OR HOPELESS: 0
SUM OF ALL RESPONSES TO PHQ QUESTIONS 1-9: 0
SUM OF ALL RESPONSES TO PHQ QUESTIONS 1-9: 0
SUM OF ALL RESPONSES TO PHQ9 QUESTIONS 1 & 2: 0
SUM OF ALL RESPONSES TO PHQ QUESTIONS 1-9: 0
SUM OF ALL RESPONSES TO PHQ QUESTIONS 1-9: 0
1. LITTLE INTEREST OR PLEASURE IN DOING THINGS: 0

## 2022-08-23 NOTE — PROGRESS NOTES
Indicates a positive item  \"[]\" Indicates a negative item  -- DELETE ALL ITEMS NOT EXAMINED]    Constitutional: [x] Appears well-developed and well-nourished [x] No apparent distress      [] Abnormal -     Mental status: [x] Alert and awake  [x] Oriented to person/place/time [x] Able to follow commands    [] Abnormal -     Eyes:   EOM    [x]  Normal    [] Abnormal -   Sclera  [x]  Normal    [] Abnormal -          Discharge [x]  None visible   [] Abnormal -     HENT: [x] Normocephalic, atraumatic  [] Abnormal -   [x] Mouth/Throat: Mucous membranes are moist    External Ears [x] Normal  [] Abnormal -    Neck: [x] No visualized mass [] Abnormal -     Pulmonary/Chest: [x] Respiratory effort normal   [x] No visualized signs of difficulty breathing or respiratory distress        [] Abnormal -      Musculoskeletal:   [x] Normal gait with no signs of ataxia         [x] Normal range of motion of neck        [] Abnormal -     Neurological:        [x] No Facial Asymmetry (Cranial nerve 7 motor function) (limited exam due to video visit)          [x] No gaze palsy        [] Abnormal -          Skin:        [x] No significant exanthematous lesions or discoloration noted on facial skin         [] Abnormal -            Psychiatric:       [x] Normal Affect [] Abnormal -        [x] No Hallucinations    Other pertinent observable physical exam findings:-       Patient-Reported Vitals 8/23/2022   Patient-Reported Weight 238   Patient-Reported Height 57   Patient-Reported Systolic 614   Patient-Reported Diastolic 76      Patient-Reported Vitals  No data recorded         [facility], was evaluated through a synchronous (real-time) audio-video encounter. The patient (or guardian if applicable) is aware that this is a billable service, which includes applicable co-pays. This Virtual Visit was conducted with patient's (and/or legal guardian's) consent.  The visit was conducted pursuant to the emergency declaration under the [address] [facility] [address] waiver authority and the [employer] and [employer] General Act. Patient identification was verified, and a caregiver was present when appropriate. The patient was located at Home: [address] [place] [address].    Provider was located at Home ([address] [facility] [address]): Krissy Alcantara MD

## 2022-08-23 NOTE — LETTER
[facility]  [facility] [address]  Phone: [phone]  Fax: [phone]     August 23, 2022        To Whom it May Concern:    Natalie Chacko is under my medical care and would function best working from home due to her medical conditions.      Sincerely,        Alicia Plaza MD

## 2022-12-05 DIAGNOSIS — I10 ESSENTIAL HYPERTENSION: ICD-10-CM

## 2022-12-06 RX ORDER — VALSARTAN AND HYDROCHLOROTHIAZIDE 80; 12.5 MG/1; MG/1
1 TABLET, FILM COATED ORAL DAILY
Qty: 90 TABLET | Refills: 0 | Status: SHIPPED | OUTPATIENT
Start: 2022-12-06

## 2023-01-06 DIAGNOSIS — K21.9 GASTROESOPHAGEAL REFLUX DISEASE, UNSPECIFIED WHETHER ESOPHAGITIS PRESENT: ICD-10-CM

## 2023-01-06 DIAGNOSIS — F41.9 ANXIETY: ICD-10-CM

## 2023-01-09 RX ORDER — FAMOTIDINE 40 MG/1
40 TABLET, FILM COATED ORAL DAILY
Qty: 90 TABLET | Refills: 1 | Status: SHIPPED | OUTPATIENT
Start: 2023-01-09

## 2023-01-09 RX ORDER — BUPROPION HYDROCHLORIDE 150 MG/1
150 TABLET ORAL DAILY
Qty: 30 TABLET | Refills: 2 | Status: SHIPPED | OUTPATIENT
Start: 2023-01-09

## 2023-02-23 ENCOUNTER — TELEMEDICINE (OUTPATIENT)
Dept: FAMILY MEDICINE CLINIC | Facility: CLINIC | Age: 58
End: 2023-02-23
Payer: COMMERCIAL

## 2023-02-23 DIAGNOSIS — I10 ESSENTIAL HYPERTENSION: ICD-10-CM

## 2023-02-23 PROCEDURE — 99213 OFFICE O/P EST LOW 20 MIN: CPT | Performed by: FAMILY MEDICINE

## 2023-02-23 RX ORDER — VALSARTAN AND HYDROCHLOROTHIAZIDE 80; 12.5 MG/1; MG/1
1 TABLET, FILM COATED ORAL DAILY
Qty: 90 TABLET | Refills: 1 | Status: SHIPPED | OUTPATIENT
Start: 2023-02-23

## 2023-02-23 SDOH — ECONOMIC STABILITY: FOOD INSECURITY: WITHIN THE PAST 12 MONTHS, YOU WORRIED THAT YOUR FOOD WOULD RUN OUT BEFORE YOU GOT MONEY TO BUY MORE.: NEVER TRUE

## 2023-02-23 SDOH — ECONOMIC STABILITY: FOOD INSECURITY: WITHIN THE PAST 12 MONTHS, THE FOOD YOU BOUGHT JUST DIDN'T LAST AND YOU DIDN'T HAVE MONEY TO GET MORE.: NEVER TRUE

## 2023-02-23 SDOH — ECONOMIC STABILITY: INCOME INSECURITY: HOW HARD IS IT FOR YOU TO PAY FOR THE VERY BASICS LIKE FOOD, HOUSING, MEDICAL CARE, AND HEATING?: NOT HARD AT ALL

## 2023-02-23 SDOH — ECONOMIC STABILITY: HOUSING INSECURITY
IN THE LAST 12 MONTHS, WAS THERE A TIME WHEN YOU DID NOT HAVE A STEADY PLACE TO SLEEP OR SLEPT IN A SHELTER (INCLUDING NOW)?: NO

## 2023-02-23 ASSESSMENT — ENCOUNTER SYMPTOMS
SHORTNESS OF BREATH: 0
VOMITING: 0
CONSTIPATION: 0
DIARRHEA: 0
COUGH: 0

## 2023-02-23 ASSESSMENT — PATIENT HEALTH QUESTIONNAIRE - PHQ9
2. FEELING DOWN, DEPRESSED OR HOPELESS: 0
SUM OF ALL RESPONSES TO PHQ9 QUESTIONS 1 & 2: 0
SUM OF ALL RESPONSES TO PHQ QUESTIONS 1-9: 0
1. LITTLE INTEREST OR PLEASURE IN DOING THINGS: 0

## 2023-02-23 NOTE — PROGRESS NOTES
Sangeeta Camilo (:  1965) is a Established patient, here for evaluation of the following:    Assessment & Plan   Below is the assessment and plan developed based on review of pertinent history, physical exam, labs, studies, and medications. 1. Essential hypertension  -     valsartan-hydroCHLOROthiazide (DIOVAN-HCT) 80-12.5 MG per tablet; Take 1 tablet by mouth daily, Disp-90 tablet, R-1Normal  -     Comprehensive Metabolic Panel; Future  -     Lipid Panel; Future    Return for soon for labs, 6 mo f/u IOV. Subjective   HPI  Chief Complaint   Patient presents with    Medication Refill   F/U HTN - BP well controlled. Compliant w/ med(s). Denies s/s target organ damage. Home bp well controlled 107/68 this am.  Doing deep breathing exercises throughout the day. No sxs of fatigue or dizziness. Feeling well. Sees her oncologist every 6 months. Review of Systems   Constitutional:  Negative for diaphoresis and unexpected weight change. HENT:  Negative for congestion. Eyes:  Negative for visual disturbance. Respiratory:  Negative for cough and shortness of breath. Cardiovascular:  Negative for chest pain. Gastrointestinal:  Negative for constipation, diarrhea and vomiting. Genitourinary:  Negative for dysuria. Neurological:  Negative for headaches. Psychiatric/Behavioral:  Negative for dysphoric mood and sleep disturbance. The patient is not nervous/anxious.          Objective     Physical Exam  [INSTRUCTIONS:  \"[x]\" Indicates a positive item  \"[]\" Indicates a negative item  -- DELETE ALL ITEMS NOT EXAMINED]    Constitutional: [x] Appears well-developed and well-nourished [x] No apparent distress      [] Abnormal -     Mental status: [x] Alert and awake  [x] Oriented to person/place/time [x] Able to follow commands    [] Abnormal -     Eyes:   EOM    [x]  Normal    [] Abnormal -   Sclera  [x]  Normal    [] Abnormal -          Discharge [x]  None visible   [] Abnormal -     HENT: [x] Normocephalic, atraumatic  [] Abnormal -   [x] Mouth/Throat: Mucous membranes are moist    External Ears [x] Normal  [] Abnormal -    Neck: [x] No visualized mass [] Abnormal -     Pulmonary/Chest: [x] Respiratory effort normal   [x] No visualized signs of difficulty breathing or respiratory distress        [] Abnormal -      Musculoskeletal:   [x] Normal gait with no signs of ataxia         [x] Normal range of motion of neck        [] Abnormal -     Neurological:        [x] No Facial Asymmetry (Cranial nerve 7 motor function) (limited exam due to video visit)          [x] No gaze palsy        [] Abnormal -          Skin:        [x] No significant exanthematous lesions or discoloration noted on facial skin         [] Abnormal -            Psychiatric:       [x] Normal Affect [] Abnormal -        [x] No Hallucinations    Other pertinent observable physical exam findings:-       Patient-Reported Vitals 2/23/2023   Patient-Reported Weight 230   Patient-Reported Height -   Patient-Reported Systolic 483   Patient-Reported Diastolic 68      Patient-Reported Vitals  Patient-Reported Systolic (Top): 220 mmHg  Patient-Reported Diastolic (Bottom): 68 mmHg  BP Observations: Yes, BP was taken on electronic monitoring device with digital readout  Patient-Reported Weight: 230       [employer], was evaluated through a synchronous (real-time) audio-video encounter. The patient (or guardian if applicable) is aware that this is a billable service, which includes applicable co-pays. This Virtual Visit was conducted with patient's (and/or legal guardian's) consent. The visit was conducted pursuant to the emergency declaration under the [address] [place] [address] authority and the [employer] and [employer] General Act. Patient identification was verified, and a caregiver was present when appropriate.    The patient was located at Home: [address] [place] [address]  Provider [facility] Darlene Salcido MD

## 2023-02-28 DIAGNOSIS — I10 ESSENTIAL HYPERTENSION: ICD-10-CM

## 2023-02-28 LAB
ALBUMIN SERPL-MCNC: 3.3 G/DL (ref 3.5–5)
ALBUMIN/GLOB SERPL: 0.8 (ref 0.4–1.6)
ALP SERPL-CCNC: 88 U/L (ref 50–136)
ALT SERPL-CCNC: 23 U/L (ref 12–65)
ANION GAP SERPL CALC-SCNC: 2 MMOL/L (ref 2–11)
AST SERPL-CCNC: 16 U/L (ref 15–37)
BILIRUB SERPL-MCNC: 0.3 MG/DL (ref 0.2–1.1)
BUN SERPL-MCNC: 9 MG/DL (ref 6–23)
CALCIUM SERPL-MCNC: 9.5 MG/DL (ref 8.3–10.4)
CHLORIDE SERPL-SCNC: 106 MMOL/L (ref 101–110)
CHOLEST SERPL-MCNC: 194 MG/DL
CO2 SERPL-SCNC: 30 MMOL/L (ref 21–32)
CREAT SERPL-MCNC: 0.8 MG/DL (ref 0.6–1)
GLOBULIN SER CALC-MCNC: 4.1 G/DL (ref 2.8–4.5)
GLUCOSE SERPL-MCNC: 90 MG/DL (ref 65–100)
HDLC SERPL-MCNC: 56 MG/DL (ref 40–60)
HDLC SERPL: 3.5
LDLC SERPL CALC-MCNC: 126.4 MG/DL
POTASSIUM SERPL-SCNC: 3.7 MMOL/L (ref 3.5–5.1)
PROT SERPL-MCNC: 7.4 G/DL (ref 6.3–8.2)
SODIUM SERPL-SCNC: 138 MMOL/L (ref 133–143)
TRIGL SERPL-MCNC: 58 MG/DL (ref 35–150)
VLDLC SERPL CALC-MCNC: 11.6 MG/DL (ref 6–23)

## 2023-04-10 DIAGNOSIS — F41.9 ANXIETY: ICD-10-CM

## 2023-04-10 RX ORDER — BUPROPION HYDROCHLORIDE 150 MG/1
150 TABLET ORAL DAILY
Qty: 30 TABLET | Refills: 3 | Status: SHIPPED | OUTPATIENT
Start: 2023-04-10

## 2023-07-13 ENCOUNTER — OFFICE VISIT (OUTPATIENT)
Dept: ORTHOPEDIC SURGERY | Age: 58
End: 2023-07-13
Payer: COMMERCIAL

## 2023-07-13 VITALS — HEIGHT: 67 IN | WEIGHT: 240 LBS | BODY MASS INDEX: 37.67 KG/M2

## 2023-07-13 DIAGNOSIS — M17.11 PRIMARY OSTEOARTHRITIS OF RIGHT KNEE: ICD-10-CM

## 2023-07-13 DIAGNOSIS — M25.562 LEFT KNEE PAIN, UNSPECIFIED CHRONICITY: ICD-10-CM

## 2023-07-13 DIAGNOSIS — M25.561 RIGHT KNEE PAIN, UNSPECIFIED CHRONICITY: Primary | ICD-10-CM

## 2023-07-13 DIAGNOSIS — M17.12 PRIMARY OSTEOARTHRITIS OF LEFT KNEE: ICD-10-CM

## 2023-07-13 DIAGNOSIS — E66.01 SEVERE OBESITY (BMI 35.0-39.9) WITH COMORBIDITY (HCC): ICD-10-CM

## 2023-07-13 PROCEDURE — 99204 OFFICE O/P NEW MOD 45 MIN: CPT | Performed by: ORTHOPAEDIC SURGERY

## 2023-07-13 NOTE — PATIENT INSTRUCTIONS
Patient Education        Knee Arthritis: Care Instructions  Overview     Knee arthritis is a breakdown of the cartilage that cushions your knee joint. When the cartilage wears down, your bones rub against each other. This causes pain and stiffness. Knee arthritis tends to get worse with time. Treatment for knee arthritis involves reducing pain, making the leg muscles stronger, and staying at a healthy body weight. The treatment usually does not improve the health of the cartilage, but it can reduce pain and improve how well your knee works. You can take simple measures to protect your knee joints, ease your pain, and help you stay active. Follow-up care is a key part of your treatment and safety. Be sure to make and go to all appointments, and call your doctor if you are having problems. It's also a good idea to know your test results and keep a list of the medicines you take. How can you care for yourself at home? Know that knee arthritis will cause more pain on some days than on others. Stay at a healthy weight. Lose weight if you are overweight. When you stand up, the pressure on your knees from every pound of body weight is multiplied four times. So if you lose 10 pounds, you will reduce the pressure on your knees by 40 pounds. Talk to your doctor or physical therapist about exercises that will help ease joint pain. Stretch to help prevent stiffness and to prevent injury before you exercise. You may enjoy gentle forms of yoga to help keep your knee joints and muscles flexible. Walk instead of jog. Ride a bike. This makes your thigh muscles stronger and takes pressure off your knee. Wear well-fitting and comfortable shoes. Exercise in chest-deep water. This can help you exercise longer with less pain. Avoid exercises that include squatting or kneeling. They can put a lot of strain on your knees. Talk to your doctor to make sure that the exercise you do is not making the arthritis worse.   Do not sit

## 2023-07-13 NOTE — PROGRESS NOTES
Viscosupplementation injections may also be useful as a temporary cartilage replacement in certain patients. I advised to avoid any prolonged bedrest and to try to maintain ADLs as much as possible. The patient was counseled to follow up with me should she develop any worsening symptoms that begin to limit activities of daily living.     ----The patient will be treated observantly with self directed symptomatic care. Instructions were given to follow up if there is any neurologic or functional decline.  ---- A home exercise program was prescribed for stretching and strengthening. A list of exercises was provided. ----Weight Loss: We discussed that pain and problems in the hip and/or knee may be aggravated by obesity. The effectiveness of joint replacement surgery can also be affected by a patient's weight. Obese patients are higher risk of infections and failure of hardware due to weight stresses. We discussed that attention to weight loss before undergoing surgery may improve the healing process and overall outcome. Weight loss options include: diet, exercise, nutritional support and bariatric surgery. ---- Topical NSAID: The patient is agreeable to a trial of topical nonsteroidal anti-inflammatory drugs (NSAIDs). The patient was prescribed Diclofenac topical.  ---- Injection: The patient will be pre-certed for a visco injection to help reduce the symptoms. The patient understands the risks and benefits of the medication. The patient may benefit from additional injections depending on the response.        4--this is an undiagnosed new problem with uncertain prognosis

## 2023-08-03 ENCOUNTER — OFFICE VISIT (OUTPATIENT)
Dept: ORTHOPEDIC SURGERY | Age: 58
End: 2023-08-03

## 2023-08-03 DIAGNOSIS — M17.12 PRIMARY OSTEOARTHRITIS OF LEFT KNEE: Primary | ICD-10-CM

## 2023-08-03 DIAGNOSIS — M17.11 PRIMARY OSTEOARTHRITIS OF RIGHT KNEE: ICD-10-CM

## 2023-08-24 ENCOUNTER — OFFICE VISIT (OUTPATIENT)
Dept: FAMILY MEDICINE CLINIC | Facility: CLINIC | Age: 58
End: 2023-08-24
Payer: COMMERCIAL

## 2023-08-24 VITALS
TEMPERATURE: 97 F | RESPIRATION RATE: 19 BRPM | HEIGHT: 67 IN | DIASTOLIC BLOOD PRESSURE: 68 MMHG | WEIGHT: 242 LBS | BODY MASS INDEX: 37.98 KG/M2 | SYSTOLIC BLOOD PRESSURE: 125 MMHG | HEART RATE: 89 BPM | OXYGEN SATURATION: 100 %

## 2023-08-24 DIAGNOSIS — I10 ESSENTIAL HYPERTENSION: ICD-10-CM

## 2023-08-24 DIAGNOSIS — E66.01 CLASS 2 SEVERE OBESITY DUE TO EXCESS CALORIES WITH SERIOUS COMORBIDITY AND BODY MASS INDEX (BMI) OF 37.0 TO 37.9 IN ADULT (HCC): Primary | ICD-10-CM

## 2023-08-24 DIAGNOSIS — F41.9 ANXIETY: ICD-10-CM

## 2023-08-24 LAB
ALBUMIN SERPL-MCNC: 3.2 G/DL (ref 3.5–5)
ALBUMIN/GLOB SERPL: 0.7 (ref 0.4–1.6)
ALP SERPL-CCNC: 121 U/L (ref 50–136)
ALT SERPL-CCNC: 23 U/L (ref 12–65)
ANION GAP SERPL CALC-SCNC: 3 MMOL/L (ref 2–11)
AST SERPL-CCNC: 19 U/L (ref 15–37)
BILIRUB SERPL-MCNC: 0.5 MG/DL (ref 0.2–1.1)
BUN SERPL-MCNC: 6 MG/DL (ref 6–23)
CALCIUM SERPL-MCNC: 9.6 MG/DL (ref 8.3–10.4)
CHLORIDE SERPL-SCNC: 108 MMOL/L (ref 101–110)
CHOLEST SERPL-MCNC: 186 MG/DL
CO2 SERPL-SCNC: 28 MMOL/L (ref 21–32)
CREAT SERPL-MCNC: 0.9 MG/DL (ref 0.6–1)
EST. AVERAGE GLUCOSE BLD GHB EST-MCNC: 111 MG/DL
GLOBULIN SER CALC-MCNC: 4.8 G/DL (ref 2.8–4.5)
GLUCOSE SERPL-MCNC: 93 MG/DL (ref 65–100)
HBA1C MFR BLD: 5.5 % (ref 4.8–5.6)
HDLC SERPL-MCNC: 54 MG/DL (ref 40–60)
HDLC SERPL: 3.4
LDLC SERPL CALC-MCNC: 122 MG/DL
POTASSIUM SERPL-SCNC: 3.8 MMOL/L (ref 3.5–5.1)
PROT SERPL-MCNC: 8 G/DL (ref 6.3–8.2)
SODIUM SERPL-SCNC: 139 MMOL/L (ref 133–143)
TRIGL SERPL-MCNC: 50 MG/DL (ref 35–150)
VLDLC SERPL CALC-MCNC: 10 MG/DL (ref 6–23)

## 2023-08-24 PROCEDURE — 3074F SYST BP LT 130 MM HG: CPT | Performed by: FAMILY MEDICINE

## 2023-08-24 PROCEDURE — 99214 OFFICE O/P EST MOD 30 MIN: CPT | Performed by: FAMILY MEDICINE

## 2023-08-24 PROCEDURE — 3078F DIAST BP <80 MM HG: CPT | Performed by: FAMILY MEDICINE

## 2023-08-24 RX ORDER — HYDROXYZINE PAMOATE 25 MG/1
25 CAPSULE ORAL 3 TIMES DAILY PRN
Qty: 30 CAPSULE | Refills: 0 | Status: SHIPPED | OUTPATIENT
Start: 2023-08-24 | End: 2023-09-07

## 2023-08-24 RX ORDER — ESOMEPRAZOLE MAGNESIUM 40 MG/1
CAPSULE, DELAYED RELEASE ORAL
COMMUNITY
Start: 2023-07-18

## 2023-08-24 RX ORDER — BUPROPION HYDROCHLORIDE 150 MG/1
150 TABLET ORAL DAILY
Qty: 90 TABLET | Refills: 1 | Status: SHIPPED | OUTPATIENT
Start: 2023-08-24

## 2023-08-24 RX ORDER — VALSARTAN AND HYDROCHLOROTHIAZIDE 80; 12.5 MG/1; MG/1
1 TABLET, FILM COATED ORAL DAILY
Qty: 90 TABLET | Refills: 1 | Status: SHIPPED | OUTPATIENT
Start: 2023-08-24

## 2023-08-24 ASSESSMENT — PATIENT HEALTH QUESTIONNAIRE - PHQ9
1. LITTLE INTEREST OR PLEASURE IN DOING THINGS: SEVERAL DAYS
SUM OF ALL RESPONSES TO PHQ QUESTIONS 1-9: 1
SUM OF ALL RESPONSES TO PHQ QUESTIONS 1-9: 1
SUM OF ALL RESPONSES TO PHQ9 QUESTIONS 1 & 2: 1
2. FEELING DOWN, DEPRESSED OR HOPELESS: 0
1. LITTLE INTEREST OR PLEASURE IN DOING THINGS: 1
2. FEELING DOWN, DEPRESSED OR HOPELESS: NOT AT ALL
SUM OF ALL RESPONSES TO PHQ QUESTIONS 1-9: 1
SUM OF ALL RESPONSES TO PHQ9 QUESTIONS 1 & 2: 1
SUM OF ALL RESPONSES TO PHQ QUESTIONS 1-9: 1

## 2023-08-24 ASSESSMENT — ENCOUNTER SYMPTOMS
COUGH: 0
VOMITING: 0
CONSTIPATION: 0
DIARRHEA: 0
SHORTNESS OF BREATH: 0

## 2023-08-24 NOTE — PROGRESS NOTES
Jeronimo Sanchez (:  1965) is a 66 y.o. female,Established patient, here for evaluation of the following chief complaint(s):  6 Month Follow-Up (On HTN.), Anxiety (Will have a weir feeling in her stomach, and will have a hard time falling a sleep.), and Other (Has been over 10 years since the last Tdap and Pneumonia shots.)         ASSESSMENT/PLAN:  1. Class 2 severe obesity due to excess calories with serious comorbidity and body mass index (BMI) of 37.0 to 37.9 in adult (HCC)  -     Hemoglobin A1C; Future  2. Anxiety  -     buPROPion (WELLBUTRIN XL) 150 MG extended release tablet; Take 1 tablet by mouth daily, Disp-90 tablet, R-1Normal  -     hydrOXYzine pamoate (VISTARIL) 25 MG capsule; Take 1 capsule by mouth 3 times daily as needed for Itching, Disp-30 capsule, R-0Normal  3. Essential hypertension  -     valsartan-hydroCHLOROthiazide (DIOVAN-HCT) 80-12.5 MG per tablet; Take 1 tablet by mouth daily, Disp-90 tablet, R-1Normal  -     Comprehensive Metabolic Panel; Future  -     Lipid Panel; Future  C/w current regimen. Adding vistaril to help w/ night time anxiety. If not helping, may need to add SSRI or consider switching from wellbutrin to an SSRI, although I think the wellbutrin is helping with some of her side effects from femara. No follow-ups on file. Subjective   SUBJECTIVE/OBJECTIVE:  HPI  Chief Complaint   Patient presents with    6 Month Follow-Up     On HTN. Anxiety     Will have a weir feeling in her stomach, and will have a hard time falling a sleep. Other     Has been over 10 years since the last Tdap and Pneumonia shots. F/U HTN - BP well controlled. Compliant w/ med(s). Denies s/s target organ damage. F/U anxiety - well controlled on current regimen w/o adverse effects. Denies oversedation. Seeing ortho and getting gel shots in knees with are helping some. Review of Systems   Constitutional:  Negative for diaphoresis and unexpected weight change.

## 2023-10-04 ENCOUNTER — OFFICE VISIT (OUTPATIENT)
Dept: ORTHOPEDIC SURGERY | Age: 58
End: 2023-10-04

## 2023-10-04 DIAGNOSIS — M65.4 RADIAL STYLOID TENOSYNOVITIS (DE QUERVAIN): Primary | ICD-10-CM

## 2023-10-04 RX ORDER — BETAMETHASONE SODIUM PHOSPHATE AND BETAMETHASONE ACETATE 3; 3 MG/ML; MG/ML
6 INJECTION, SUSPENSION INTRA-ARTICULAR; INTRALESIONAL; INTRAMUSCULAR; SOFT TISSUE ONCE
Status: COMPLETED | OUTPATIENT
Start: 2023-10-04 | End: 2023-10-04

## 2023-10-04 RX ADMIN — BETAMETHASONE SODIUM PHOSPHATE AND BETAMETHASONE ACETATE 6 MG: 3; 3 INJECTION, SUSPENSION INTRA-ARTICULAR; INTRALESIONAL; INTRAMUSCULAR; SOFT TISSUE at 11:13

## 2023-10-04 NOTE — PROGRESS NOTES
Orthopaedic Hand Surgery Note    Name: Jacinta Pisano  Age: 66 y.o. YOB: 1965  Gender: female  MRN: [id number]    Follow up: DeQuervain's tendinitis. HPI: Patient is a 66 y.o. female who return for evaluation of  right 1st dorsal compartment tendinitis. Last visit we provided surgery for the left hand, she did very well with that, on the left side she only had 1 steroid injection by her PCP and then she proceeded to surgery. She reports the symptoms on the right side are very similar to what she had on the left, severe pain at the radial styloid which interferes with her everyday life. ROS/Meds/PSH/PMH/FH/SH: I personally reviewed the patients standard intake form. Pertinents are discussed in the HPI    Physical Examination:  General: Awake and alert. HEENT: Normocephalic, atraumatic  CV/Pulm: Breathing even and unlabored  Skin: No obvious rashes noted. Lymphatic: No obvious evidence of lymphedema or lymphadenopathy    Musculoskeletal:   Examination of the right upper extremity demonstrates normal sensation to light touch in the median, ulnar and radial distribution, cap refill < 5 seconds in all fingers, Positive tenderness at the radial styloid with a positive Finkelstein test. No pain or crepitus at the point of the second and first compartment intersection. No pain at the thumb [facility] joint. Imaging / Electrodiagnostic Tests:     none    Assessment:   1. Radial styloid tenosynovitis (de quervain)        Plan:  We discussed the diagnosis and different treatment options. We again discussed observation, splinting, cortisone injections and surgical release of the first dorsal extensor compartment. We discussed that de Quervain's tendinitis is a chronic condition regardless of how long the symptoms have been present, this most likely has been progressing for  much longer than the symptoms were evident and it will likely persist for a long time without medical treatment.

## 2023-12-06 ENCOUNTER — OFFICE VISIT (OUTPATIENT)
Dept: ORTHOPEDIC SURGERY | Age: 58
End: 2023-12-06
Payer: COMMERCIAL

## 2023-12-06 DIAGNOSIS — M65.4 DE QUERVAIN'S TENOSYNOVITIS, RIGHT: Primary | ICD-10-CM

## 2023-12-06 PROCEDURE — 99213 OFFICE O/P EST LOW 20 MIN: CPT | Performed by: ORTHOPAEDIC SURGERY

## 2023-12-06 NOTE — PROGRESS NOTES
Orthopaedic Hand Surgery Note    Name: Danyelle Zuñiga  Age: 66 y.o. YOB: 1965  Gender: female  MRN: [id number]    Follow up: DeQuervain's tendinitis. HPI: Patient is a 66 y.o. female who return for evaluation of  right 1st dorsal compartment tendinitis. Last visit we provided steroid injection. Patient reports symptoms have improved drastically, at this point she has minimal to no pain, she is able to resume all activities. ROS/Meds/PSH/PMH/FH/SH: I personally reviewed the patients standard intake form. Pertinents are discussed in the HPI    Physical Examination:  General: Awake and alert. HEENT: Normocephalic, atraumatic  CV/Pulm: Breathing even and unlabored  Skin: No obvious rashes noted. Lymphatic: No obvious evidence of lymphedema or lymphadenopathy    Musculoskeletal:   Examination of the right upper extremity demonstrates normal sensation to light touch in the median, ulnar and radial distribution, cap refill < 5 seconds in all fingers, Negative tenderness at the radial styloid with a negative Finkelstein test. No pain or crepitus at the point of the second and first compartment intersection. No pain at the thumb [facility] joint. Imaging / Electrodiagnostic Tests:     none    Assessment:   1. De Quervain's tenosynovitis, right        Plan:  We discussed the diagnosis and different treatment options. We again discussed observation, splinting, cortisone injections and surgical release of the first dorsal extensor compartment. We discussed that de Quervain's tendinitis is a chronic condition regardless of how long the symptoms have been present, this most likely has been progressing for  much longer than the symptoms were evident and it will likely persist for a long time without medical treatment. Furthermore, the vast majority of patients require surgical release at some point despite some short-term benefits of conservative treatment.  After discussing the risks, benefits and

## 2024-01-15 ENCOUNTER — HOSPITAL ENCOUNTER (EMERGENCY)
Age: 59
Discharge: HOME | End: 2024-01-15
Attending: EMERGENCY MEDICINE
Payer: COMMERCIAL

## 2024-01-15 ENCOUNTER — APPOINTMENT (OUTPATIENT)
Dept: GENERAL RADIOLOGY | Age: 59
End: 2024-01-15
Payer: COMMERCIAL

## 2024-01-15 VITALS
OXYGEN SATURATION: 100 % | BODY MASS INDEX: 37.51 KG/M2 | TEMPERATURE: 98.4 F | HEART RATE: 67 BPM | WEIGHT: 239 LBS | DIASTOLIC BLOOD PRESSURE: 87 MMHG | SYSTOLIC BLOOD PRESSURE: 114 MMHG | HEIGHT: 67 IN | RESPIRATION RATE: 10 BRPM

## 2024-01-15 DIAGNOSIS — I49.3 FREQUENT UNIFOCAL PVCS: Primary | ICD-10-CM

## 2024-01-15 LAB
ANION GAP SERPL CALC-SCNC: 11 MMOL/L (ref 2–11)
BASOPHILS # BLD: 0 K/UL (ref 0–0.2)
BASOPHILS NFR BLD: 1 % (ref 0–2)
BUN SERPL-MCNC: 5 MG/DL (ref 6–23)
CALCIUM SERPL-MCNC: 9.4 MG/DL (ref 8.3–10.4)
CHLORIDE SERPL-SCNC: 107 MMOL/L (ref 98–107)
CO2 SERPL-SCNC: 24 MMOL/L (ref 21–32)
CREAT SERPL-MCNC: 0.76 MG/DL (ref 0.6–1)
DIFFERENTIAL METHOD BLD: ABNORMAL
EKG ATRIAL RATE: 81 BPM
EKG DIAGNOSIS: NORMAL
EKG P AXIS: 70 DEGREES
EKG P-R INTERVAL: 159 MS
EKG Q-T INTERVAL: 343 MS
EKG QRS DURATION: 77 MS
EKG QTC CALCULATION (BAZETT): 399 MS
EKG R AXIS: 24 DEGREES
EKG T AXIS: -8 DEGREES
EKG VENTRICULAR RATE: 81 BPM
EOSINOPHIL # BLD: 0.1 K/UL (ref 0–0.8)
EOSINOPHIL NFR BLD: 2 % (ref 0.5–7.8)
ERYTHROCYTE [DISTWIDTH] IN BLOOD BY AUTOMATED COUNT: 13.9 % (ref 11.9–14.6)
GLUCOSE SERPL-MCNC: 99 MG/DL (ref 65–100)
HCT VFR BLD AUTO: 36.3 % (ref 35.8–46.3)
HGB BLD-MCNC: 12.4 G/DL (ref 11.7–15.4)
IMM GRANULOCYTES # BLD AUTO: 0 K/UL (ref 0–0.5)
IMM GRANULOCYTES NFR BLD AUTO: 0 % (ref 0–5)
LYMPHOCYTES # BLD: 1.1 K/UL (ref 0.5–4.6)
LYMPHOCYTES NFR BLD: 28 % (ref 13–44)
MCH RBC QN AUTO: 30.5 PG (ref 26.1–32.9)
MCHC RBC AUTO-ENTMCNC: 34.2 G/DL (ref 31.4–35)
MCV RBC AUTO: 89.2 FL (ref 82–102)
MONOCYTES # BLD: 0.2 K/UL (ref 0.1–1.3)
MONOCYTES NFR BLD: 5 % (ref 4–12)
NEUTS SEG # BLD: 2.7 K/UL (ref 1.7–8.2)
NEUTS SEG NFR BLD: 65 % (ref 43–78)
NRBC # BLD: 0 K/UL (ref 0–0.2)
PLATELET # BLD AUTO: 256 K/UL (ref 150–450)
PMV BLD AUTO: 10.3 FL (ref 9.4–12.3)
POTASSIUM SERPL-SCNC: 3.7 MMOL/L (ref 3.5–5.1)
RBC # BLD AUTO: 4.07 M/UL (ref 4.05–5.2)
SODIUM SERPL-SCNC: 142 MMOL/L (ref 133–143)
TROPONIN T SERPL HS-MCNC: <6 NG/L (ref 0–14)
TSH, 3RD GENERATION: 0.01 UIU/ML (ref 0.58–3.7)
WBC # BLD AUTO: 4.1 K/UL (ref 4.3–11.1)

## 2024-01-15 PROCEDURE — 85025 COMPLETE CBC W/AUTO DIFF WBC: CPT

## 2024-01-15 PROCEDURE — 93010 ELECTROCARDIOGRAM REPORT: CPT | Performed by: INTERNAL MEDICINE

## 2024-01-15 PROCEDURE — 71045 X-RAY EXAM CHEST 1 VIEW: CPT

## 2024-01-15 PROCEDURE — 93005 ELECTROCARDIOGRAM TRACING: CPT | Performed by: EMERGENCY MEDICINE

## 2024-01-15 PROCEDURE — 99285 EMERGENCY DEPT VISIT HI MDM: CPT

## 2024-01-15 PROCEDURE — 84443 ASSAY THYROID STIM HORMONE: CPT

## 2024-01-15 PROCEDURE — 84484 ASSAY OF TROPONIN QUANT: CPT

## 2024-01-15 PROCEDURE — 80048 BASIC METABOLIC PNL TOTAL CA: CPT

## 2024-01-15 RX ORDER — DILTIAZEM HYDROCHLORIDE 120 MG/1
120 CAPSULE, COATED, EXTENDED RELEASE ORAL DAILY
Qty: 90 CAPSULE | Refills: 3 | Status: SHIPPED | OUTPATIENT
Start: 2024-01-15

## 2024-01-15 ASSESSMENT — ENCOUNTER SYMPTOMS
NAUSEA: 0
EYE DISCHARGE: 0
ABDOMINAL PAIN: 0
COUGH: 0
VOMITING: 0
FACIAL SWELLING: 0
SHORTNESS OF BREATH: 0
RHINORRHEA: 0
COLOR CHANGE: 0
EYE REDNESS: 0
BACK PAIN: 0

## 2024-01-15 ASSESSMENT — LIFESTYLE VARIABLES
HOW MANY STANDARD DRINKS CONTAINING ALCOHOL DO YOU HAVE ON A TYPICAL DAY: PATIENT DOES NOT DRINK
HOW OFTEN DO YOU HAVE A DRINK CONTAINING ALCOHOL: NEVER

## 2024-01-15 ASSESSMENT — PAIN - FUNCTIONAL ASSESSMENT: PAIN_FUNCTIONAL_ASSESSMENT: 0-10

## 2024-01-15 ASSESSMENT — PAIN SCALES - GENERAL: PAINLEVEL_OUTOF10: 0

## 2024-01-15 NOTE — ED PROVIDER NOTES
Emergency Department Provider Note       PCP: Kitty Santillan MD   Age: 58 y.o.   Sex: female     DISPOSITION       No diagnosis found.    Medical Decision Making     Complexity of Problems Addressed:  1 or more acute illnesses that pose a threat to life or bodily function.     Data Reviewed and Analyzed:   I independently ordered and reviewed each unique test.  I reviewed external records: ED visit note from an outside group.  I reviewed external records: provider visit note from PCP.  I reviewed external records: previous lab results from outside ED.   The patients assessment required an independent historian:  at bedside.  The reason they were needed is important historical information not provided by the patient.    I independently ordered and interpreted the ED EKG in the absence of a Cardiologist.    Rate: 81  EKG Interpretation: EKG Interpretation: sinus rhythm, no evidence of arrhythmia and non-specific EKG  ST Segments: Normal ST segments - NO STEMI      I independently interpreted the cardiac monitor rhythm strip normal sinus rhythm with frequent PVCs in isolation.  I interpreted the X-rays chest x-ray negative for acute infiltrate or for.    Discussion of management or test interpretation.  58-year-old female presents with palpitations, she is having PVCs which are witnessed on the monitor and reproduce her symptoms.  Electrolytes blood work and TSH look good, will start low-dose Cardizem CD and have her follow-up with PCP    Risk of Complications and/or Morbidity of Patient Management:  Prescription drug management performed.  Patient was discharged risks and benefits of hospitalization were considered.  Shared medical decision making was utilized in creating the patients health plan today.         Is this patient to be included in the SEP-1 core measure due to severe sepsis or septic shock? No Exclusion criteria - the patient is NOT to be included for SEP-1 Core Measure due to:

## 2024-01-15 NOTE — ED TRIAGE NOTES
Reports intermittent palpitations since ary time. Reports elevated BP readings at home (140s systolic) - reports compliance with meds. Also reports intermittent headaches and left neck pain. Denies injury/trauma

## 2024-01-15 NOTE — DISCHARGE INSTRUCTIONS
Try the Cardizem 120 CD daily to see if that slows the palpitations, drink some extra water daily, if you feel too dizzy or too lightheaded, on the new medicine you can stop it.

## 2024-01-15 NOTE — ED NOTES
I have reviewed discharge instructions with the patient.  The patient verbalized understanding.    Patient left ED via Discharge Method: ambulatory to Home with (family, self).    Opportunity for questions and clarification provided.       Patient given 1 scripts.         To continue your aftercare when you leave the hospital, you may receive an automated call from our care team to check in on how you are doing.  This is a free service and part of our promise to provide the best care and service to meet your aftercare needs.” If you have questions, or wish to unsubscribe from this service please call [phone].  Thank you for Choosing our [facility] Emergency Department.

## 2024-02-14 ENCOUNTER — OFFICE VISIT (OUTPATIENT)
Dept: ORTHOPEDIC SURGERY | Age: 59
End: 2024-02-14
Payer: COMMERCIAL

## 2024-02-14 DIAGNOSIS — M65.4 DE QUERVAIN'S TENOSYNOVITIS, RIGHT: Primary | ICD-10-CM

## 2024-02-14 PROCEDURE — 20550 NJX 1 TENDON SHEATH/LIGAMENT: CPT | Performed by: ORTHOPAEDIC SURGERY

## 2024-02-14 PROCEDURE — 99214 OFFICE O/P EST MOD 30 MIN: CPT | Performed by: ORTHOPAEDIC SURGERY

## 2024-02-14 RX ORDER — BETAMETHASONE SODIUM PHOSPHATE AND BETAMETHASONE ACETATE 3; 3 MG/ML; MG/ML
6 INJECTION, SUSPENSION INTRA-ARTICULAR; INTRALESIONAL; INTRAMUSCULAR; SOFT TISSUE ONCE
Status: COMPLETED | OUTPATIENT
Start: 2024-02-14 | End: 2024-02-14

## 2024-02-14 RX ADMIN — BETAMETHASONE SODIUM PHOSPHATE AND BETAMETHASONE ACETATE 6 MG: 3; 3 INJECTION, SUSPENSION INTRA-ARTICULAR; INTRALESIONAL; INTRAMUSCULAR; SOFT TISSUE at 13:57

## 2024-02-14 NOTE — PROGRESS NOTES
Orthopaedic Hand Surgery Note    Name: Sarah Alaniz  Age: 58 y.o.  YOB: 1965  Gender: female  MRN: [id number]    Follow up: DeQuervain's tendinitis.    HPI: Patient is a 58 y.o. female who return for evaluation of  right 1st dorsal compartment tendinitis. Last visit we provided anti-inflammatories given that the injection was provided good relief. Patient reports symptoms have recurred.    ROS/Meds/PSH/PMH/FH/SH: I personally reviewed the patients standard intake form.  Pertinents are discussed in the HPI    Physical Examination:  General: Awake and alert.  HEENT: Normocephalic, atraumatic  CV/Pulm: Breathing even and unlabored  Skin: No obvious rashes noted.  Lymphatic: No obvious evidence of lymphedema or lymphadenopathy    Musculoskeletal:   Examination of the right upper extremity demonstrates normal sensation to light touch in the median, ulnar and radial distribution, cap refill < 5 seconds in all fingers, Positive tenderness at the radial styloid with a positive Finkelstein test. No pain or crepitus at the point of the second and first compartment intersection. No pain at the thumb CMC joint.    Imaging / Electrodiagnostic Tests:     none    Assessment:   1. De Quervain's tenosynovitis, right        Plan:  We discussed the diagnosis and different treatment options. We again discussed observation, splinting, cortisone injections and surgical release of the first dorsal extensor compartment.  We discussed that de Quervain's tendinitis is a chronic condition regardless of how long the symptoms have been present, this most likely has been progressing for  much longer than the symptoms were evident and it will likely persist for a long time without medical treatment.  Furthermore, the vast majority of patients require surgical release at some point despite some short-term benefits of conservative treatment. After discussing the risks, benefits and alternatives of all treatment options in

## 2024-02-15 ENCOUNTER — OFFICE VISIT (OUTPATIENT)
Dept: ORTHOPEDIC SURGERY | Age: 59
End: 2024-02-15

## 2024-02-15 DIAGNOSIS — M17.12 PRIMARY OSTEOARTHRITIS OF LEFT KNEE: ICD-10-CM

## 2024-02-15 DIAGNOSIS — M17.11 PRIMARY OSTEOARTHRITIS OF RIGHT KNEE: Primary | ICD-10-CM

## 2024-02-15 NOTE — PROGRESS NOTES
Name: Sarah Alaniz  YOB: 1965  Gender: female  MRN: [id number]    Allergies   Allergen Reactions    Sulfa Antibiotics Hives and Rash    Adhesive Tape Other (See Comments)     Itching, rash    Lisinopril Other (See Comments)       CC:  bilateral knee pain, Osteoarthritis    PROCEDURE:  HA injection(s). All questions answered.     Procedure Note: The patient was placed in upright position with both knees hanging freely from exam table.  Both knees were prepped in sterile fashion using alcohol wipe(s).  Using an infrapatellar approach, 3cc of Durolane was injected freely.  The needle was then removed, pressure hemostatis achieved, injection site was cleansed with alcohol wipe and dressed with band aid.    The patient tolerated the procedure without complication.  The patient  will follow up as scheduled.    KYLER Tavera  02/15/24

## 2024-02-21 DIAGNOSIS — M17.11 PRIMARY OSTEOARTHRITIS OF RIGHT KNEE: Primary | ICD-10-CM

## 2024-02-21 DIAGNOSIS — M65.4 DE QUERVAIN'S TENOSYNOVITIS, RIGHT: ICD-10-CM

## 2024-02-21 RX ORDER — MELOXICAM 15 MG/1
15 TABLET ORAL DAILY
Qty: 30 TABLET | Refills: 1 | Status: SHIPPED | OUTPATIENT
Start: 2024-02-21 | End: 2024-04-21

## 2024-02-22 DIAGNOSIS — I10 ESSENTIAL HYPERTENSION: ICD-10-CM

## 2024-02-22 DIAGNOSIS — F41.9 ANXIETY: ICD-10-CM

## 2024-02-22 RX ORDER — BUPROPION HYDROCHLORIDE 150 MG/1
150 TABLET ORAL DAILY
Qty: 90 TABLET | Refills: 1 | Status: CANCELLED | OUTPATIENT
Start: 2024-02-22

## 2024-02-22 RX ORDER — VALSARTAN AND HYDROCHLOROTHIAZIDE 80; 12.5 MG/1; MG/1
1 TABLET, FILM COATED ORAL DAILY
Qty: 90 TABLET | Refills: 1 | Status: CANCELLED | OUTPATIENT
Start: 2024-02-22

## 2024-02-27 ENCOUNTER — TELEMEDICINE (OUTPATIENT)
Dept: FAMILY MEDICINE CLINIC | Facility: CLINIC | Age: 59
End: 2024-02-27
Payer: COMMERCIAL

## 2024-02-27 DIAGNOSIS — I10 ESSENTIAL HYPERTENSION: ICD-10-CM

## 2024-02-27 DIAGNOSIS — F41.9 ANXIETY: ICD-10-CM

## 2024-02-27 PROCEDURE — 99214 OFFICE O/P EST MOD 30 MIN: CPT | Performed by: FAMILY MEDICINE

## 2024-02-27 RX ORDER — BUPROPION HYDROCHLORIDE 300 MG/1
300 TABLET ORAL DAILY
Qty: 90 TABLET | Refills: 1 | Status: SHIPPED | OUTPATIENT
Start: 2024-02-27

## 2024-02-27 RX ORDER — VALSARTAN AND HYDROCHLOROTHIAZIDE 80; 12.5 MG/1; MG/1
1 TABLET, FILM COATED ORAL DAILY
Qty: 90 TABLET | Refills: 1 | Status: SHIPPED | OUTPATIENT
Start: 2024-02-27

## 2024-02-27 SDOH — ECONOMIC STABILITY: FOOD INSECURITY: WITHIN THE PAST 12 MONTHS, THE FOOD YOU BOUGHT JUST DIDN'T LAST AND YOU DIDN'T HAVE MONEY TO GET MORE.: NEVER TRUE

## 2024-02-27 SDOH — ECONOMIC STABILITY: INCOME INSECURITY: HOW HARD IS IT FOR YOU TO PAY FOR THE VERY BASICS LIKE FOOD, HOUSING, MEDICAL CARE, AND HEATING?: NOT HARD AT ALL

## 2024-02-27 SDOH — ECONOMIC STABILITY: FOOD INSECURITY: WITHIN THE PAST 12 MONTHS, YOU WORRIED THAT YOUR FOOD WOULD RUN OUT BEFORE YOU GOT MONEY TO BUY MORE.: NEVER TRUE

## 2024-02-27 ASSESSMENT — PATIENT HEALTH QUESTIONNAIRE - PHQ9
SUM OF ALL RESPONSES TO PHQ QUESTIONS 1-9: 0
1. LITTLE INTEREST OR PLEASURE IN DOING THINGS: 0
SUM OF ALL RESPONSES TO PHQ QUESTIONS 1-9: 0
SUM OF ALL RESPONSES TO PHQ QUESTIONS 1-9: 0
2. FEELING DOWN, DEPRESSED OR HOPELESS: 0
SUM OF ALL RESPONSES TO PHQ QUESTIONS 1-9: 0
SUM OF ALL RESPONSES TO PHQ9 QUESTIONS 1 & 2: 0

## 2024-02-27 ASSESSMENT — ENCOUNTER SYMPTOMS
COUGH: 0
VOMITING: 0
SHORTNESS OF BREATH: 0
CONSTIPATION: 0
DIARRHEA: 0

## 2024-02-27 NOTE — PROGRESS NOTES
readout  Patient-Reported Weight: 240       Sarah Alaniz, was evaluated through a synchronous (real-time) audio-video encounter. The patient (or guardian if applicable) is aware that this is a billable service, which includes applicable co-pays. This Virtual Visit was conducted with patient's (and/or legal guardian's) consent. Patient identification was verified, and a caregiver was present when appropriate.   The patient was located at Home: [address] [place] [id number]  Provider was located at Home (Appt Dept State): [place]    --Kitty Stevens MD

## 2024-04-02 ENCOUNTER — PATIENT MESSAGE (OUTPATIENT)
Dept: FAMILY MEDICINE CLINIC | Facility: CLINIC | Age: 59
End: 2024-04-02

## 2024-04-03 RX ORDER — HYDROXYZINE PAMOATE 25 MG/1
25 CAPSULE ORAL 3 TIMES DAILY PRN
Qty: 30 CAPSULE | Refills: 0 | Status: SHIPPED | OUTPATIENT
Start: 2024-04-03 | End: 2024-05-03

## 2024-04-03 NOTE — TELEPHONE ENCOUNTER
From: Sarah Alaniz  To: Dr. Kitty Stevens  Sent: 4/2/2024 7:33 PM EDT  Subject: Medicine Refill    Hi Dr. Stevens, can you please send in a prescription for Hydroxyzine.. you prescribe it for me last August 2023 to take as needed for anxiety.     Thank you

## 2024-06-10 ENCOUNTER — HOSPITAL ENCOUNTER (OUTPATIENT)
Dept: PHYSICAL THERAPY | Age: 59
Setting detail: RECURRING SERIES
Discharge: HOME | End: 2024-06-13
Payer: COMMERCIAL

## 2024-06-10 DIAGNOSIS — I89.0 LYMPHEDEMA, NOT ELSEWHERE CLASSIFIED: Primary | ICD-10-CM

## 2024-06-10 PROCEDURE — 97140 MANUAL THERAPY 1/> REGIONS: CPT

## 2024-06-10 PROCEDURE — 97161 PT EVAL LOW COMPLEX 20 MIN: CPT

## 2024-06-10 ASSESSMENT — PAIN SCALES - GENERAL: PAINLEVEL_OUTOF10: 0

## 2024-06-10 NOTE — THERAPY EVALUATION
Sarah Alaniz  : 1965  Primary: [employer] ([employer])  Secondary:  [place] [address]  [address]  [facility] [address]  Phone: [phone]  Fax: [phone] Plan Frequency: 1x/week for 90 days    Plan of Care/Certification Expiration Date: 24        Plan of Care/Certification Expiration Date:  Plan of Care/Certification Expiration Date: 24    Frequency/Duration: Plan Frequency: 1x/week for 90 days      Time In/Out:   Time In: 1358  Time Out: 1445      PT Visit Info:         Visit Count:  1                OUTPATIENT PHYSICAL THERAPY:             Initial Assessment 6/10/2024               Episode (oncology rehab)         Treatment Diagnosis:      Lymphedema, not elsewhere classified  Medical/Referring Diagnosis:    Malignant neoplasm of upper-inner quadrant of left female breast [C50.212]  Estrogen receptor positive status (ER+) [Z17.0]      Referring Physician:  Froilan Liu MD MD Orders:  PT Eval and Treat oncology rehab  Return MD Appt:  unknown  Date of Onset:  Onset Date: 19      Allergies:  Sulfa antibiotics, Adhesive tape, and Lisinopril  Restrictions/Precautions:    lymphedema      Medications Last Reviewed:  6/10/2024     SUBJECTIVE   History of Injury/Illness (Reason for Referral):  The patient presents following diagnosis and treatment of left breast cancer.  She underwent a biopsy 19 followed by a lymph node biopsy 19.  She underwent a lumpectomy with AND (22 nodes) 19.  She did not have chemo or radiation.  She has lymphedema and is interested in garment and pump recommendations.  Patient Stated Goal(s):  \"to reduce the lymphedema\"  Initial Pain Level:      0/10   Post Session Pain Level:     0/10  Past Medical History/Comorbidities:   Ms. Alaniz  has a past medical history of Anxiety, COVID-19 vaccine series completed, Fibromyalgia, GERD (gastroesophageal reflux disease), History of breast cancer, HTN (hypertension), Refusal of

## 2024-06-10 NOTE — PROGRESS NOTES
Sarah Alaniz  : 1965  Primary: [employer] Long ([employer])  Secondary:  [place] [address]  [address]  [facility] [address]  Phone: [phone]  Fax: [phone] Plan Frequency: 1x/week for 90 days  Plan of Care/Certification Expiration Date: 24        Plan of Care/Certification Expiration Date:  Plan of Care/Certification Expiration Date: 24    Frequency/Duration: Plan Frequency: 1x/week for 90 days      Time In/Out:   Time In: 1358  Time Out: 1445      PT Visit Info:         Visit Count:  1    OUTPATIENT PHYSICAL THERAPY:   Treatment Note 6/10/2024       Episode  (oncology rehab)               Treatment Diagnosis:     Lymphedema, not elsewhere classified  Medical/Referring Diagnosis:    Malignant neoplasm of upper-inner quadrant of left female breast [C50.212]  Estrogen receptor positive status (ER+) [Z17.0]      Referring Physician:  Froilan Liu MD MD Orders:  PT Eval and Treat oncology rehab  Return MD Appt:  unknown   Date of Onset:  Onset Date: 19      Allergies:   Sulfa antibiotics, Adhesive tape, and Lisinopril  Restrictions/Precautions:   lymphedema      Interventions Planned (Treatment may consist of any combination of the following):     See Assessment Note    Subjective Comments:   The patient reports she would like to have a compression pump.  Initial Pain Level::     0/10  Post Session Pain Level:       0/10  Medications Last Reviewed:  6/10/2024  Updated Objective Findings:  See Evaluation Note from today  Treatment   MANUAL THERAPY: (30 minutes):   Complex decongestive physiotherapy was utilized and necessary because of the patient's  edema .    Date:    Date:   Date:     Activity/Exercise Parameters Parameters Parameters                                                 Girth and ROM assessed.  A prescription for a day and night garment was requested by the patient through her Mychart.  She will contact  Prosthetics for an appointment to be

## 2024-06-12 ENCOUNTER — TELEPHONE (OUTPATIENT)
Dept: ORTHOPEDIC SURGERY | Age: 59
End: 2024-06-12

## 2024-06-18 ENCOUNTER — OFFICE VISIT (OUTPATIENT)
Age: 59
End: 2024-06-18
Payer: COMMERCIAL

## 2024-06-18 DIAGNOSIS — M65.4 DE QUERVAIN'S TENOSYNOVITIS, RIGHT: Primary | ICD-10-CM

## 2024-06-18 PROCEDURE — 99214 OFFICE O/P EST MOD 30 MIN: CPT | Performed by: ORTHOPAEDIC SURGERY

## 2024-06-18 NOTE — PROGRESS NOTES
point despite some short-term benefits of conservative treatment. After discussing the risks, benefits and alternatives of all treatment options in detail, the patient elects for right de Quervain's release, the patient has had 2 injections and symptoms keep recurring, she would like to have a permanent fix to her problem.    Patient understands risks and benefits of RIGHT DE QUERVAIN'S RELEASE including but not limited to nerve injury, vessel injury, infection, failure to achieve desired results and possible need for additional surgery. Patient understands and wishes to proceed with surgery.     On Exam:   The patient is alert and oriented; ;   Lung auscultation is clear bilaterally   Heart has RRR without murmurs         Patient voiced accordance and understanding of the information provided and the formulated plan. All questions were answered to the patient's satisfaction during the encounter.    Azucena Bernal MD  Orthopaedic Surgery  06/18/24  2:20 PM

## 2024-06-18 NOTE — H&P (VIEW-ONLY)
Orthopaedic Hand Surgery Note    Name: Sarah Alaniz  Age: 58 y.o.  YOB: 1965  Gender: female  MRN: [id number]    Follow up: DeQuervain's tendinitis.    HPI: Patient is a 58 y.o. female who return for evaluation of  right 1st dorsal compartment tendinitis. Last visit we provided steroid injection. Patient reports symptoms have recurred, patient reports that each injection has provided relief for about 2 months and then symptoms started to recur, she wants to have surgery, she did well with left de Quervain's release 2 years ago.    ROS/Meds/PSH/PMH/FH/SH: I personally reviewed the patients standard intake form.  Pertinents are discussed in the HPI    Physical Examination:  General: Awake and alert.  HEENT: Normocephalic, atraumatic  CV/Pulm: Breathing even and unlabored  Skin: No obvious rashes noted.  Lymphatic: No obvious evidence of lymphedema or lymphadenopathy    Musculoskeletal:   Examination of the right upper extremity demonstrates normal sensation to light touch in the median, ulnar and radial distribution, cap refill < 5 seconds in all fingers, Positive tenderness at the radial styloid with a positive Finkelstein test. No pain or crepitus at the point of the second and first compartment intersection. No pain at the thumb CMC joint.    Imaging / Electrodiagnostic Tests:     none    Assessment:   1. De Quervain's tenosynovitis, right        Plan:  We discussed the diagnosis and different treatment options. We again discussed observation, splinting, cortisone injections and surgical release of the first dorsal extensor compartment.  We discussed that de Quervain's tendinitis is a chronic condition regardless of how long the symptoms have been present, this most likely has been progressing for  much longer than the symptoms were evident and it will likely persist for a long time without medical treatment.  Furthermore, the vast majority of patients require surgical release at some

## 2024-06-20 DIAGNOSIS — M65.4 DE QUERVAIN'S TENOSYNOVITIS, RIGHT: Primary | ICD-10-CM

## 2024-06-27 ENCOUNTER — OFFICE VISIT (OUTPATIENT)
Dept: ORTHOPEDIC SURGERY | Age: 59
End: 2024-06-27

## 2024-06-27 DIAGNOSIS — M17.11 PRIMARY OSTEOARTHRITIS OF RIGHT KNEE: Primary | ICD-10-CM

## 2024-06-27 DIAGNOSIS — M17.12 PRIMARY OSTEOARTHRITIS OF LEFT KNEE: ICD-10-CM

## 2024-07-01 NOTE — PERIOP NOTE
Patient verified name and .  Order for consent NOT found in EHR at time of PAT visit. Unable to verify case posting against order; surgery verified by patient.    Type 1B surgery, PAT phone assessment complete.  Orders not received.  Labs per surgeon: unknown; no orders received    Labs per anesthesia protocol: POC K+ DOS    Patient answered medical/surgical history questions at their best of ability. All prior to admission medications documented in EPIC.  Patient reports that she is a [employer] and will not accept blood products. She denies any history of anemia. Last hgb in chart was 12.4 on 1/15/24. Chart sent for anesthesia review and flagged for charge nurse.    Patient instructed to continue taking all prescription medications up to the day of surgery but to take only the following medications the day of surgery according to anesthesia guidelines with a small sip of water: Wellbutrin, Nexium, letrozole       Patient informed that all vitamins and supplements should be held 7 days prior to surgery and NSAIDS 5 days prior to surgery. Prescription meds to hold:vitamins and supplements 7 days, Voltaren gel 5 days    Patient instructed on the following:    > Arrive at OPC Entrance, time of arrival to be called the day before by 1700  > NPO after midnight, unless otherwise indicated, including gum, mints, and ice chips  > Responsible adult must drive patient to the hospital, stay during surgery, and patient will need supervision 24 hours after anesthesia  > Use non moisturizing soap in shower the night before surgery and on the morning of surgery  > All piercings must be removed prior to arrival.    > Leave all valuables (money and jewelry) at home but bring insurance card and ID on DOS.   > You may be required to pay a deductible or co-pay on the day of your procedure. You can pre-pay by calling [phone] if your surgery is at the [facility] or [phone] if your surgery is at the [facility].  >

## 2024-07-05 NOTE — PERIOP NOTE
Preop department called to notify patient of arrival time for scheduled procedure. Instructions given to   - Arrive at OPC Entrance [address].  - Remain NPO after midnight, unless otherwise indicated, including gum, mints, and ice chips.   - Have a responsible adult to drive patient to the hospital, stay during surgery, and patient will need supervision 24 hours after anesthesia.   - Use antibacterial soap in shower the night before surgery and on the morning of surgery.       Was patient contacted: yes  Voicemail left:   Numbers contacted: [phone]   Arrival time: 0600

## 2024-07-05 NOTE — PERIOP NOTE
Preop department called to notify patient of arrival time for scheduled procedure. Instructions given to   - Arrive at OPC Entrance [address].  - Remain NPO after midnight, unless otherwise indicated, including gum, mints, and ice chips.   - Have a responsible adult to drive patient to the hospital, stay during surgery, and patient will need supervision 24 hours after anesthesia.   - Use antibacterial soap in shower the night before surgery and on the morning of surgery.       Was patient contacted: no  Voicemail left: yes  Numbers contacted: [phone]   Arrival time: 0600

## 2024-07-07 ENCOUNTER — ANESTHESIA EVENT (OUTPATIENT)
Dept: SURGERY | Age: 59
End: 2024-07-07
Payer: COMMERCIAL

## 2024-07-07 DIAGNOSIS — M65.4 DE QUERVAIN'S TENOSYNOVITIS, RIGHT: Primary | ICD-10-CM

## 2024-07-08 ENCOUNTER — ANESTHESIA (OUTPATIENT)
Dept: SURGERY | Age: 59
End: 2024-07-08
Payer: COMMERCIAL

## 2024-07-08 ENCOUNTER — HOSPITAL ENCOUNTER (OUTPATIENT)
Age: 59
Setting detail: OUTPATIENT SURGERY
Discharge: HOME | End: 2024-07-08
Attending: ORTHOPAEDIC SURGERY | Admitting: ORTHOPAEDIC SURGERY
Payer: COMMERCIAL

## 2024-07-08 VITALS
DIASTOLIC BLOOD PRESSURE: 76 MMHG | SYSTOLIC BLOOD PRESSURE: 119 MMHG | HEART RATE: 50 BPM | RESPIRATION RATE: 16 BRPM | BODY MASS INDEX: 36.1 KG/M2 | TEMPERATURE: 97.7 F | WEIGHT: 230 LBS | OXYGEN SATURATION: 100 % | HEIGHT: 67 IN

## 2024-07-08 PROCEDURE — 2580000003 HC RX 258: Performed by: ANESTHESIOLOGY

## 2024-07-08 PROCEDURE — 3600000013 HC SURGERY LEVEL 3 ADDTL 15MIN: Performed by: ORTHOPAEDIC SURGERY

## 2024-07-08 PROCEDURE — 3600000003 HC SURGERY LEVEL 3 BASE: Performed by: ORTHOPAEDIC SURGERY

## 2024-07-08 PROCEDURE — 6360000002 HC RX W HCPCS: Performed by: ORTHOPAEDIC SURGERY

## 2024-07-08 PROCEDURE — 2500000003 HC RX 250 WO HCPCS: Performed by: REGISTERED NURSE

## 2024-07-08 PROCEDURE — 3700000000 HC ANESTHESIA ATTENDED CARE: Performed by: ORTHOPAEDIC SURGERY

## 2024-07-08 PROCEDURE — 6370000000 HC RX 637 (ALT 250 FOR IP): Performed by: ANESTHESIOLOGY

## 2024-07-08 PROCEDURE — 6360000002 HC RX W HCPCS: Performed by: NURSE PRACTITIONER

## 2024-07-08 PROCEDURE — 7100000010 HC PHASE II RECOVERY - FIRST 15 MIN: Performed by: ORTHOPAEDIC SURGERY

## 2024-07-08 PROCEDURE — 2709999900 HC NON-CHARGEABLE SUPPLY: Performed by: ORTHOPAEDIC SURGERY

## 2024-07-08 PROCEDURE — 3700000001 HC ADD 15 MINUTES (ANESTHESIA): Performed by: ORTHOPAEDIC SURGERY

## 2024-07-08 PROCEDURE — 6360000002 HC RX W HCPCS: Performed by: REGISTERED NURSE

## 2024-07-08 PROCEDURE — 7100000001 HC PACU RECOVERY - ADDTL 15 MIN: Performed by: ORTHOPAEDIC SURGERY

## 2024-07-08 PROCEDURE — 7100000000 HC PACU RECOVERY - FIRST 15 MIN: Performed by: ORTHOPAEDIC SURGERY

## 2024-07-08 PROCEDURE — 6360000002 HC RX W HCPCS: Performed by: ANESTHESIOLOGY

## 2024-07-08 PROCEDURE — 2500000003 HC RX 250 WO HCPCS: Performed by: ORTHOPAEDIC SURGERY

## 2024-07-08 RX ORDER — SODIUM CHLORIDE 0.9 % (FLUSH) 0.9 %
5-40 SYRINGE (ML) INJECTION PRN
Status: DISCONTINUED | OUTPATIENT
Start: 2024-07-08 | End: 2024-07-08 | Stop reason: HOSPADM

## 2024-07-08 RX ORDER — SODIUM CHLORIDE 0.9 % (FLUSH) 0.9 %
5-40 SYRINGE (ML) INJECTION EVERY 12 HOURS SCHEDULED
Status: DISCONTINUED | OUTPATIENT
Start: 2024-07-08 | End: 2024-07-08 | Stop reason: HOSPADM

## 2024-07-08 RX ORDER — SODIUM CHLORIDE 9 MG/ML
INJECTION, SOLUTION INTRAVENOUS PRN
Status: DISCONTINUED | OUTPATIENT
Start: 2024-07-08 | End: 2024-07-08 | Stop reason: HOSPADM

## 2024-07-08 RX ORDER — ACETAMINOPHEN 500 MG
1000 TABLET ORAL ONCE
Status: COMPLETED | OUTPATIENT
Start: 2024-07-08 | End: 2024-07-08

## 2024-07-08 RX ORDER — OXYCODONE HYDROCHLORIDE 5 MG/1
5 TABLET ORAL
Status: COMPLETED | OUTPATIENT
Start: 2024-07-08 | End: 2024-07-08

## 2024-07-08 RX ORDER — MIDAZOLAM HYDROCHLORIDE 1 MG/ML
INJECTION INTRAMUSCULAR; INTRAVENOUS PRN
Status: DISCONTINUED | OUTPATIENT
Start: 2024-07-08 | End: 2024-07-08 | Stop reason: SDUPTHER

## 2024-07-08 RX ORDER — LIDOCAINE HYDROCHLORIDE 10 MG/ML
INJECTION, SOLUTION INFILTRATION; PERINEURAL PRN
Status: DISCONTINUED | OUTPATIENT
Start: 2024-07-08 | End: 2024-07-08 | Stop reason: ALTCHOICE

## 2024-07-08 RX ORDER — HYDROMORPHONE HYDROCHLORIDE 2 MG/ML
0.25 INJECTION, SOLUTION INTRAMUSCULAR; INTRAVENOUS; SUBCUTANEOUS EVERY 5 MIN PRN
Status: DISCONTINUED | OUTPATIENT
Start: 2024-07-08 | End: 2024-07-08 | Stop reason: HOSPADM

## 2024-07-08 RX ORDER — PROPOFOL 10 MG/ML
INJECTION, EMULSION INTRAVENOUS PRN
Status: DISCONTINUED | OUTPATIENT
Start: 2024-07-08 | End: 2024-07-08 | Stop reason: SDUPTHER

## 2024-07-08 RX ORDER — LIDOCAINE HYDROCHLORIDE 20 MG/ML
INJECTION, SOLUTION EPIDURAL; INFILTRATION; INTRACAUDAL; PERINEURAL PRN
Status: DISCONTINUED | OUTPATIENT
Start: 2024-07-08 | End: 2024-07-08 | Stop reason: SDUPTHER

## 2024-07-08 RX ORDER — TRAMADOL HYDROCHLORIDE 50 MG/1
50 TABLET ORAL EVERY 6 HOURS PRN
Qty: 20 TABLET | Refills: 0 | Status: SHIPPED | OUTPATIENT
Start: 2024-07-08 | End: 2024-07-13

## 2024-07-08 RX ORDER — LIDOCAINE HYDROCHLORIDE 10 MG/ML
1 INJECTION, SOLUTION INFILTRATION; PERINEURAL
Status: DISCONTINUED | OUTPATIENT
Start: 2024-07-08 | End: 2024-07-08 | Stop reason: HOSPADM

## 2024-07-08 RX ORDER — SODIUM CHLORIDE, SODIUM LACTATE, POTASSIUM CHLORIDE, CALCIUM CHLORIDE 600; 310; 30; 20 MG/100ML; MG/100ML; MG/100ML; MG/100ML
INJECTION, SOLUTION INTRAVENOUS CONTINUOUS
Status: DISCONTINUED | OUTPATIENT
Start: 2024-07-08 | End: 2024-07-08 | Stop reason: HOSPADM

## 2024-07-08 RX ORDER — ONDANSETRON 2 MG/ML
4 INJECTION INTRAMUSCULAR; INTRAVENOUS
Status: DISCONTINUED | OUTPATIENT
Start: 2024-07-08 | End: 2024-07-08 | Stop reason: HOSPADM

## 2024-07-08 RX ORDER — FENTANYL CITRATE 50 UG/ML
100 INJECTION, SOLUTION INTRAMUSCULAR; INTRAVENOUS
Status: DISCONTINUED | OUTPATIENT
Start: 2024-07-08 | End: 2024-07-08 | Stop reason: HOSPADM

## 2024-07-08 RX ORDER — MIDAZOLAM HYDROCHLORIDE 2 MG/2ML
2 INJECTION, SOLUTION INTRAMUSCULAR; INTRAVENOUS
Status: DISCONTINUED | OUTPATIENT
Start: 2024-07-08 | End: 2024-07-08 | Stop reason: HOSPADM

## 2024-07-08 RX ORDER — BUPIVACAINE HYDROCHLORIDE 2.5 MG/ML
INJECTION, SOLUTION EPIDURAL; INFILTRATION; INTRACAUDAL PRN
Status: DISCONTINUED | OUTPATIENT
Start: 2024-07-08 | End: 2024-07-08 | Stop reason: ALTCHOICE

## 2024-07-08 RX ORDER — DEXTROSE MONOHYDRATE 100 MG/ML
INJECTION, SOLUTION INTRAVENOUS CONTINUOUS PRN
Status: DISCONTINUED | OUTPATIENT
Start: 2024-07-08 | End: 2024-07-08 | Stop reason: HOSPADM

## 2024-07-08 RX ORDER — HALOPERIDOL 5 MG/ML
1 INJECTION INTRAMUSCULAR
Status: DISCONTINUED | OUTPATIENT
Start: 2024-07-08 | End: 2024-07-08 | Stop reason: HOSPADM

## 2024-07-08 RX ORDER — IBUPROFEN 600 MG/1
1 TABLET ORAL PRN
Status: DISCONTINUED | OUTPATIENT
Start: 2024-07-08 | End: 2024-07-08 | Stop reason: HOSPADM

## 2024-07-08 RX ORDER — MELOXICAM 15 MG/1
15 TABLET ORAL DAILY
Qty: 21 TABLET | Refills: 0 | Status: SHIPPED | OUTPATIENT
Start: 2024-07-08 | End: 2024-07-29

## 2024-07-08 RX ORDER — NALOXONE HYDROCHLORIDE 0.4 MG/ML
INJECTION, SOLUTION INTRAMUSCULAR; INTRAVENOUS; SUBCUTANEOUS PRN
Status: DISCONTINUED | OUTPATIENT
Start: 2024-07-08 | End: 2024-07-08 | Stop reason: HOSPADM

## 2024-07-08 RX ADMIN — MIDAZOLAM 2 MG: 1 INJECTION INTRAMUSCULAR; INTRAVENOUS at 07:39

## 2024-07-08 RX ADMIN — PROPOFOL 50 MG: 10 INJECTION, EMULSION INTRAVENOUS at 07:42

## 2024-07-08 RX ADMIN — SODIUM CHLORIDE, POTASSIUM CHLORIDE, SODIUM LACTATE AND CALCIUM CHLORIDE: 600; 310; 30; 20 INJECTION, SOLUTION INTRAVENOUS at 06:49

## 2024-07-08 RX ADMIN — HYDROMORPHONE HYDROCHLORIDE 0.25 MG: 2 INJECTION INTRAMUSCULAR; INTRAVENOUS; SUBCUTANEOUS at 08:25

## 2024-07-08 RX ADMIN — PROPOFOL 200 MCG/KG/MIN: 10 INJECTION, EMULSION INTRAVENOUS at 07:43

## 2024-07-08 RX ADMIN — LIDOCAINE HYDROCHLORIDE 40 MG: 20 INJECTION, SOLUTION EPIDURAL; INFILTRATION; INTRACAUDAL; PERINEURAL at 07:42

## 2024-07-08 RX ADMIN — OXYCODONE HYDROCHLORIDE 5 MG: 5 TABLET ORAL at 08:40

## 2024-07-08 RX ADMIN — ACETAMINOPHEN 1000 MG: 500 TABLET, FILM COATED ORAL at 06:35

## 2024-07-08 RX ADMIN — Medication 2000 MG: at 07:40

## 2024-07-08 ASSESSMENT — PAIN SCALES - GENERAL
PAINLEVEL_OUTOF10: 3
PAINLEVEL_OUTOF10: 3
PAINLEVEL_OUTOF10: 6
PAINLEVEL_OUTOF10: 4
PAINLEVEL_OUTOF10: 8

## 2024-07-08 ASSESSMENT — PAIN DESCRIPTION - ORIENTATION: ORIENTATION: RIGHT

## 2024-07-08 ASSESSMENT — PAIN DESCRIPTION - LOCATION: LOCATION: WRIST

## 2024-07-08 NOTE — ANESTHESIA POSTPROCEDURE EVALUATION
Department of Anesthesiology  Postprocedure Note    Patient: Sarah Alaniz  MRN: [id number]  YOB: 1965  Date of evaluation: 7/8/2024    Procedure Summary       Date: 07/08/24 Room / Location: [facility] OP OR  / [facility] OPC    Anesthesia Start: 0739 Anesthesia Stop: 0803    Procedure: WRIST DEQUERVAINS release on the right (Right: Elbow) Diagnosis:       De Quervain's tenosynovitis, right      (De Quervain's tenosynovitis, right [M65.4])    Surgeons: Azucena Bernal MD Responsible Provider: Alida Hammond MD    Anesthesia Type: TIVA ASA Status: 2            Anesthesia Type: TIVA    Mary Ellen Phase I: Mary Ellen Score: 8    Mary Ellen Phase II: Mary Ellen Score: 10    Anesthesia Post Evaluation    Patient location during evaluation: PACU  Patient participation: complete - patient participated  Level of consciousness: awake and alert  Airway patency: patent  Nausea: well controlled.  Cardiovascular status: acceptable.  Respiratory status: acceptable  Hydration status: stable  Pain management: adequate    No notable events documented.

## 2024-07-08 NOTE — OP NOTE
Hand Surgery Operative Note      Sarah Alaniz   58 y.o.   female   7/8/2024     Pre-op diagnosis: Right DeQuervain Tenosynovitis   Post op diagnosis: same      Procedure: Right First Dorsal Extensor Compartment Release     Surgeon: Azucena Bernal Jr, MD, PhD      Anesthesia: Local + MAC     Tourniquet time:   Total Tourniquet Time Documented:  Arm  (Right) - 9 minutes  Total: Arm  (Right) - 9 minutes        Procedure indications: Patient with recalcitrant radial sided pain consistent with DeQuervain stenosing synovitis. Conservative treatment including HEP, NSAIDs, braces and steroid injections have been tried and failed to provide long-lasting relief of pain. After Thorough discussion, the patient decided to proceed with surgical management. We discussed in detail surgical risks including scar, pain, bleeding, infection, anesthetic risks, neurovascular injury, need for further surgery,  weakness, stiffness, risk of death and potential risk of other unforseen complication.      Procedure description:      Description of procedure: Patient was placed in the supine position and after appropriate time-out and side, site and procedure confirmed. Local anesthesia was infiltrated with Lidocaine 2% plain and 0.25% Bupivacaine plain.    A longitudinal incision was made over the first dorsal extensor compartment, blunt dissection was use, the dorsal radial sensory nerve was identified and protected, the 1st dorsal compartment was identified and release of the EPB and APL tendon sheath retinacular tissue was done on the dorsal third of the compartment to avoid volar dislocation of the tendons afterwards. Tendon integrity was stable without fraying or subluxation post sheath release.  Wound irrigated and closure in routine fashion with 4-0 prolene suture and steristrips. Sterile dressing was applied     Disposition: To PACU with no complications and follow up per routine.  Patient is instructed to remove dressings

## 2024-07-08 NOTE — INTERVAL H&P NOTE
H&P Update:  Sarah Alaniz was seen and examined.  History and physical has been reviewed. The patient has been examined. There have been no significant clinical changes since the completion of the originally dated History and Physical.    Azucena Bernal MD  Orthopaedic Surgery  07/08/24  6:47 AM

## 2024-07-08 NOTE — ANESTHESIA PRE PROCEDURE
Department of Anesthesiology  Preprocedure Note       Name:  Sarah Alaniz   Age:  58 y.o.  :  1965                                          MRN:  [id number]         Date:  2024      Surgeon: Surgeon(s):  Azucena Bernal MD    Procedure: Procedure(s):  WRIST DEQUERVAINS release on the right    Medications prior to admission:   Prior to Admission medications    Medication Sig Start Date End Date Taking? Authorizing Provider   Omega-3 Fatty Acids (FISH OIL PO) Take by mouth daily   Yes Elva Boland MD   MAGNESIUM PO Take by mouth daily   Yes Elva Boland MD   buPROPion (WELLBUTRIN XL) 300 MG extended release tablet Take 1 tablet by mouth daily  Patient taking differently: Take 150 mg by mouth daily 24   Kitty Santillan MD   valsartan-hydroCHLOROthiazide (DIOVAN-HCT) 80-12.5 MG per tablet Take 1 tablet by mouth daily 24   Kitty Santillan MD   esomeprazole (NEXIUM) 40 MG delayed release capsule TAKE 1 CAPSULE BY MOUTH DAILY 30 MIN BEFORE 1ST MEAL 23   Elva Boland MD   diclofenac sodium (VOLTAREN) 1 % GEL Apply 4 g topically 4 times daily as needed for Pain 23   Antonia Austin PA   letrozole (FEMARA) 2.5 MG tablet TAKE 1 TABLET (2.5 MG TOTAL) BY MOUTH ONCE DAILY TAKE WITH OR WITHOUT FOOD. 6/15/22   Elva Boland MD       Current medications:    Current Facility-Administered Medications   Medication Dose Route Frequency Provider Last Rate Last Admin   • lidocaine 1 % injection 1 mL  1 mL IntraDERmal Once PRN Alida Hammond MD       • fentaNYL (SUBLIMAZE) injection 100 mcg  100 mcg IntraVENous Once PRN Alida Hammond MD       • lactated ringers IV soln infusion   IntraVENous Continuous Alida Hammond  mL/hr at 24 0649 New Bag at 24 0649   • sodium chloride flush 0.9 % injection 5-40 mL  5-40 mL IntraVENous 2 times per day Alida Hammond MD       • sodium chloride flush 0.9 % injection 5-40 mL

## 2024-07-17 ENCOUNTER — OFFICE VISIT (OUTPATIENT)
Dept: ORTHOPEDIC SURGERY | Age: 59
End: 2024-07-17
Payer: COMMERCIAL

## 2024-07-17 DIAGNOSIS — M65.28 CALCIFIC ACHILLES TENDONITIS: ICD-10-CM

## 2024-07-17 DIAGNOSIS — M76.62 ACHILLES TENDINITIS OF BOTH LOWER EXTREMITIES: ICD-10-CM

## 2024-07-17 DIAGNOSIS — M76.61 ACHILLES TENDINITIS OF BOTH LOWER EXTREMITIES: ICD-10-CM

## 2024-07-17 DIAGNOSIS — M79.672 PAIN OF LEFT HEEL: Primary | ICD-10-CM

## 2024-07-17 DIAGNOSIS — M79.671 PAIN OF RIGHT HEEL: ICD-10-CM

## 2024-07-17 PROCEDURE — 99214 OFFICE O/P EST MOD 30 MIN: CPT | Performed by: ORTHOPAEDIC SURGERY

## 2024-07-17 NOTE — PROGRESS NOTES
Name: Sarah Alaniz  YOB: 1965  Gender: female  MRN: [id number]    CC: Heel pain    HPI:   > 5 years of heel pain: Left > Right   2019: Breast cancer treatment with medication that flared joint pain increasing posterior heel pain  07/17/2024: Initial visit: Bilateral heel pain: Left > Right     ROS/Meds/PSH/PMH/FH/SH: only reviewed pertinent/relevant information today    Tobacco:  reports that she has never smoked. She has never used smokeless tobacco.     Physical Examination:  Patient appears to be alert and oriented with acceptable appearance.  No obvious distress or SOB  CV: LE acceptable vascular flow, color and capillary refill  Neuro: LE mostly intact light touch sensation   Skin: Bilateral = posterior heel enthesopathic thickening  MS: Standing: Plantigrade: Gait limited for pushoff  Bilateral = Left > Right insertional Achilles pain; no gap  Bilateral = no plantar fascia pain    XR: Right: Left: Standing AP lateral mortise ankle plus AP oblique foot taken today with posterior and plantar heel enthesopathy; no acute ankle or hindfoot pathology; moderate arthritis  XR Impression:  As above      Reviewed Test/Records/Documents: Several oncology notes reviewed    Assessment:    Bilateral Left > Right calcific insertional Achilles tendinitis    Plan:   The patient and I discussed the above assessment. We explored treatment options.     She has dealt with progressive Left > Right calcific insertional Achilles concerns over the last 5+ years  With her progressive problems, she more likely will require surgery  She failed time, shoes, physical therapy and medication    Advanced medical imaging: Left ankle/hindfoot MRI scan: Assess degree of calcific insertional Achilles tendinitis    DME: Heel lifts  DME medical necessity:  Both heel lifts are necessary to provide structural stability and protect/stabilize/support the injured/weakened unstable Achilles tendon from further damage/injury

## 2024-07-18 NOTE — PROGRESS NOTES
The patient was prescribed and fitted with bilateral heel lifts.     Patient read and signed documenting they understand and agree to [facility] current DME return policy.

## 2024-07-23 ENCOUNTER — OFFICE VISIT (OUTPATIENT)
Age: 59
End: 2024-07-23

## 2024-07-23 DIAGNOSIS — M65.4 DE QUERVAIN'S TENOSYNOVITIS, RIGHT: Primary | ICD-10-CM

## 2024-07-23 PROCEDURE — 99024 POSTOP FOLLOW-UP VISIT: CPT | Performed by: NURSE PRACTITIONER

## 2024-07-23 NOTE — PROGRESS NOTES
Orthopaedic Hand Surgery Note    Name: Sarah Alaniz  YOB: 1965  Gender: female  MRN: [id number]    HPI: Patient is status post WRIST DEQUERVAINS release on the right - Right on 7/8/2024. Patient reports pain is improved. No fevers or chills.    Physical Examination:  Wound healing well.  There is no erythema or drainage.  Good finger and wrist range of motion. Pain is improved from preoperative.  Patient has expected postoperative swelling.    Assessment:   1. De Quervain's tenosynovitis, right        Status post WRIST DEQUERVAINS release on the right - Right on 7/8/2024    Plan:  We discussed the treatment and therapy plan. We instructed the patient on wound care/scar massage and will continue compressive wrapping for 4 weeks as needed for swelling. Patient was instructed on ROM exercises and will monitor progress - if motion is limited over the next 7-10 days we will progress into formal hand therapy. Lifting, pushing, pulling and carrying will be limited to 5 pounds and under for 4 weeks post-operative. Patient will return to clinic in 4 weeks for re-evaluation.  She can cancel that appointment if she is doing well.    Suzie Serra NP  Orthopaedic Surgery  07/23/24  11:08 AM

## 2024-08-05 ENCOUNTER — OFFICE VISIT (OUTPATIENT)
Dept: ORTHOPEDIC SURGERY | Age: 59
End: 2024-08-05
Payer: COMMERCIAL

## 2024-08-05 DIAGNOSIS — M76.62 ACHILLES TENDINITIS, LEFT LEG: Primary | ICD-10-CM

## 2024-08-05 PROCEDURE — 99214 OFFICE O/P EST MOD 30 MIN: CPT | Performed by: ORTHOPAEDIC SURGERY

## 2024-08-05 NOTE — PROGRESS NOTES
possible [facility] transfer and Haglunds excision    Outpatient - 45 minutes    Anesthesia - Choice, Prone position  Power rasp, suture bridge, possible biotenodesis screws         R/C outlined, tunde. re-rupture of Achilles, infection of the skin, tendon and bone and the possibility of chronic pain.  Casting and potential one year recovery outlined. The patient accepts and would like to proceed with surgery.  We discussed that operative versus non-operative treatment of Achilles tendon ruptures can both yield satisfactory results. We also discussed the increased risk of re-rupture with non-operative treatment of Achilles tears and that typical return to sports in most patients is 6 months after surgery.  The patient understands the pros and cons of each choice and wishes to proceed with surgery.

## 2024-08-06 DIAGNOSIS — M76.62 TENDONITIS, ACHILLES, LEFT: ICD-10-CM

## 2024-08-06 DIAGNOSIS — M76.62 ACHILLES TENDINITIS, LEFT LEG: Primary | ICD-10-CM

## 2024-08-12 ENCOUNTER — PATIENT MESSAGE (OUTPATIENT)
Dept: FAMILY MEDICINE CLINIC | Facility: CLINIC | Age: 59
End: 2024-08-12

## 2024-08-12 DIAGNOSIS — I10 ESSENTIAL HYPERTENSION: ICD-10-CM

## 2024-08-13 RX ORDER — VALSARTAN AND HYDROCHLOROTHIAZIDE 80; 12.5 MG/1; MG/1
1 TABLET, FILM COATED ORAL DAILY
Qty: 30 TABLET | Refills: 0 | Status: SHIPPED | OUTPATIENT
Start: 2024-08-13

## 2024-08-20 ENCOUNTER — TELEPHONE (OUTPATIENT)
Dept: ORTHOPEDIC SURGERY | Age: 59
End: 2024-08-20

## 2024-08-20 ENCOUNTER — OFFICE VISIT (OUTPATIENT)
Dept: ORTHOPEDIC SURGERY | Age: 59
End: 2024-08-20
Payer: COMMERCIAL

## 2024-08-20 DIAGNOSIS — M17.12 PRIMARY OSTEOARTHRITIS OF LEFT KNEE: ICD-10-CM

## 2024-08-20 DIAGNOSIS — M17.11 PRIMARY OSTEOARTHRITIS OF RIGHT KNEE: Primary | ICD-10-CM

## 2024-08-20 PROCEDURE — 20610 DRAIN/INJ JOINT/BURSA W/O US: CPT | Performed by: ORTHOPAEDIC SURGERY

## 2024-08-20 NOTE — PROGRESS NOTES
Name: Sarah Alaniz  YOB: 1965  Gender: female  MRN: [id number]    Allergies   Allergen Reactions    Latex Hives    Sulfa Antibiotics Hives and Rash    Adhesive Tape Other (See Comments)     Itching, rash    Lisinopril Other (See Comments)       CC:  bilateral knee pain, Osteoarthritis    PROCEDURE:  HA injection(s). All questions answered.     Procedure Note: The patient was placed in upright position with both knees hanging freely from exam table.  Both knees were prepped in sterile fashion using alcohol wipe(s).  Using an infrapatellar approach, 3cc of Durolane was injected freely.  The needle was then removed, pressure hemostatis achieved, injection site was cleansed with alcohol wipe and dressed with band aid.    The patient tolerated the procedure without complication.  The patient  will follow up as scheduled.    08/20/24

## 2024-08-20 NOTE — TELEPHONE ENCOUNTER
"[facility] Discharge Summary    Estevan Callejas MRN: [id number]   YOB: 1974 Age: 43 year old     Date of Admission:  1/13/2018  Date of Discharge::  1/18/2018  Admitting Physician:  Stefania Munroe MD  Discharge Physician:  Stefania Munroe MD  Primary Care Physician:         Lawanda Barrera          Discharge Diagnosis:   Ronit-rectal abscess         Procedures:   Incision and drainage or perineal abscess, 1/13/18  Serial wound vac change perineal: 1/14/18, 1/15/18, 1/18/18          Consultations:   OCCUPATIONAL THERAPY ADULT IP CONSULT  PHYSICAL THERAPY ADULT IP CONSULT  CASE MANAGEMENT ADULT IP CONSULT  VASCULAR ACCESS CARE ADULT IP CONSULT          HPI (from H&P):   \"Estevan Callejas is a 43 year old male with a past medical history significant for DM type II who presents with a 5 day history of fevers,  Ronit-anal pain and ronit-anal swelling.  first noticed his symptoms on 1/8/2017 when he started have ronit-anal discomfort. He saw a physician on 1/10 who attributed it to constipation. Over the last 2 days, he has noticed worsening perianal pain, scrotal and perianal swelling and associated fevers, diaphoresis and chills. He denies any dysuria, difficulty in initiating urination , nausea, emesis ,abdominal pain , SOB or chest pain. He reports having a BM yesterday after an enema but denies noticing any blood in the stools.      He denies any trauma to his perineum. He denies any FH of colorectal cancer or IBD. He denies any FH of bleeding or clotting disorders. He denies use of anticoagulants.      Workup in ED revealed a WBC of 17.8, Hb 12.99, Plt 163, Na 131, Cr 0.71,  and LA 1.7 and . CT scan of abdomen and pelvis revealed \"large fluid collection in the perineum with an air-fluid level. This collection measures approximately 9.7 cm AP by 5.2 cm transverse by 6.5 cm craniocaudal. In addition, gas and fluid are evident extending up into the " She needs the CPT code for her upcoming surgery. Please call.   "scrotum. Soft tissues of the proximal legs appear normal\" \"           Hospital Course:   The patient underwent listed procedures above, which he tolerated without complications. He was treated with antibiotics during hospitalization. Remainder of hospitalization unremarkable.    At the time of discharge, he was tolerating PO intake, ambulating, voiding spontaneously without difficulty, and pain was controlled with oral pain medications. The patient was discharged home in stable and improved condition. With plans to have wound vac changes by [facility] clinic M/W/F for at least two weeks, and follow-up with general surgery in 2 weeks.          Final Pathology Result:   No pathology submitted         Medications Prior to Admission:     Prescriptions Prior to Admission   Medication Sig Dispense Refill Last Dose     metFORMIN (GLUCOPHAGE) 500 MG tablet TAKE TWO TABLETS BY MOUTH TWICE A DAY WITH MEALS 120 tablet 0 1/12/2018 at Am     gabapentin (NEURONTIN) 300 MG capsule Take 1 capsule (300 mg) by mouth 3 times daily 270 capsule 1 1/12/2018 at AM     [DISCONTINUED] oxyCODONE-acetaminophen (PERCOCET) 5-325 MG per tablet Take 1-2 tablets by mouth every 4 hours as needed 20 tablet 0 Past Week at Unknown time     lisinopril (PRINIVIL/ZESTRIL) 2.5 MG tablet Take 1 tablet (2.5 mg) by mouth daily (Patient not taking: Reported on 1/10/2018) 90 tablet 1 More than a month at Unknown time     aspirin 81 MG tablet Take 1 tablet (81 mg) by mouth daily 90 tablet 3 More than a month at Unknown time     blood glucose monitoring (NO BRAND SPECIFIED) test strip Use to test blood sugars  One  times daily in am 90 each 3 Taking     blood glucose monitoring (NO BRAND SPECIFIED) meter device kit Use to test blood sugar one times daily or as directed. 1 kit 0 Taking     blood glucose (NO BRAND SPECIFIED) lancets standard Use to test blood sugar one times daily or as directed. 1 Box 0 Taking            Discharge Medications:     Current Discharge " Medication List      START taking these medications    Details   acetaminophen (TYLENOL) 325 MG tablet Take 2 tablets (650 mg) by mouth every 4 hours as needed for other (multimodal surgical pain management along with NSAIDS and opioid medication as indicated based on pain control and physical function.)  Qty: 100 tablet    Associated Diagnoses: Perirectal abscess      oxyCODONE IR (ROXICODONE) 5 MG tablet Take 1-2 tablets (5-10 mg) by mouth every 3 hours as needed for breakthrough pain or moderate to severe pain  Qty: 30 tablet, Refills: 0    Comments: Take 30 minutes prior to wound vacuum changes.  Associated Diagnoses: Perirectal abscess      polyethylene glycol (MIRALAX/GLYCOLAX) Packet Take 17 g by mouth daily as needed for constipation  Qty: 30 packet, Refills: 1    Comments: Take daily, if you have diarrhea do not take.  Associated Diagnoses: Drug-induced constipation         CONTINUE these medications which have NOT CHANGED    Details   metFORMIN (GLUCOPHAGE) 500 MG tablet TAKE TWO TABLETS BY MOUTH TWICE A DAY WITH MEALS  Qty: 120 tablet, Refills: 0    Associated Diagnoses: Type 2 diabetes mellitus with complication, without long-term current use of insulin (H)      gabapentin (NEURONTIN) 300 MG capsule Take 1 capsule (300 mg) by mouth 3 times daily  Qty: 270 capsule, Refills: 1    Associated Diagnoses: Type 2 diabetes mellitus without complication, without long-term current use of insulin (H)      lisinopril (PRINIVIL/ZESTRIL) 2.5 MG tablet Take 1 tablet (2.5 mg) by mouth daily  Qty: 90 tablet, Refills: 1    Associated Diagnoses: Type 2 diabetes mellitus without complication, without long-term current use of insulin (H)      aspirin 81 MG tablet Take 1 tablet (81 mg) by mouth daily  Qty: 90 tablet, Refills: 3    Associated Diagnoses: Type 2 diabetes mellitus without complication, without long-term current use of insulin (H)      blood glucose monitoring (NO BRAND SPECIFIED) test strip Use to test blood  sugars  One  times daily in am  Qty: 90 each, Refills: 3    Associated Diagnoses: Type 2 diabetes mellitus with complication, without long-term current use of insulin (H)      blood glucose monitoring (NO BRAND SPECIFIED) meter device kit Use to test blood sugar one times daily or as directed.  Qty: 1 kit, Refills: 0    Associated Diagnoses: Type 2 diabetes mellitus with complication, without long-term current use of insulin (H)      blood glucose (NO BRAND SPECIFIED) lancets standard Use to test blood sugar one times daily or as directed.  Qty: 1 Box, Refills: 0    Associated Diagnoses: Type 2 diabetes mellitus with complication, without long-term current use of insulin (H)         STOP taking these medications       oxyCODONE-acetaminophen (PERCOCET) 5-325 MG per tablet Comments:   Reason for Stopping:                    Day of Discharge Physical Exam:   Temp:  [97.1  F (36.2  C)-98.7  F (37.1  C)] 97.7  F (36.5  C)  Pulse:  [52-88] 64  Heart Rate:  [56] 56  Resp:  [16-18] 16  BP: (111-148)/(62-89) 148/89  SpO2:  [94 %-98 %] 98 %  General: A&O, NAD, lying comfortably in bed  CV: RRR, WWP extremities  Pulm: NLB on RA  Abd: Soft,ND, NT  : Wound vac in place, no drainage, no surrounding erythema, some edema of scrotum  Extremities: No edema  Neuro: Moving all extremities spontaneously without apparent deficit         Discharge Instructions and Follow-Up:     Home care nursing referral     Reason for your hospital stay   You were admitted for surgical management of a perirectal abscess. This abscess was opened and drained, and continues to remain open with a wound vacuum.     Adult [facility] Follow-up and recommended labs and tests   Follow up with primary care provider, Lawanda Barrera, within 7 days for hospital follow- up.       Appointments on [place] and/or [facility] (with [facility] or [facility] provider or service). Call [phone] if you haven't heard regarding these appointments within 7 days of discharge.      Follow Up and recommended labs and tests   Follow up with General Surgery in 2 weeks.     Activity   Your activity upon discharge: activity as tolerated     When to contact your care team   Call your primary doctor if you have any of the following: temperature greater than 100.4F or less than 97, increased drainage, increased swelling or increased pain.  Call Wound Care if you have any of the following: increased drainage, leakage around wound vacuum, or if wound vacuum is not keeping an adequate seal and malfunctioning. You may use wet-to-dry dressing until the vacuum can be fixed/adjusted/respositioned.     Discharge Equipment: Wound Vac   Negative Pressure Wound Therapy to 125 wound with continuous suction.     Cleanse wound with saline or wound cleanser. Dressing change Monday/Wednesday/Friday for 2 weeks. Change canister weekly and when full.     Supplies: contact layer and black foam    Teach pt/family how to remove dressing and apply wet to damp dressing, in the event that dressing leaks or machine malfunctions.  Consult [facility] nurse.      Follow-up for wound care .     Discharge Instructions   If you have questions about your follow-up appointment, please call the General Surgery Clinic at [phone].     WHEN TO CALL OR SEEK CARE:  Please call or seek medical attention if you experience increasing abdominal pain, nausea, vomiting, increasing drainage from or spreading redness around your wounds, chills, or fever >101.5.   - During normal business hours, please contact the General Surgery clinic at [phone].  - If you are having troubles in the evenings, at night, or on weekends, call [phone] and ask to speak with surgery resident on call.    Do not lift more than 20 pounds for 4-6 weeks after surgery.     Keep wound clean and dry, okay to use wipes over the area. Sponge bath only, do not take showers or soak in tub.     Do not soak in a bath tub or pool.    No driving while on narcotic pain  medications.     Take miralax daily, if having diarrhea please skip the miralax for one day.    You may take Tylenol (acetaminophen) and/or Motrin (ibuprofen) in addition or instead of narcotic medication; it is helpful to take these medications as you wean down off of the narcotic medications. If you have been prescribed Percocet, be aware that it contains Tylenol and oxycodone together. Do not take a total of more than 3500 mg of Tylenol per 24 hours to avoid liver damage.     Wound care and dressings   Instructions to care for your wound at home: You will need wound vacuum changes Monday/Wednesday/Friday for at least 2 weeks. You will then follow-up with the General Surgery team as an outpatient, and then will likely transition to wet-to-dry dressing changes two times daily.     Full Code     Diet   Follow this diet upon discharge: Regular Diet Adult         Condition at discharge: Stable      - - - - - - - - - - - - - - - - - -  Rae Feldman DO  PGY1

## 2024-08-21 NOTE — PRE-PROCEDURE INSTRUCTIONS
Patient verified name and .  Order for consent not found in EHR and patient verifies procedure.   Type lB surgery, phone assessment complete.  Orders were not received.  Labs per surgeon: none at present time  Labs per anesthesia protocol: Potassium on DOS    Patient answered medical/surgical history questions at their best of ability. All prior to admission medications documented in EPIC.    Patient instructed to continue taking all prescription medications up to the day of surgery but to take only the following medications the day of surgery according to anesthesia guidelines with a small sip of water: Bupropion, Nexium, Femara   Patient is requested to take 2 Tylenol in the morning and then again before bed on the day before surgery. Regular or extra strength may be used.       Patient informed that all vitamins and supplements should be held 7 days prior to surgery and NSAIDS 5 days prior to surgery.   Prescription meds to hold:Valsartan- HCTZ am of procedure    Patient instructed on the following:    > Arrive at [facility] OPC Entrance, time of arrival to be called the day before by 1700  > NPO after midnight, unless otherwise indicated, including gum, mints, and ice chips  > Responsible adult must drive patient to the hospital, stay during surgery, and patient will need supervision 24 hours after anesthesia  > Use non moisturizing soap in shower the night before surgery and on the morning of surgery  > All piercings must be removed prior to arrival.    > Leave all valuables (money and jewelry) at home but bring insurance card and ID on DOS.   > You may be required to pay a deductible or co-pay on the day of your procedure. You can pre-pay by calling [phone] if your surgery is at the [facility] or [phone] if your surgery is at the [facility].  > Do not wear make-up, nail polish, lotions, cologne, perfumes, powders, or oil on skin. Artificial nails are not permitted.

## 2024-08-23 ENCOUNTER — TELEMEDICINE (OUTPATIENT)
Dept: FAMILY MEDICINE CLINIC | Facility: CLINIC | Age: 59
End: 2024-08-23
Payer: COMMERCIAL

## 2024-08-23 DIAGNOSIS — I10 ESSENTIAL HYPERTENSION: Primary | ICD-10-CM

## 2024-08-23 DIAGNOSIS — R30.0 DYSURIA: ICD-10-CM

## 2024-08-23 PROCEDURE — 99214 OFFICE O/P EST MOD 30 MIN: CPT | Performed by: FAMILY MEDICINE

## 2024-08-23 RX ORDER — VALSARTAN AND HYDROCHLOROTHIAZIDE 80; 12.5 MG/1; MG/1
1 TABLET, FILM COATED ORAL DAILY
Qty: 90 TABLET | Refills: 1 | Status: SHIPPED | OUTPATIENT
Start: 2024-08-23

## 2024-08-27 ASSESSMENT — ENCOUNTER SYMPTOMS
CONSTIPATION: 0
COUGH: 0
DIARRHEA: 0
VOMITING: 0
SHORTNESS OF BREATH: 0

## 2024-08-27 NOTE — PROGRESS NOTES
Sarah Alaniz (:  1965) is a Established patient, here for evaluation of the following:    Assessment & Plan   Below is the assessment and plan developed based on review of pertinent history, physical exam, labs, studies, and medications.  1. Essential hypertension  -     valsartan-hydroCHLOROthiazide (DIOVAN-HCT) 80-12.5 MG per tablet; Take 1 tablet by mouth daily, Disp-90 tablet, R-1Normal  2. Dysuria  Suspect IC or inflammatory component vs. Vaginal atrophy 2/2 letrozole. She may be able to stop it in a few months.  For now will do low acid IC friendly diet.   No follow-ups on file.       Subjective   HPI  Chief Complaint   Patient presents with    Dysuria     Was having urgency to urinate, but that has got. Not having any symptoms at the time.   Has had several episodes where she thought she was getting a UTI but her urine test was clear. Has urgency and frequency usually and then only urinatea a small amount.     Review of Systems   Constitutional:  Negative for diaphoresis and unexpected weight change.   HENT:  Negative for congestion.    Eyes:  Negative for visual disturbance.   Respiratory:  Negative for cough and shortness of breath.    Cardiovascular:  Negative for chest pain.   Gastrointestinal:  Negative for constipation, diarrhea and vomiting.   Genitourinary:  Positive for dysuria, frequency and urgency.   Neurological:  Negative for headaches.   Psychiatric/Behavioral:  Negative for dysphoric mood and sleep disturbance. The patient is not nervous/anxious.           Objective     Physical Exam  [INSTRUCTIONS:  \"[x]\" Indicates a positive item  \"[]\" Indicates a negative item  -- DELETE ALL ITEMS NOT EXAMINED]    Constitutional: [x] Appears well-developed and well-nourished [x] No apparent distress      [] Abnormal -     Mental status: [x] Alert and awake  [x] Oriented to person/place/time [x] Able to follow commands    [] Abnormal -     Eyes:   EOM    [x]  Normal    [] Abnormal -

## 2024-08-28 ENCOUNTER — ANESTHESIA EVENT (OUTPATIENT)
Dept: SURGERY | Age: 59
End: 2024-08-28
Payer: COMMERCIAL

## 2024-08-28 NOTE — PERIOP NOTE
Preop department called to notify patient of arrival time for scheduled procedure. Instructions given to   - Arrive at OPC Entrance [address].  - Remain NPO after midnight, unless otherwise indicated, including gum, mints, and ice chips.   - Have a responsible adult to drive patient to the hospital, stay during surgery, and patient will need supervision 24 hours after anesthesia.   - Use antibacterial soap in shower the night before surgery and on the morning of surgery.       Was patient contacted: yes-pt  Voicemail left:   Numbers contacted: [phone]   Arrival time: 0730

## 2024-08-29 ENCOUNTER — ANESTHESIA (OUTPATIENT)
Dept: SURGERY | Age: 59
End: 2024-08-29
Payer: COMMERCIAL

## 2024-08-29 ENCOUNTER — HOSPITAL ENCOUNTER (OUTPATIENT)
Age: 59
Setting detail: OUTPATIENT SURGERY
Discharge: HOME | End: 2024-08-29
Attending: ORTHOPAEDIC SURGERY | Admitting: ORTHOPAEDIC SURGERY
Payer: COMMERCIAL

## 2024-08-29 VITALS
RESPIRATION RATE: 18 BRPM | HEART RATE: 65 BPM | BODY MASS INDEX: 35.79 KG/M2 | SYSTOLIC BLOOD PRESSURE: 113 MMHG | HEIGHT: 67 IN | DIASTOLIC BLOOD PRESSURE: 71 MMHG | OXYGEN SATURATION: 100 % | TEMPERATURE: 97.1 F | WEIGHT: 228 LBS

## 2024-08-29 DIAGNOSIS — G89.18 ACUTE POST-OPERATIVE PAIN: Primary | ICD-10-CM

## 2024-08-29 LAB — POTASSIUM BLD-SCNC: 3.8 MMOL/L (ref 3.5–5.1)

## 2024-08-29 PROCEDURE — 2580000003 HC RX 258: Performed by: ANESTHESIOLOGY

## 2024-08-29 PROCEDURE — 2580000003 HC RX 258: Performed by: NURSE ANESTHETIST, CERTIFIED REGISTERED

## 2024-08-29 PROCEDURE — 7100000011 HC PHASE II RECOVERY - ADDTL 15 MIN: Performed by: ORTHOPAEDIC SURGERY

## 2024-08-29 PROCEDURE — 3700000000 HC ANESTHESIA ATTENDED CARE: Performed by: ORTHOPAEDIC SURGERY

## 2024-08-29 PROCEDURE — 2709999900 HC NON-CHARGEABLE SUPPLY: Performed by: ORTHOPAEDIC SURGERY

## 2024-08-29 PROCEDURE — 3700000001 HC ADD 15 MINUTES (ANESTHESIA): Performed by: ORTHOPAEDIC SURGERY

## 2024-08-29 PROCEDURE — 64445 NJX AA&/STRD SCIATIC NRV IMG: CPT | Performed by: ANESTHESIOLOGY

## 2024-08-29 PROCEDURE — 7100000001 HC PACU RECOVERY - ADDTL 15 MIN: Performed by: ORTHOPAEDIC SURGERY

## 2024-08-29 PROCEDURE — 6370000000 HC RX 637 (ALT 250 FOR IP): Performed by: ANESTHESIOLOGY

## 2024-08-29 PROCEDURE — 7100000010 HC PHASE II RECOVERY - FIRST 15 MIN: Performed by: ORTHOPAEDIC SURGERY

## 2024-08-29 PROCEDURE — 7100000000 HC PACU RECOVERY - FIRST 15 MIN: Performed by: ORTHOPAEDIC SURGERY

## 2024-08-29 PROCEDURE — C1713 ANCHOR/SCREW BN/BN,TIS/BN: HCPCS | Performed by: ORTHOPAEDIC SURGERY

## 2024-08-29 PROCEDURE — 2500000003 HC RX 250 WO HCPCS: Performed by: ANESTHESIOLOGY

## 2024-08-29 PROCEDURE — 3600000014 HC SURGERY LEVEL 4 ADDTL 15MIN: Performed by: ORTHOPAEDIC SURGERY

## 2024-08-29 PROCEDURE — 6360000002 HC RX W HCPCS: Performed by: NURSE ANESTHETIST, CERTIFIED REGISTERED

## 2024-08-29 PROCEDURE — 2500000003 HC RX 250 WO HCPCS: Performed by: NURSE ANESTHETIST, CERTIFIED REGISTERED

## 2024-08-29 PROCEDURE — 3600000004 HC SURGERY LEVEL 4 BASE: Performed by: ORTHOPAEDIC SURGERY

## 2024-08-29 PROCEDURE — 6360000002 HC RX W HCPCS: Performed by: ANESTHESIOLOGY

## 2024-08-29 PROCEDURE — 2720000010 HC SURG SUPPLY STERILE: Performed by: ORTHOPAEDIC SURGERY

## 2024-08-29 PROCEDURE — 6360000002 HC RX W HCPCS: Performed by: NURSE PRACTITIONER

## 2024-08-29 PROCEDURE — 84132 ASSAY OF SERUM POTASSIUM: CPT

## 2024-08-29 PROCEDURE — 2780000010 HC IMPLANT OTHER: Performed by: ORTHOPAEDIC SURGERY

## 2024-08-29 PROCEDURE — 64447 NJX AA&/STRD FEMORAL NRV IMG: CPT | Performed by: ANESTHESIOLOGY

## 2024-08-29 DEVICE — HEALICOIL KNOTLESS PK  NST
Type: IMPLANTABLE DEVICE | Site: ANKLE | Status: FUNCTIONAL
Brand: HEALICOIL

## 2024-08-29 DEVICE — HEALICOIL PK SUTURE ANCHOR WITH                                    NEEDLES ULTRATAPE BLUE COBRAID
Type: IMPLANTABLE DEVICE | Site: ANKLE | Status: FUNCTIONAL
Brand: HEALICOIL

## 2024-08-29 DEVICE — SCREW INTFR L23MM DIA7MM BIOCOMPOSITE FACILITATE IORT TISS: Type: IMPLANTABLE DEVICE | Site: ANKLE | Status: FUNCTIONAL

## 2024-08-29 RX ORDER — BUPIVACAINE HYDROCHLORIDE AND EPINEPHRINE 2.5; 5 MG/ML; UG/ML
INJECTION, SOLUTION EPIDURAL; INFILTRATION; INTRACAUDAL; PERINEURAL
Status: COMPLETED | OUTPATIENT
Start: 2024-08-29 | End: 2024-08-29

## 2024-08-29 RX ORDER — LIDOCAINE HYDROCHLORIDE 20 MG/ML
INJECTION, SOLUTION EPIDURAL; INFILTRATION; INTRACAUDAL; PERINEURAL
Status: COMPLETED | OUTPATIENT
Start: 2024-08-29 | End: 2024-08-29

## 2024-08-29 RX ORDER — SODIUM CHLORIDE, SODIUM LACTATE, POTASSIUM CHLORIDE, CALCIUM CHLORIDE 600; 310; 30; 20 MG/100ML; MG/100ML; MG/100ML; MG/100ML
INJECTION, SOLUTION INTRAVENOUS CONTINUOUS
Status: DISCONTINUED | OUTPATIENT
Start: 2024-08-29 | End: 2024-08-29 | Stop reason: HOSPADM

## 2024-08-29 RX ORDER — SODIUM CHLORIDE 9 MG/ML
INJECTION, SOLUTION INTRAVENOUS CONTINUOUS
Status: DISCONTINUED | OUTPATIENT
Start: 2024-08-29 | End: 2024-08-29 | Stop reason: HOSPADM

## 2024-08-29 RX ORDER — PROPOFOL 10 MG/ML
INJECTION, EMULSION INTRAVENOUS PRN
Status: DISCONTINUED | OUTPATIENT
Start: 2024-08-29 | End: 2024-08-29 | Stop reason: SDUPTHER

## 2024-08-29 RX ORDER — SODIUM CHLORIDE 0.9 % (FLUSH) 0.9 %
5-40 SYRINGE (ML) INJECTION PRN
Status: DISCONTINUED | OUTPATIENT
Start: 2024-08-29 | End: 2024-08-29 | Stop reason: HOSPADM

## 2024-08-29 RX ORDER — DEXAMETHASONE SODIUM PHOSPHATE 10 MG/ML
INJECTION INTRAMUSCULAR; INTRAVENOUS PRN
Status: DISCONTINUED | OUTPATIENT
Start: 2024-08-29 | End: 2024-08-29 | Stop reason: SDUPTHER

## 2024-08-29 RX ORDER — MIDAZOLAM HYDROCHLORIDE 2 MG/2ML
2 INJECTION, SOLUTION INTRAMUSCULAR; INTRAVENOUS
Status: COMPLETED | OUTPATIENT
Start: 2024-08-29 | End: 2024-08-29

## 2024-08-29 RX ORDER — OXYCODONE HYDROCHLORIDE 5 MG/1
5 TABLET ORAL EVERY 6 HOURS PRN
Qty: 20 TABLET | Refills: 0 | Status: SHIPPED | OUTPATIENT
Start: 2024-08-29 | End: 2024-09-03

## 2024-08-29 RX ORDER — ACETAMINOPHEN 500 MG
1000 TABLET ORAL ONCE
Status: COMPLETED | OUTPATIENT
Start: 2024-08-29 | End: 2024-08-29

## 2024-08-29 RX ORDER — BUPIVACAINE HYDROCHLORIDE AND EPINEPHRINE 5; 5 MG/ML; UG/ML
INJECTION, SOLUTION EPIDURAL; INTRACAUDAL; PERINEURAL
Status: COMPLETED | OUTPATIENT
Start: 2024-08-29 | End: 2024-08-29

## 2024-08-29 RX ORDER — OXYCODONE HYDROCHLORIDE 5 MG/1
10 TABLET ORAL PRN
Status: DISCONTINUED | OUTPATIENT
Start: 2024-08-29 | End: 2024-08-29 | Stop reason: HOSPADM

## 2024-08-29 RX ORDER — SODIUM CHLORIDE 0.9 % (FLUSH) 0.9 %
5-40 SYRINGE (ML) INJECTION EVERY 12 HOURS SCHEDULED
Status: DISCONTINUED | OUTPATIENT
Start: 2024-08-29 | End: 2024-08-29 | Stop reason: HOSPADM

## 2024-08-29 RX ORDER — SODIUM CHLORIDE 9 MG/ML
INJECTION, SOLUTION INTRAVENOUS PRN
Status: DISCONTINUED | OUTPATIENT
Start: 2024-08-29 | End: 2024-08-29 | Stop reason: HOSPADM

## 2024-08-29 RX ORDER — LIDOCAINE HYDROCHLORIDE 10 MG/ML
1 INJECTION, SOLUTION INFILTRATION; PERINEURAL
Status: DISCONTINUED | OUTPATIENT
Start: 2024-08-29 | End: 2024-08-29 | Stop reason: HOSPADM

## 2024-08-29 RX ORDER — ONDANSETRON 2 MG/ML
INJECTION INTRAMUSCULAR; INTRAVENOUS PRN
Status: DISCONTINUED | OUTPATIENT
Start: 2024-08-29 | End: 2024-08-29 | Stop reason: SDUPTHER

## 2024-08-29 RX ORDER — FENTANYL CITRATE 50 UG/ML
100 INJECTION, SOLUTION INTRAMUSCULAR; INTRAVENOUS
Status: COMPLETED | OUTPATIENT
Start: 2024-08-29 | End: 2024-08-29

## 2024-08-29 RX ORDER — ROCURONIUM BROMIDE 10 MG/ML
INJECTION, SOLUTION INTRAVENOUS PRN
Status: DISCONTINUED | OUTPATIENT
Start: 2024-08-29 | End: 2024-08-29 | Stop reason: SDUPTHER

## 2024-08-29 RX ORDER — ONDANSETRON 2 MG/ML
4 INJECTION INTRAMUSCULAR; INTRAVENOUS
Status: DISCONTINUED | OUTPATIENT
Start: 2024-08-29 | End: 2024-08-29 | Stop reason: HOSPADM

## 2024-08-29 RX ORDER — FAMOTIDINE 20 MG/1
20 TABLET, FILM COATED ORAL ONCE
Status: COMPLETED | OUTPATIENT
Start: 2024-08-29 | End: 2024-08-29

## 2024-08-29 RX ORDER — OXYCODONE HYDROCHLORIDE 5 MG/1
5 TABLET ORAL PRN
Status: DISCONTINUED | OUTPATIENT
Start: 2024-08-29 | End: 2024-08-29 | Stop reason: HOSPADM

## 2024-08-29 RX ORDER — CEPHALEXIN 500 MG/1
500 CAPSULE ORAL 4 TIMES DAILY
Qty: 12 CAPSULE | Refills: 0 | Status: SHIPPED | OUTPATIENT
Start: 2024-08-29

## 2024-08-29 RX ORDER — HYDROMORPHONE HYDROCHLORIDE 2 MG/ML
0.5 INJECTION, SOLUTION INTRAMUSCULAR; INTRAVENOUS; SUBCUTANEOUS EVERY 10 MIN PRN
Status: DISCONTINUED | OUTPATIENT
Start: 2024-08-29 | End: 2024-08-29 | Stop reason: HOSPADM

## 2024-08-29 RX ORDER — HYDROMORPHONE HYDROCHLORIDE 2 MG/ML
0.25 INJECTION, SOLUTION INTRAMUSCULAR; INTRAVENOUS; SUBCUTANEOUS EVERY 5 MIN PRN
Status: DISCONTINUED | OUTPATIENT
Start: 2024-08-29 | End: 2024-08-29 | Stop reason: HOSPADM

## 2024-08-29 RX ORDER — NALOXONE HYDROCHLORIDE 0.4 MG/ML
INJECTION, SOLUTION INTRAMUSCULAR; INTRAVENOUS; SUBCUTANEOUS PRN
Status: DISCONTINUED | OUTPATIENT
Start: 2024-08-29 | End: 2024-08-29 | Stop reason: HOSPADM

## 2024-08-29 RX ORDER — DIPHENHYDRAMINE HYDROCHLORIDE 50 MG/ML
12.5 INJECTION INTRAMUSCULAR; INTRAVENOUS
Status: DISCONTINUED | OUTPATIENT
Start: 2024-08-29 | End: 2024-08-29 | Stop reason: HOSPADM

## 2024-08-29 RX ADMIN — BUPIVACAINE HYDROCHLORIDE AND EPINEPHRINE BITARTRATE 10 ML: 5; .005 INJECTION, SOLUTION EPIDURAL; INTRACAUDAL; PERINEURAL at 09:49

## 2024-08-29 RX ADMIN — SUGAMMADEX 200 MG: 100 INJECTION, SOLUTION INTRAVENOUS at 11:46

## 2024-08-29 RX ADMIN — BUPIVACAINE HYDROCHLORIDE AND EPINEPHRINE BITARTRATE 10 ML: 2.5; .005 INJECTION, SOLUTION EPIDURAL; INFILTRATION; INTRACAUDAL; PERINEURAL at 09:49

## 2024-08-29 RX ADMIN — ROCURONIUM BROMIDE 40 MG: 10 INJECTION, SOLUTION INTRAVENOUS at 11:11

## 2024-08-29 RX ADMIN — LIDOCAINE HYDROCHLORIDE 5 ML: 20 INJECTION, SOLUTION EPIDURAL; INFILTRATION; INTRACAUDAL; PERINEURAL at 09:49

## 2024-08-29 RX ADMIN — LIDOCAINE HYDROCHLORIDE 5 ML: 20 INJECTION, SOLUTION EPIDURAL; INFILTRATION; INTRACAUDAL; PERINEURAL at 09:55

## 2024-08-29 RX ADMIN — PROPOFOL 200 MG: 10 INJECTION, EMULSION INTRAVENOUS at 11:11

## 2024-08-29 RX ADMIN — PHENYLEPHRINE HYDROCHLORIDE 100 MCG: 10 INJECTION INTRAVENOUS at 11:41

## 2024-08-29 RX ADMIN — LIDOCAINE HYDROCHLORIDE 100 MG: 20 INJECTION, SOLUTION EPIDURAL; INFILTRATION; INTRACAUDAL; PERINEURAL at 11:11

## 2024-08-29 RX ADMIN — PHENYLEPHRINE HYDROCHLORIDE 100 MCG: 10 INJECTION INTRAVENOUS at 11:32

## 2024-08-29 RX ADMIN — BUPIVACAINE HYDROCHLORIDE AND EPINEPHRINE 5 ML: 2.5; 5 INJECTION, SOLUTION EPIDURAL; INFILTRATION; INTRACAUDAL; PERINEURAL at 09:55

## 2024-08-29 RX ADMIN — ONDANSETRON 4 MG: 2 INJECTION INTRAMUSCULAR; INTRAVENOUS at 11:21

## 2024-08-29 RX ADMIN — PROPOFOL 20 MG: 10 INJECTION, EMULSION INTRAVENOUS at 11:57

## 2024-08-29 RX ADMIN — DEXAMETHASONE SODIUM PHOSPHATE 10 MG: 10 INJECTION INTRAMUSCULAR; INTRAVENOUS at 11:21

## 2024-08-29 RX ADMIN — FAMOTIDINE 20 MG: 20 TABLET, FILM COATED ORAL at 09:05

## 2024-08-29 RX ADMIN — Medication 2000 MG: at 11:20

## 2024-08-29 RX ADMIN — PROPOFOL 10 MG: 10 INJECTION, EMULSION INTRAVENOUS at 11:52

## 2024-08-29 RX ADMIN — SODIUM CHLORIDE, POTASSIUM CHLORIDE, SODIUM LACTATE AND CALCIUM CHLORIDE: 600; 310; 30; 20 INJECTION, SOLUTION INTRAVENOUS at 09:15

## 2024-08-29 RX ADMIN — BUPIVACAINE HYDROCHLORIDE AND EPINEPHRINE 5 ML: 5; 5 INJECTION, SOLUTION EPIDURAL; INTRACAUDAL; PERINEURAL at 09:55

## 2024-08-29 RX ADMIN — MIDAZOLAM 2 MG: 1 INJECTION INTRAMUSCULAR; INTRAVENOUS at 09:49

## 2024-08-29 RX ADMIN — PHENYLEPHRINE HYDROCHLORIDE 100 MCG: 10 INJECTION INTRAVENOUS at 11:35

## 2024-08-29 RX ADMIN — ACETAMINOPHEN 1000 MG: 500 TABLET, FILM COATED ORAL at 09:05

## 2024-08-29 RX ADMIN — FENTANYL CITRATE 25 MCG: 50 INJECTION, SOLUTION INTRAMUSCULAR; INTRAVENOUS at 09:49

## 2024-08-29 ASSESSMENT — PAIN SCALES - GENERAL
PAINLEVEL_OUTOF10: 0
PAINLEVEL_OUTOF10: 0

## 2024-08-29 NOTE — OP NOTE
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 Item Serial No.  Lot No. LRB No. Used Action   ANCHOR SUTURE 5.0 MM KNOTLESS HEALICOIL - [id number]  ANCHOR SUTURE 5.0 MM KNOTLESS HEALICOIL  [employer] [id number] Left 2 Implanted   ANCHOR SUTURE PEEK NONABSORBABLE ULTRATAPE COBRAID BLUE - [id number]  ANCHOR SUTURE PEEK NONABSORBABLE ULTRATAPE COBRAID BLUE  [employer] [id number] Left 1 Implanted   Healicol PK Suture [place] with Needles ultratape (blue)     [id number] Left 1 Implanted   SCREW INTFR L23MM DIA7MM BIOCOMPOSITE FACILITATE IORT TISS - [id number]  SCREW INTFR L23MM DIA7MM BIOCOMPOSITE FACILITATE IORT TISS  [employer]WD [id number] Left 1 Implanted       Anesthesia:  Choice    Blood Loss:  minimal    Tourniquet:  Estimated thigh 35 minutes    Pre Operative Abx:   Ancef 2g            Pre Operative Course:  Sarah Alaniz is a 59 y.o. 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    Operation In Detail:  Patient was evaluated in the preoperative area.  We had a long discussion about the procedure and postoperative protocols.  The patient was then brought back to the operating room suite and placed in the operating room table.  A timeout was taken to identify the patient, procedure being performed, and laterality.  After this the patient was prepped and draped in the normal sterile fashion using a Betadine solution and/or a ChloraPrep

## 2024-08-29 NOTE — ANESTHESIA POSTPROCEDURE EVALUATION
Department of Anesthesiology  Postprocedure Note    Patient: Sarah Alaniz  MRN: [id number]  YOB: 1965  Date of evaluation: 8/29/2024    Procedure Summary       Date: 08/29/24 Room / Location: [facility] OP OR 01 / [facility]    Anesthesia Start: 1106 Anesthesia Stop: 1211    Procedures:       Left achilles secondary reconstruction repair (Left: Ankle)      Left possible flexor hallucis longus transfer (Left: Ankle)      Left [place] excision (Left: Toes) Diagnosis:       Tendonitis, Achilles, left      (Tendonitis, Achilles, left [M76.62])    Surgeons: Alpesh Taylor III, MD Responsible Provider: Marine Teague MD    Anesthesia Type: general ASA Status: 2            Anesthesia Type: No value filed.    Mary Ellen Phase I: Mary Ellen Score: 8    Mary Ellen Phase II: Mary Ellen Score: 10    Anesthesia Post Evaluation    Patient location during evaluation: PACU  Patient participation: complete - patient participated  Level of consciousness: awake and alert  Airway patency: patent  Nausea & Vomiting: no nausea  Cardiovascular status: hemodynamically stable  Respiratory status: acceptable  Hydration status: euvolemic  Pain management: adequate and satisfactory to patient        No notable events documented.

## 2024-08-29 NOTE — ANESTHESIA PROCEDURE NOTES
Peripheral Block    Patient location during procedure: pre-op  Reason for block: post-op pain management and at surgeon's request  Start time: 8/29/2024 9:55 AM  End time: 8/29/2024 9:58 AM  Staffing  Performed: anesthesiologist   Anesthesiologist: Marine Teague MD  Performed by: Marine Teague MD  Authorized by: Marine Teague MD    Preanesthetic Checklist  Completed: patient identified, IV checked, site marked, risks and benefits discussed, surgical/procedural consents, equipment checked, pre-op evaluation, timeout performed, anesthesia consent given, oxygen available and monitors applied/VS acknowledged  Peripheral Block   Patient position: supine  Prep: ChloraPrep  Provider prep: mask  Patient monitoring: cardiac monitor, continuous pulse ox, frequent blood pressure checks, IV access, oxygen and responsive to questions  Block type: Femoral  Adductor canal  Laterality: left  Injection technique: single-shot  Guidance: ultrasound guided  Local infiltration: decadron  Infiltration strength: 1 %  Local infiltration: decadron  Dose: 0.5 mL    Needle   Needle type: insulated echogenic nerve stimulator needle   Needle gauge: 20 G  Needle localization: ultrasound guidance  Assessment   Injection assessment: negative aspiration for heme, no paresthesia on injection, local visualized surrounding nerve on ultrasound and no intravascular symptoms  Slow fractionated injection: yes  Hemodynamics: stable  Outcomes: uncomplicated    Medications Administered  BUPivacaine 0.25%-EPINEPHrine injection 1:[id number] - Perineural   5 mL - 8/29/2024 9:55:00 AM  BUPivacaine 0.5%-EPINEPHrine injection 1:186287 - Perineural   5 mL - 8/29/2024 9:55:00 AM  lidocaine 2 % (PF) injection - Perineural   5 mL - 8/29/2024 9:55:00 AM

## 2024-08-29 NOTE — ANESTHESIA PRE PROCEDURE
Department of Anesthesiology  Preprocedure Note       Name:  Sarah Alaniz   Age:  59 y.o.  :  1965                                          MRN:  [id number]         Date:  2024      Surgeon: Surgeon(s):  Alpesh Taylor III, MD    Procedure: Procedure(s):  Left achilles secondary reconstruction repair  Left possible flexor hallucis longus transfer  Left Haglunds excision    Medications prior to admission:   Prior to Admission medications    Medication Sig Start Date End Date Taking? Authorizing Provider   valsartan-hydroCHLOROthiazide (DIOVAN-HCT) 80-12.5 MG per tablet Take 1 tablet by mouth daily 24  Yes Kitty Santillan MD   Omega-3 Fatty Acids (FISH OIL PO) Take by mouth daily   Yes ProviderElva MD   MAGNESIUM PO Take by mouth daily   Yes ProviderElva MD   buPROPion (WELLBUTRIN XL) 300 MG extended release tablet Take 1 tablet by mouth daily  Patient taking differently: Take 150 mg by mouth daily 24  Yes Kitty Santillan MD   esomeprazole (NEXIUM) 40 MG delayed release capsule TAKE 1 CAPSULE BY MOUTH DAILY 30 MIN BEFORE 1ST MEAL 23  Yes Elva Boland MD   letrozole (FEMARA) 2.5 MG tablet TAKE 1 TABLET (2.5 MG TOTAL) BY MOUTH ONCE DAILY TAKE WITH OR WITHOUT FOOD. 6/15/22  Yes Elva Boland MD       Current medications:    Current Facility-Administered Medications   Medication Dose Route Frequency Provider Last Rate Last Admin    ceFAZolin (ANCEF) 2000 mg in sterile water 20 mL IV syringe  2,000 mg IntraVENous On Call to OR Janie Woodall APRN - CNP        lactated ringers IV soln infusion   IntraVENous Continuous Janie Woodall APRN - CNP        sodium chloride flush 0.9 % injection 5-40 mL  5-40 mL IntraVENous 2 times per day Janie Woodall APRN - CNP        sodium chloride flush 0.9 % injection 5-40 mL  5-40 mL IntraVENous PRN Janie Woodall APRN - CNP        0.9 % sodium chloride infusion                   Date of last liquid consumption: 08/28/24                        Date of last solid food consumption: 08/28/24    BMI:   Wt Readings from Last 3 Encounters:   08/29/24 103.4 kg (228 lb)   07/08/24 104.3 kg (230 lb)   01/15/24 108.4 kg (239 lb)     Body mass index is 35.71 kg/m².    CBC:   Lab Results   Component Value Date/Time    WBC 4.1 01/15/2024 11:36 AM    RBC 4.07 01/15/2024 11:36 AM    HGB 12.4 01/15/2024 11:36 AM    HCT 36.3 01/15/2024 11:36 AM    MCV 89.2 01/15/2024 11:36 AM    RDW 13.9 01/15/2024 11:36 AM     01/15/2024 11:36 AM       CMP:   Lab Results   Component Value Date/Time     01/15/2024 11:36 AM    K 3.7 01/15/2024 11:36 AM     01/15/2024 11:36 AM    CO2 24 01/15/2024 11:36 AM    BUN 5 01/15/2024 11:36 AM    CREATININE 0.76 01/15/2024 11:36 AM    GFRAA 99 03/10/2021 03:38 PM    AGRATIO 1.2 03/23/2022 09:31 AM    LABGLOM >60 01/15/2024 11:36 AM    LABGLOM 74 03/23/2022 09:31 AM    GLUCOSE 99 01/15/2024 11:36 AM    CALCIUM 9.4 01/15/2024 11:36 AM    BILITOT 0.5 08/24/2023 08:53 AM    ALKPHOS 121 08/24/2023 08:53 AM    ALKPHOS 90 03/23/2022 09:31 AM    AST 19 08/24/2023 08:53 AM    ALT 23 08/24/2023 08:53 AM       POC Tests:   Recent Labs     08/29/24  0917   POCK 3.8       Coags: No results found for: \"PROTIME\", \"INR\", \"APTT\"    HCG (If Applicable): No results found for: \"PREGTESTUR\", \"PREGSERUM\", \"HCG\", \"HCGQUANT\"     ABGs: No results found for: \"PHART\", \"PO2ART\", \"RJU1XBF\", \"MXA4YZH\", \"BEART\", \"H9OFQJGK\"     Type & Screen (If Applicable):  No results found for: \"LABABO\"    Drug/Infectious Status (If Applicable):  No results found for: \"HIV\", \"HEPCAB\"    COVID-19 Screening (If Applicable): No results found for: \"COVID19\"        Anesthesia Evaluation  Patient summary reviewed   no history of anesthetic complications:   Airway: Mallampati: II  TM distance: >3 FB   Neck ROM: full  Mouth opening: > = 3 FB   Dental: normal exam         Pulmonary:Negative Pulmonary ROS

## 2024-08-29 NOTE — ANESTHESIA PROCEDURE NOTES
Peripheral Block    Patient location during procedure: pre-op  Reason for block: post-op pain management and at surgeon's request  Start time: 8/29/2024 9:49 AM  End time: 8/29/2024 9:54 AM  Staffing  Performed: anesthesiologist   Anesthesiologist: Marine Teague MD  Performed by: Marine Teague MD  Authorized by: Marine Teague MD    Preanesthetic Checklist  Completed: patient identified, IV checked, site marked and risks and benefits discussed  Peripheral Block   Prep: ChloraPrep  Provider prep: mask  Patient monitoring: cardiac monitor, continuous pulse ox, frequent blood pressure checks, IV access, oxygen and responsive to questions  Block type: Sciatic  Popliteal  Laterality: left  Injection technique: single-shot  Guidance: nerve stimulator and ultrasound guided    Needle   Needle type: insulated echogenic nerve stimulator needle   Needle gauge: 21 G  Needle localization: nerve stimulator, ultrasound guidance and anatomical landmarks  Test dose: negative  Needle length: 4.  Assessment   Injection assessment: negative aspiration for heme, no paresthesia on injection, local visualized surrounding nerve on ultrasound and no intravascular symptoms  Slow fractionated injection: yes  Hemodynamics: stable  Outcomes: uncomplicated    Medications Administered  BUPivacaine 0.25%-EPINEPHrine injection 1:200000 - Perineural   10 mL - 8/29/2024 9:49:00 AM  BUPivacaine 0.5%-EPINEPHrine injection 1:200000 - Perineural   10 mL - 8/29/2024 9:49:00 AM  lidocaine 2 % (PF) injection - Perineural   5 mL - 8/29/2024 9:49:00 AM

## 2024-08-29 NOTE — H&P
Adhesive tape, and Lisinopril    Medications:    Prior to Admission medications    Medication Sig Start Date End Date Taking? Authorizing Provider   valsartan-hydroCHLOROthiazide (DIOVAN-HCT) 80-12.5 MG per tablet Take 1 tablet by mouth daily 8/23/24  Yes Kitty Santillan MD   Omega-3 Fatty Acids (FISH OIL PO) Take by mouth daily   Yes Elva Boland MD   MAGNESIUM PO Take by mouth daily   Yes Elva Boland MD   buPROPion (WELLBUTRIN XL) 300 MG extended release tablet Take 1 tablet by mouth daily  Patient taking differently: Take 150 mg by mouth daily 2/27/24  Yes Kitty Santillan MD   esomeprazole (NEXIUM) 40 MG delayed release capsule TAKE 1 CAPSULE BY MOUTH DAILY 30 MIN BEFORE 1ST MEAL 7/18/23  Yes Elva Boland MD   letrozole (FEMARA) 2.5 MG tablet TAKE 1 TABLET (2.5 MG TOTAL) BY MOUTH ONCE DAILY TAKE WITH OR WITHOUT FOOD. 6/15/22  Yes Elva Boland MD       The surgery is planned for the left  Achilles secondary reconstruction/repair with possible FHL transfer and Haglunds excision .        History and physical has been reviewed. The patient has been examined. There have been no significant clinical changes since the completion of the originally dated History and Physical.  Patient identified by surgeon; surgical site was confirmed by patient and surgeon.      The patient is here today for outpatient surgery. I have examined the patient, no changes are noted in the patient's medical status. Necessity for the procedure/care is still present and the history and physical above is current.  See the office notes for the full long term history of the problem.  Please see the recent office notes for the musculoskeletal examination.    Signed By: Janie Woodall, APRN - CNP     August 29, 2024 8:51 AM

## 2024-08-29 NOTE — ANESTHESIA PROCEDURE NOTES
Airway  Date/Time: 8/29/2024 11:13 AM  Urgency: elective    Airway not difficult    General Information and Staff    Patient location during procedure: OR  Resident/CRNA: Lisa Guevara APRN - CRNA  Performed: resident/CRNA   Performed by: Lisa Guevara APRN - CRNA  Authorized by: Marine Teague MD      Indications and Patient Condition  Indications for airway management: anesthesia  Spontaneous Ventilation: absent  Sedation level: deep  Preoxygenated: yes  Patient position: sniffing  MILS not maintained throughout  Mask difficulty assessment: vent by bag mask    Final Airway Details  Final airway type: endotracheal airway      Successful airway: ETT  Cuffed: yes   Successful intubation technique: direct laryngoscopy  Facilitating devices/methods: intubating stylet  Endotracheal tube insertion site: oral  Blade: Scot  Blade size: #4  ETT size (mm): 7.0  Cormack-Lehane Classification: grade I - full view of glottis  Placement verified by: chest auscultation and capnometry   Measured from: lips  Number of attempts at approach: 1  Ventilation between attempts: bag mask  Number of other approaches attempted: 0    no

## 2024-09-16 ENCOUNTER — OFFICE VISIT (OUTPATIENT)
Dept: ORTHOPEDIC SURGERY | Age: 59
End: 2024-09-16
Payer: COMMERCIAL

## 2024-09-16 DIAGNOSIS — M76.62 ACHILLES TENDINITIS OF BOTH LOWER EXTREMITIES: ICD-10-CM

## 2024-09-16 DIAGNOSIS — M76.61 ACHILLES TENDINITIS OF BOTH LOWER EXTREMITIES: ICD-10-CM

## 2024-09-16 DIAGNOSIS — M65.28 CALCIFIC ACHILLES TENDONITIS: Primary | ICD-10-CM

## 2024-09-16 PROCEDURE — M5002 MISC BOOT SOCK: HCPCS | Performed by: NURSE PRACTITIONER

## 2024-09-16 PROCEDURE — M5007 MISC HEEL LIFT: HCPCS | Performed by: NURSE PRACTITIONER

## 2024-09-16 PROCEDURE — 99024 POSTOP FOLLOW-UP VISIT: CPT | Performed by: NURSE PRACTITIONER

## 2024-09-16 PROCEDURE — L4361 PNEUMA/VAC WALK BOOT PRE OTS: HCPCS | Performed by: NURSE PRACTITIONER

## 2024-09-16 PROCEDURE — M5017 MISC EVENUP: HCPCS | Performed by: NURSE PRACTITIONER

## 2024-09-28 ENCOUNTER — PATIENT MESSAGE (OUTPATIENT)
Dept: FAMILY MEDICINE CLINIC | Facility: CLINIC | Age: 59
End: 2024-09-28

## 2024-10-01 RX ORDER — HYDROXYZINE HYDROCHLORIDE 25 MG/1
25 TABLET, FILM COATED ORAL 3 TIMES DAILY PRN
Qty: 30 TABLET | Refills: 0 | Status: SHIPPED | OUTPATIENT
Start: 2024-10-01 | End: 2024-10-31

## 2024-10-08 DIAGNOSIS — F41.9 ANXIETY: ICD-10-CM

## 2024-10-10 RX ORDER — BUPROPION HYDROCHLORIDE 300 MG/1
300 TABLET ORAL DAILY
Qty: 90 TABLET | Refills: 1 | Status: SHIPPED | OUTPATIENT
Start: 2024-10-10

## 2024-10-14 ENCOUNTER — OFFICE VISIT (OUTPATIENT)
Dept: ORTHOPEDIC SURGERY | Age: 59
End: 2024-10-14

## 2024-10-14 DIAGNOSIS — M76.62 ACHILLES TENDINITIS OF BOTH LOWER EXTREMITIES: Primary | ICD-10-CM

## 2024-10-14 DIAGNOSIS — M76.61 ACHILLES TENDINITIS OF BOTH LOWER EXTREMITIES: Primary | ICD-10-CM

## 2024-10-14 DIAGNOSIS — M65.28 CALCIFIC ACHILLES TENDONITIS: ICD-10-CM

## 2024-10-14 PROCEDURE — 99024 POSTOP FOLLOW-UP VISIT: CPT | Performed by: NURSE PRACTITIONER

## 2024-10-14 NOTE — PROGRESS NOTES
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  She is happy about progressing with her recovery.  She has been using holistic cream around the incision to aid with healing.  The patient reports this has been doing great.      Physical Examination: The incisional area is well-healed at this point postoperatively.  She does have palpable pulses and intact sensation to the foot.  She has no signs of DVT.  She has 5 of 5 strength to the Achilles repair.  There is noted swelling to the hindfoot.  She wiggles all toes effectively without pain.  Tibiotalar motion to the ankle joint is stiff today.        Imaging:   No imaging reviewed          Assessment:   Status post left secondary Achilles reconstruction with FHL tendon transfer to Marbella's incision.      Plan:   3 This is stable chronic illness/condition  Treatment at this time: Patient may now begin to wean out of the walker boot back in comfortable shoes as she feels she can tolerate.  They were given information today regarding a gel heel sleeve to help with any incisional sensitivities when wearing a shoe with the back.  The patient will also begin physical therapy, an order was given today as well as a list of locations for them to choose from.  They are also begin diligent exercises at home to increase joint mobility of the affected foot and ankle.  She may resume physical activities as tolerated excluding high-impact at this time.  Continued Epsom salt soaking was encouraged.  The patient will follow-up in approximately 6 weeks or sooner if needed.    Studies ordered: NO XR needed @ Next Visit    Weight-bearing status: WBAT

## 2024-10-16 ENCOUNTER — PATIENT MESSAGE (OUTPATIENT)
Dept: ORTHOPEDIC SURGERY | Age: 59
End: 2024-10-16

## 2024-10-16 DIAGNOSIS — M76.61 ACHILLES TENDINITIS OF BOTH LOWER EXTREMITIES: Primary | ICD-10-CM

## 2024-10-16 DIAGNOSIS — M76.62 ACHILLES TENDINITIS OF BOTH LOWER EXTREMITIES: Primary | ICD-10-CM

## 2024-10-21 ENCOUNTER — HOSPITAL ENCOUNTER (OUTPATIENT)
Dept: PHYSICAL THERAPY | Age: 59
Setting detail: RECURRING SERIES
Discharge: HOME | End: 2024-10-24
Payer: COMMERCIAL

## 2024-10-21 DIAGNOSIS — M25.675 STIFFNESS OF LEFT FOOT, NOT ELSEWHERE CLASSIFIED: ICD-10-CM

## 2024-10-21 DIAGNOSIS — M25.572 PAIN IN LEFT ANKLE AND JOINTS OF LEFT FOOT: Primary | ICD-10-CM

## 2024-10-21 PROCEDURE — 97110 THERAPEUTIC EXERCISES: CPT

## 2024-10-21 PROCEDURE — 97161 PT EVAL LOW COMPLEX 20 MIN: CPT

## 2024-10-21 ASSESSMENT — PAIN SCALES - GENERAL: PAINLEVEL_OUTOF10: 3

## 2024-10-21 NOTE — PROGRESS NOTES
indicated.   Date:  10/21 Date:   Date:     Activity/Exercise Parameters Parameters Parameters   Heel slides  For DF x 5      Heel raises  Seated x 5      Ankle PF  W/ gray TB x 5      Ankle DF  W/ BTB x 6                          MANUAL THERAPY: (00 minutes):   Joint mobilization, Soft tissue mobilization, and Manipulation was utilized and necessary because of the patient's restricted joint motion, painful spasm, loss of articular motion, and restricted motion of soft tissue.   MODALITIES:        Treatment/Session Summary:    Treatment Assessment:   Patient verbalized and demonstrated understanding of HEP.    Communication/Consultation:  None today  Equipment provided today:  HEP and Theraband  Recommendations/Intent for next treatment session: Next visit will focus on improving DF mobility as well as ankle strength.    >Total Treatment Billable Duration:  35 eval minutes, 10 TE min    Time In: 0900  Time Out: 0945    Link Benavidez PT       Access Code: [email or web address]  URL: [email or web address]  Date: 10/21/2024  Prepared by: Link Benavidez    Exercises  - Seated Heel Slide  - 1 x daily - 7 x weekly - 3 sets - 10 reps  - Seated Heel Raise  - 1 x daily - 7 x weekly - 3 sets - 10 reps  - Seated Ankle Dorsiflexion with Resistance  - 1 x daily - 7 x weekly - 3 sets - 10 reps  - Long Sitting Ankle Plantar Flexion with Resistance  - 1 x daily - 7 x weekly - 3 sets - 10 reps  - Long Sitting Calf Stretch with Strap  - 1 x daily - 7 x weekly - 3 sets - 10 reps  Charge Capture  Events  [facility] Portal  Appt Desk  Attendance Report     Future Appointments   Date Time Provider Department Center   10/24/2024 10:30 AM Link Benavidez PT [facility]   10/29/2024  8:15 AM Link Benavidez PT [facility]   10/31/2024  8:15 AM Link Benavidez PT [facility]   11/4/2024  8:15 AM Link Benavidez PT [facility]   11/5/2024  9:00 AM Sherman Wilson DO [facility]   11/7/2024  8:15 AM Link Benavidez PT [facility]

## 2024-10-21 NOTE — THERAPY EVALUATION
Sarah Alaniz  : 1965  Primary: Amarilis Long (Commercial)  Secondary:  [place] [address]  [address]  [facility] [address]  Phone: [phone]  Fax: [phone] Plan Frequency: 2 times a week  Plan of Care/Certification Expiration Date: 25        Plan of Care/Certification Expiration Date:  Plan of Care/Certification Expiration Date: 25    Frequency/Duration: Plan Frequency: 2 times a week      Time In/Out:   Time In: 0900  Time Out: 0945      PT Visit Info:    Total # of Visits to Date: 1      Visit Count:  1                OUTPATIENT PHYSICAL THERAPY:             Initial Assessment 10/21/2024               Episode (L achilles secondary reconstruciton)         Treatment Diagnosis:     Pain in left ankle and joints of left foot  Stiffness of left foot, not elsewhere classified  Medical/Referring Diagnosis:    Achilles tendinitis of both lower extremities [M76.61, M76.62]    Referring Physician:  Janie Woodall APRN - CNP MD Orders:  PT Eval and Treat   Return MD Appt:  24  Date of Onset:  Onset Date: 24 (DOS)     Allergies:  Latex, Sulfa antibiotics, Adhesive tape, and Lisinopril  Restrictions/Precautions:    Weight Bearing Status: WBAT      Medications Last Reviewed:  10/21/2024     SUBJECTIVE   History of Injury/Illness (Reason for Referral):  Patient underwent a left secondary reconstruction of achilles tendon with debridement and left flexor hallucis longus tendon transfer and haglunds deformity excision in the calcaneus which was performed on 24.  Prior to this surgery she had been dealing with L achilles pain on and off for the past 9 years now however throughout the past year or two the pain was consistent and debilitating to the point where she felt the need to seek surgical intervention.  Prior to the surgery she was fairly active and walked for exercise for about 3-4 miles.  Recovery has been good still ambulating with the CAM

## 2024-10-24 ENCOUNTER — HOSPITAL ENCOUNTER (OUTPATIENT)
Dept: PHYSICAL THERAPY | Age: 59
Setting detail: RECURRING SERIES
Discharge: HOME | End: 2024-10-27
Payer: COMMERCIAL

## 2024-10-24 PROCEDURE — 97110 THERAPEUTIC EXERCISES: CPT

## 2024-10-24 PROCEDURE — 97140 MANUAL THERAPY 1/> REGIONS: CPT

## 2024-10-24 ASSESSMENT — PAIN SCALES - GENERAL: PAINLEVEL_OUTOF10: 1

## 2024-10-24 NOTE — PROGRESS NOTES
Sarah Alaniz  : 1965  Primary: Amarilis Long (Commercial)  Secondary:  [facility]  [address]  [address]  [facility] [address]  Phone: [phone]  Fax: [phone] Plan Frequency: 2 times a week    Plan of Care/Certification Expiration Date: 25        Plan of Care/Certification Expiration Date:  Plan of Care/Certification Expiration Date: 25    Frequency/Duration:   Plan Frequency: 2 times a week      Time In/Out:   Time In: 1030  Time Out: 1115      PT Visit Info:    Total # of Visits to Date: 2      Visit Count:  2    OUTPATIENT PHYSICAL THERAPY:   Treatment Note 10/24/2024       Episode  (L achilles secondary reconstruciton)               Treatment Diagnosis:    No data found  Medical/Referring Diagnosis:    Achilles tendinitis of both lower extremities [M76.61, M76.62]    Referring Physician:  Janie Woodall APRN - CNP MD Orders:  PT Eval and Treat   Return MD Appt:  24   Date of Onset:  Onset Date: 24 (DOS)     Allergies:   Latex, Sulfa antibiotics, Adhesive tape, and Lisinopril  Restrictions/Precautions:   Weight Bearing Status: WBAT      Interventions Planned (Treatment may consist of any combination of the following):     See Assessment Note    Subjective Comments:   Patient presents today with regular walking shoes that were recommended as well as unilateral crutch on unaffected side.  States that she is slightly irritated today due to prolonged standing and walking yesterday.    Initial Pain Level::     1/10  Post Session Pain Level:       10  Medications Last Reviewed:  10/24/2024  Updated Objective Findings:  None Today  Treatment   THERAPEUTIC EXERCISE: (30 minutes):    Exercises per grid below to improve mobility, strength, and balance.  Required moderate visual, verbal, manual, and tactile cues to promote proper body alignment, promote proper body posture, and promote proper body mechanics.  Progressed resistance, range, and

## 2024-10-29 ENCOUNTER — HOSPITAL ENCOUNTER (OUTPATIENT)
Dept: PHYSICAL THERAPY | Age: 59
Setting detail: RECURRING SERIES
Discharge: HOME | End: 2024-11-01
Payer: COMMERCIAL

## 2024-10-29 PROCEDURE — 97110 THERAPEUTIC EXERCISES: CPT

## 2024-10-29 ASSESSMENT — PAIN SCALES - GENERAL: PAINLEVEL_OUTOF10: 1

## 2024-10-29 NOTE — PROGRESS NOTES
range, and repetitions as indicated.   Date:  10/21 Date:  10/24 Date:  10/29   Activity/Exercise Parameters Parameters Parameters   Heel slides  For DF x 5  8 x 10 sec holds  5 x 15'' holds    Heel raises  Seated x 5  Seated 2 x 10     Ankle PF  W/ gray TB x 5  W/ gray TB 2  x 10 W/ thick red band 2 x 10, eccentric control    Ankle DF  W/ BTB x 6  Seated w/ BTB 2 x 10     Calf stretch   Gentle 8 x 10 sec holds  Gentle 8 x 10 sec holds w/ strap   Standing against wall gentle 5 x 10 sec holds    Ankle pumps   X 10    Ankle circles   X 20  X 20    NuStep    L1 x 6 min for DF mobility    Step overs over dean    Emphasis on initial contact x 20    Gait mechanics    Utilizing unilateral walking stick one lap around clinic, heel lift one lap      MANUAL THERAPY: (10 minutes):   Joint mobilization, Soft tissue mobilization, and Manipulation was utilized and necessary because of the patient's restricted joint motion, painful spasm, loss of articular motion, and restricted motion of soft tissue.   Manual Therapy Technique and Location Date:  10/24            Gentle STM to achilles tendon w/ active DF   Grade II calcaneal distraction in supine   PROM of ankle within surgical protocol         MODALITIES:        Treatment/Session Summary:    Treatment Assessment:   Primarily focused on gait mechanics throughout todays session, had increased difficulty at first with initial contact of heel strike while ambulating.  Once heel lift was inserted patient demonstrated improvement with consistency.     Communication/Consultation:  None today  Equipment provided today:  HEP, Theraband, and heel lift   Recommendations/Intent for next treatment session: Next visit will focus on improving DF mobility as well as ankle strength.    >Total Treatment Billable Duration:  00 manual therapy minutes, 40 TE min    Time In: 0815  Time Out: 0900    Link Benavidez PT       Access Code: [email or web address]  URL: [email or web address]  Date:

## 2024-10-31 ENCOUNTER — HOSPITAL ENCOUNTER (OUTPATIENT)
Dept: PHYSICAL THERAPY | Age: 59
Setting detail: RECURRING SERIES
Discharge: HOME | End: 2024-11-03
Payer: COMMERCIAL

## 2024-10-31 PROCEDURE — 97140 MANUAL THERAPY 1/> REGIONS: CPT

## 2024-10-31 PROCEDURE — 97110 THERAPEUTIC EXERCISES: CPT

## 2024-10-31 ASSESSMENT — PAIN SCALES - GENERAL: PAINLEVEL_OUTOF10: 1

## 2024-10-31 NOTE — PROGRESS NOTES
Sarah Alaniz  : 1965  Primary: Amarilis Long (Commercial)  Secondary:  [facility]  [address]  [address]  [facility] [address]  Phone: [phone]  Fax: [phone] Plan Frequency: 2 times a week    Plan of Care/Certification Expiration Date: 25        Plan of Care/Certification Expiration Date:  Plan of Care/Certification Expiration Date: 25    Frequency/Duration:   Plan Frequency: 2 times a week      Time In/Out:   Time In: 0815  Time Out: 0900      PT Visit Info:    Total # of Visits to Date: 4      Visit Count:  4    OUTPATIENT PHYSICAL THERAPY:   Treatment Note 10/31/2024       Episode  (L achilles secondary reconstruciton)               Treatment Diagnosis:    Pain in left ankle and joints of left foot  Stiffness of left foot, not elsewhere classified  Medical/Referring Diagnosis:    Achilles tendinitis of both lower extremities [M76.61, M76.62]    Referring Physician:  Janie Woodall APRN - CNP MD Orders:  PT Eval and Treat   Return MD Appt:  24   Date of Onset:  Onset Date: 24 (DOS)     Allergies:   Latex, Sulfa antibiotics, Adhesive tape, and Lisinopril  Restrictions/Precautions:   Weight Bearing Status: WBAT      Interventions Planned (Treatment may consist of any combination of the following):     See Assessment Note    Subjective Comments:   Patient states that she did a good amount of house work yesterday which has caused her achilles to be bit irritated today.     Initial Pain Level::     110  Post Session Pain Level:       2/10  Medications Last Reviewed:  10/31/2024  Updated Objective Findings:  None Today  Treatment   THERAPEUTIC EXERCISE: (30 minutes):    Exercises per grid below to improve mobility, strength, and balance.  Required moderate visual, verbal, manual, and tactile cues to promote proper body alignment, promote proper body posture, and promote proper body mechanics.  Progressed resistance, range, and repetitions as

## 2024-11-04 ENCOUNTER — HOSPITAL ENCOUNTER (OUTPATIENT)
Dept: PHYSICAL THERAPY | Age: 59
Setting detail: RECURRING SERIES
Discharge: HOME | End: 2024-11-07
Payer: COMMERCIAL

## 2024-11-04 PROCEDURE — 97110 THERAPEUTIC EXERCISES: CPT

## 2024-11-04 PROCEDURE — 97140 MANUAL THERAPY 1/> REGIONS: CPT

## 2024-11-04 ASSESSMENT — PAIN SCALES - GENERAL: PAINLEVEL_OUTOF10: 0

## 2024-11-04 NOTE — PROGRESS NOTES
within surgical protocol  Gentle STM to achilles tendon w/ active DF   Grade II calcaneal distraction in supine   PROM of ankle within surgical protocol       MODALITIES:        Treatment/Session Summary:    Treatment Assessment:   Patient continues to progress fairly well, demonstrated appropriate gait mechanics throughout the entirety of the session as well as being able to perform SL heel raises on the shuttle press without any increase in pain.  Did report increase in soreness/mild discomfort at end of session.    Communication/Consultation:  None today  Equipment provided today:  HEP, Theraband, and heel lift   Recommendations/Intent for next treatment session: Next visit will focus on improving DF mobility as well as ankle strength.    >Total Treatment Billable Duration:  10 manual therapy minutes, 35 TE min    Time In: 0815  Time Out: 0900    Link Benavidez PT       Access Code: [email or web address]  URL: [email or web address]  Date: 10/21/2024  Prepared by: Link Benavidez    Exercises  - Seated Heel Slide  - 1 x daily - 7 x weekly - 3 sets - 10 reps  - Seated Heel Raise  - 1 x daily - 7 x weekly - 3 sets - 10 reps  - Seated Ankle Dorsiflexion with Resistance  - 1 x daily - 7 x weekly - 3 sets - 10 reps  - Long Sitting Ankle Plantar Flexion with Resistance  - 1 x daily - 7 x weekly - 3 sets - 10 reps  - Long Sitting Calf Stretch with Strap  - 1 x daily - 7 x weekly - 3 sets - 10 reps  Charge Capture  Events  [email or web address] Portal  Appt Desk  Attendance Report     Future Appointments   Date Time Provider Department Center   11/5/2024  8:00 AM Duglas Jc DO [facility]   11/7/2024  8:15 AM Link Benavidez PT [facility]   11/12/2024  8:15 AM Link Benavidez PT [facility]   11/14/2024  8:15 AM Link Benavidez PT [facility]   11/25/2024  9:00 AM Janie Woodall APRN - CNP [facility]

## 2024-11-05 ENCOUNTER — OFFICE VISIT (OUTPATIENT)
Age: 59
End: 2024-11-05
Payer: COMMERCIAL

## 2024-11-05 VITALS
SYSTOLIC BLOOD PRESSURE: 110 MMHG | DIASTOLIC BLOOD PRESSURE: 80 MMHG | WEIGHT: 210.4 LBS | BODY MASS INDEX: 33.02 KG/M2 | HEIGHT: 67 IN | HEART RATE: 76 BPM

## 2024-11-05 DIAGNOSIS — R07.89 OTHER CHEST PAIN: Primary | ICD-10-CM

## 2024-11-05 PROCEDURE — 3079F DIAST BP 80-89 MM HG: CPT | Performed by: INTERNAL MEDICINE

## 2024-11-05 PROCEDURE — 3074F SYST BP LT 130 MM HG: CPT | Performed by: INTERNAL MEDICINE

## 2024-11-05 PROCEDURE — 99204 OFFICE O/P NEW MOD 45 MIN: CPT | Performed by: INTERNAL MEDICINE

## 2024-11-05 ASSESSMENT — ENCOUNTER SYMPTOMS: SHORTNESS OF BREATH: 0

## 2024-11-05 NOTE — PROGRESS NOTES
performed by Alpesh Taylor III, MD at [facility]    BREAST BIOPSY Left 08/06/2019    BREAST BIOPSY Left 9/18/2019    LEFT BREAST NEEDLE LOCALIZED BIOPSY  performed by Omari Munoz MD at [facility]    BREAST LUMPECTOMY Left     CHOLECYSTECTOMY      COLONOSCOPY      ESOPHAGOGASTRODUODENOSCOPY      FOOT SURGERY Left 8/29/2024    Left possible flexor hallucis longus transfer performed by Alpesh Taylor III, MD at [facility]    FOOT SURGERY Left 8/29/2024    Left Haglunds excision performed by Alpesh Taylor III, MD at [facility]    HYSTERECTOMY (CERVIX STATUS UNKNOWN)  1998    US BREAST BIOPSY W LOC DEVICE 1ST LESION LEFT Left 08/06/2019    US BREAST NEEDLE BIOPSY LEFT 8/6/2019 [facility] [place] [facility]    US GUIDED NEEDLE LOC OF LEFT BREAST Left 09/18/2019    US GUIDED NEEDLE LOC OF LEFT BREAST 9/18/2019 [facility] [place] [facility]    US LYMPH NODE BIOPSY  08/19/2019    US LYMPH NODE BIOPSY 8/19/2019 [facility] [place] [facility]    WRIST SURGERY Right 7/8/2024    WRIST DEQUERVAINS release on the right performed by Azucena Bernal MD at [facility]     Family History   Problem Relation Age of Onset    Cancer Father         prostate    Heart Disease Father     Dementia Mother      Social History     Tobacco Use    Smoking status: Never    Smokeless tobacco: Never   Substance Use Topics    Alcohol use: Never       ROS:    Review of Systems   Cardiovascular:  Positive for palpitations. Negative for chest pain, dyspnea on exertion and syncope.   Respiratory:  Negative for shortness of breath.    Musculoskeletal:  Positive for neck pain.   Neurological:  Negative for light-headedness.          PHYSICAL EXAM:   /80   Pulse 76   Ht 1.702 m (5' 7\")   Wt 95.4 kg (210 lb 6.4 oz)   BMI 32.95 kg/m²      Physical Exam  Constitutional:       General: She is not in acute distress.     Appearance: Normal appearance.   HENT:      Head: Normocephalic and atraumatic.      Mouth/Throat:      Mouth: Mucous membranes are moist.   Eyes:      General: No scleral

## 2024-11-07 ENCOUNTER — HOSPITAL ENCOUNTER (OUTPATIENT)
Dept: PHYSICAL THERAPY | Age: 59
Setting detail: RECURRING SERIES
Discharge: HOME | End: 2024-11-10
Payer: COMMERCIAL

## 2024-11-07 PROCEDURE — 97110 THERAPEUTIC EXERCISES: CPT

## 2024-11-07 PROCEDURE — 97140 MANUAL THERAPY 1/> REGIONS: CPT

## 2024-11-07 ASSESSMENT — PAIN SCALES - GENERAL: PAINLEVEL_OUTOF10: 0

## 2024-11-07 NOTE — PROGRESS NOTES
repetitions as indicated.   Date:  10/21 Date:  10/24 Date:  10/29 Date:   10/31 Date:   11/4 Date:  11/7   Activity/Exercise Parameters Parameters Parameters      Heel slides  For DF x 5  8 x 10 sec holds  5 x 15'' holds  5 x 15'' holds      Heel raises  Seated x 5  Seated 2 x 10   Seated x 10, x 10 w/ 18# med ball  Seated 2 x 12 w/ 24# med ball  Seated 2 x 12 w/ 24# med ball    Ankle PF  W/ gray TB x 5  W/ gray TB 2  x 10 W/ thick red band 2 x 10, eccentric control       Ankle DF  W/ BTB x 6  Seated w/ BTB 2 x 10    Back against wall 2 x 10     Calf stretch   Gentle 8 x 10 sec holds  Gentle 8 x 10 sec holds w/ strap   Standing against wall gentle 5 x 10 sec holds  Gentle 8 x 10 sec holds w/ strap   Standing against wall gentle 5 x 10 sec holds  Gentle 8 x 10 sec holds w/ strap  Gentle 8 x 10 sec holds w/ strap    Ankle pumps   X 10       Ankle circles   X 20  X 20  X 10   X 20    NuStep    L1 x 6 min for DF mobility  L1.5 x 6 min for DF Mobility  L2 x 6 min for DF mobility  L3 x 6 min for DF mobility    Step overs over dean    Emphasis on initial contact x 20  In front of mirror w/ walking stick forward and backwards 10 x 40 ft      Gait mechanics    Utilizing unilateral walking stick one lap around clinic, heel lift one lap       Weight shift     On airex pad P-A shift x 10  In front of wall no shoes eccentric control x 10     Shuttle press      50# SL small range, avoiding excessive stretch 2 x 10 SL heel raises  62# BLE x 15, SL 37# 2 x 10      MANUAL THERAPY: (10 minutes):   Joint mobilization, Soft tissue mobilization, and Manipulation was utilized and necessary because of the patient's restricted joint motion, painful spasm, loss of articular motion, and restricted motion of soft tissue.   Manual Therapy Technique and Location Date:  10/24 Date:   10/31/24 Date:   11/4/24 Date:   11/7/24           Gentle STM to achilles tendon w/ active DF   Grade II calcaneal distraction in supine   PROM of ankle within

## 2024-11-12 ENCOUNTER — HOSPITAL ENCOUNTER (OUTPATIENT)
Dept: PHYSICAL THERAPY | Age: 59
Setting detail: RECURRING SERIES
Discharge: HOME | End: 2024-11-15
Payer: COMMERCIAL

## 2024-11-12 PROCEDURE — 97110 THERAPEUTIC EXERCISES: CPT

## 2024-11-12 PROCEDURE — 97140 MANUAL THERAPY 1/> REGIONS: CPT

## 2024-11-12 ASSESSMENT — PAIN SCALES - GENERAL: PAINLEVEL_OUTOF10: 1

## 2024-11-12 NOTE — PROGRESS NOTES
Future Appointments   Date Time Provider Department Center   11/14/2024  8:15 AM Link Benavidez, ANSHU [facility]   11/25/2024  9:00 AM Janie Woodall APRN - CNP [facility]   1/10/2025  8:15 AM [facility] STRESS TEST [facility]

## 2024-11-14 ENCOUNTER — HOSPITAL ENCOUNTER (OUTPATIENT)
Dept: PHYSICAL THERAPY | Age: 59
Setting detail: RECURRING SERIES
Discharge: HOME | End: 2024-11-17
Payer: COMMERCIAL

## 2024-11-14 PROCEDURE — 97110 THERAPEUTIC EXERCISES: CPT

## 2024-11-14 PROCEDURE — 97140 MANUAL THERAPY 1/> REGIONS: CPT

## 2024-11-14 ASSESSMENT — PAIN SCALES - GENERAL: PAINLEVEL_OUTOF10: 1

## 2024-11-14 NOTE — PROGRESS NOTES
Sarah Alaniz  : 1965  Primary: Amarilis Long (Commercial)  Secondary:  O [facility]  [address]  [address] [place] [address]  Phone: [phone]  Fax: [phone] Plan Frequency: 2 times a week    Plan of Care/Certification Expiration Date: 25        Plan of Care/Certification Expiration Date:  Plan of Care/Certification Expiration Date: 25    Frequency/Duration:   Plan Frequency: 2 times a week      Time In/Out:   Time In: 0815  Time Out: 0900      PT Visit Info:    Total # of Visits to Date: 8      Visit Count:  8    OUTPATIENT PHYSICAL THERAPY:   Treatment Note 2024       Episode  (L achilles secondary reconstruciton)               Treatment Diagnosis:    Pain in left ankle and joints of left foot  Stiffness of left foot, not elsewhere classified  Medical/Referring Diagnosis:    Achilles tendinitis of both lower extremities [M76.61, M76.62]    Referring Physician:  Janie Woodall APRN - CNP MD Orders:  PT Eval and Treat   Return MD Appt:  24   Date of Onset:  Onset Date: 24 (DOS)     Allergies:   Latex, Sulfa antibiotics, Adhesive tape, and Lisinopril  Restrictions/Precautions:   Weight Bearing Status: WBAT      Interventions Planned (Treatment may consist of any combination of the following):     See Assessment Note    Subjective Comments:   Patient states that she is doing good, feels like her walking mechanics are fairly normal today.    Initial Pain Level::     1/10  Post Session Pain Level:        /10  Medications Last Reviewed:  2024  Updated Objective Findings:  None Today  Treatment   THERAPEUTIC EXERCISE: (35 minutes):    Exercises per grid below to improve mobility, strength, and balance.  Required moderate visual, verbal, manual, and tactile cues to promote proper body alignment, promote proper body posture, and promote proper body mechanics.  Progressed resistance, range, and repetitions as indicated.   Date:  10/21

## 2024-11-19 ENCOUNTER — APPOINTMENT (OUTPATIENT)
Dept: PHYSICAL THERAPY | Age: 59
End: 2024-11-19
Payer: COMMERCIAL

## 2024-11-20 ENCOUNTER — HOSPITAL ENCOUNTER (OUTPATIENT)
Dept: PHYSICAL THERAPY | Age: 59
Setting detail: RECURRING SERIES
Discharge: HOME | End: 2024-11-23
Payer: COMMERCIAL

## 2024-11-20 PROCEDURE — 97140 MANUAL THERAPY 1/> REGIONS: CPT

## 2024-11-20 PROCEDURE — 97110 THERAPEUTIC EXERCISES: CPT

## 2024-11-20 ASSESSMENT — PAIN SCALES - GENERAL: PAINLEVEL_OUTOF10: 1

## 2024-11-20 NOTE — PROGRESS NOTES
Sarah Alaniz  : 1965  Primary: Amarilis Long (Commercial)  Secondary:  O [facility]  [address]  [address]  [facility] [address]  Phone: [phone]  Fax: [phone] Plan Frequency: 2 times a week    Plan of Care/Certification Expiration Date: 25        Plan of Care/Certification Expiration Date:  Plan of Care/Certification Expiration Date: 25    Frequency/Duration:   Plan Frequency: 2 times a week      Time In/Out:   Time In: 1115  Time Out: 1200      PT Visit Info:    Total # of Visits to Date: 9      Visit Count:  9    OUTPATIENT PHYSICAL THERAPY:   Treatment Note 2024       Episode  (L achilles secondary reconstruciton)               Treatment Diagnosis:    Pain in left ankle and joints of left foot  Stiffness of left foot, not elsewhere classified  Medical/Referring Diagnosis:    Achilles tendinitis of both lower extremities [M76.61, M76.62]    Referring Physician:  Janie Woodall APRN - CNP MD Orders:  PT Eval and Treat   Return MD Appt:  24   Date of Onset:  Onset Date: 24 (DOS)     Allergies:   Latex, Sulfa antibiotics, Adhesive tape, and Lisinopril  Restrictions/Precautions:   Weight Bearing Status: WBAT      Interventions Planned (Treatment may consist of any combination of the following):     See Assessment Note    Subjective Comments:   Patient reports that she finally got the orthotics for her posterior tib pain.  States that it has helped out a tremendous amount.     Initial Pain Level::     1/10  Post Session Pain Level:       2/10  Medications Last Reviewed:  2024  Updated Objective Findings:  None Today  Treatment   THERAPEUTIC EXERCISE: (35 minutes):    Exercises per grid below to improve mobility, strength, and balance.  Required moderate visual, verbal, manual, and tactile cues to promote proper body alignment, promote proper body posture, and promote proper body mechanics.  Progressed resistance, range, and repetitions

## 2024-11-21 ENCOUNTER — APPOINTMENT (OUTPATIENT)
Dept: PHYSICAL THERAPY | Age: 59
End: 2024-11-21
Payer: COMMERCIAL

## 2024-11-25 ENCOUNTER — OFFICE VISIT (OUTPATIENT)
Dept: ORTHOPEDIC SURGERY | Age: 59
End: 2024-11-25

## 2024-11-25 ENCOUNTER — HOSPITAL ENCOUNTER (OUTPATIENT)
Dept: PHYSICAL THERAPY | Age: 59
Setting detail: RECURRING SERIES
Discharge: HOME | End: 2024-11-28
Payer: COMMERCIAL

## 2024-11-25 DIAGNOSIS — M76.62 ACHILLES TENDINITIS, LEFT LEG: Primary | ICD-10-CM

## 2024-11-25 DIAGNOSIS — M65.28 CALCIFIC ACHILLES TENDONITIS: ICD-10-CM

## 2024-11-25 PROCEDURE — 97140 MANUAL THERAPY 1/> REGIONS: CPT

## 2024-11-25 PROCEDURE — 99024 POSTOP FOLLOW-UP VISIT: CPT | Performed by: NURSE PRACTITIONER

## 2024-11-25 PROCEDURE — 97110 THERAPEUTIC EXERCISES: CPT

## 2024-11-25 ASSESSMENT — PAIN SCALES - GENERAL: PAINLEVEL_OUTOF10: 1

## 2024-11-25 NOTE — PROGRESS NOTES
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  She has been continuing with therapy and has received benefit from this.  She still has some discomfort to the inside of her ankle with certain motions.      Physical Examination: All incisional areas look great and well-healed.  There is still mild swelling to the hindfoot.  She has palpable pulses and intact sensation to the foot.  She has 5 of 5 strength to the Achilles repair.  She is able to do a double leg toe raise effectively with some discomfort to the medial side of the ankle over the peroneal tendons.  She is not physically able to do a single-leg toe raise just yet.        Imaging:   No imaging reviewed          Assessment:   Status post left secondary Achilles reconstruction with FHL tendon transfer and Marbella's incision.  We did discuss the potential of having some peroneal tendinitis due to overcompensation.  She may take anti-inflammatories as she can tolerate and continue to strengthen the remainder of the extremity to help with this.      Plan:   3 This is stable chronic illness/condition  Treatment at this time: Time with no intervention, Physical Therapy, and there are no longer any restrictions on her level of activity she may do as she can tolerate.  She will follow-up with us on a as needed basis.  Studies ordered: NO XR needed @ Next Visit    Weight-bearing status: WBAT        Return to work/work restrictions: none  No medications given

## 2024-11-25 NOTE — PROGRESS NOTES
Communication/Consultation:  None today  Equipment provided today:  HEP, Theraband, and heel lift   Recommendations/Intent for next treatment session: Next visit will focus on improving DF mobility as well as ankle strength.    >Total Treatment Billable Duration:  10 manual therapy minutes, 35 TE min    Time In: 1115  Time Out: 1200    Link Benavidez PT       Access Code: [email or web address]  URL: [email or web address]  Date: 10/21/2024  Prepared by: Link Benavidez    Exercises  - Seated Heel Slide  - 1 x daily - 7 x weekly - 3 sets - 10 reps  - Seated Heel Raise  - 1 x daily - 7 x weekly - 3 sets - 10 reps  - Seated Ankle Dorsiflexion with Resistance  - 1 x daily - 7 x weekly - 3 sets - 10 reps  - Long Sitting Ankle Plantar Flexion with Resistance  - 1 x daily - 7 x weekly - 3 sets - 10 reps  - Long Sitting Calf Stretch with Strap  - 1 x daily - 7 x weekly - 3 sets - 10 reps  Charge Capture  Events  [employer] Portal  Appt Desk  Attendance Report     Future Appointments   Date Time Provider Department Center   11/27/2024  1:00 PM Link Benavidez PT [facility]   12/3/2024  9:45 AM Link Benavidez PT [facility]   12/5/2024  9:00 AM Link Benavidez PT [facility]   1/10/2025  8:15 AM [facility] STRESS TEST [facility]

## 2024-11-27 ENCOUNTER — HOSPITAL ENCOUNTER (OUTPATIENT)
Dept: PHYSICAL THERAPY | Age: 59
Setting detail: RECURRING SERIES
Discharge: HOME | End: 2024-11-30
Payer: COMMERCIAL

## 2024-11-27 PROCEDURE — 97110 THERAPEUTIC EXERCISES: CPT

## 2024-11-27 ASSESSMENT — PAIN SCALES - GENERAL: PAINLEVEL_OUTOF10: 1

## 2024-11-27 NOTE — PROGRESS NOTES
Sarah Alaniz  : 1965  Primary: Amarilis Long (Commercial)  Secondary:  O [facility]  [address]  [address]  [facility] [address]  Phone: [phone]  Fax: [phone] Plan Frequency: 2 times a week    Plan of Care/Certification Expiration Date: 25        Plan of Care/Certification Expiration Date:  Plan of Care/Certification Expiration Date: 25    Frequency/Duration:   Plan Frequency: 2 times a week      Time In/Out:   Time In: 1300  Time Out: 1345      PT Visit Info:    Total # of Visits to Date: 11      Visit Count:  11    OUTPATIENT PHYSICAL THERAPY:   Treatment Note 2024       Episode  (L achilles secondary reconstruciton)               Treatment Diagnosis:    Pain in left ankle and joints of left foot  Stiffness of left foot, not elsewhere classified  Medical/Referring Diagnosis:    Achilles tendinitis of both lower extremities [M76.61, M76.62]    Referring Physician:  Janie Woodall APRN - CNP MD Orders:  PT Eval and Treat   Return MD Appt:  24   Date of Onset:  Onset Date: 24 (DOS)     Allergies:   Latex, Sulfa antibiotics, Adhesive tape, and Lisinopril  Restrictions/Precautions:   Weight Bearing Status: WBAT      Interventions Planned (Treatment may consist of any combination of the following):     See Assessment Note    Subjective Comments:   Patient reports that she is having increased soreness overall in her achilles. Posterior tib pain comes and goes.    Initial Pain Level::     10  Post Session Pain Level:       1/10  Medications Last Reviewed:  2024  Updated Objective Findings:  None Today  Treatment   THERAPEUTIC EXERCISE: (45 minutes):    Exercises per grid below to improve mobility, strength, and balance.  Required moderate visual, verbal, manual, and tactile cues to promote proper body alignment, promote proper body posture, and promote proper body mechanics.  Progressed resistance, range, and repetitions as indicated.

## 2024-12-13 ENCOUNTER — OFFICE VISIT (OUTPATIENT)
Dept: ORTHOPEDIC SURGERY | Age: 59
End: 2024-12-13
Payer: COMMERCIAL

## 2024-12-13 DIAGNOSIS — M17.12 PRIMARY OSTEOARTHRITIS OF LEFT KNEE: ICD-10-CM

## 2024-12-13 DIAGNOSIS — M17.11 PRIMARY OSTEOARTHRITIS OF RIGHT KNEE: Primary | ICD-10-CM

## 2024-12-13 PROCEDURE — 99213 OFFICE O/P EST LOW 20 MIN: CPT | Performed by: PHYSICIAN ASSISTANT

## 2024-12-13 NOTE — PROGRESS NOTES
thick tissue that cushions the joints. We discussed that osteoarthritis never completely resolves on its own and symptoms including pain, stiffness, and swelling may wax and wane as the condition progresses. she understands that conservative treatments typically include activity modification, NSAIDs and physical therapy.  Oral and/or steroid injections into the joint could be considered in resistant scenarios. Viscosupplementation injections may also be useful as a temporary cartilage replacement in certain patients. I advised to avoid any prolonged bedrest and to try to maintain ADLs as much as possible. The patient was counseled to follow up with me should she develop any worsening symptoms that begin to limit activities of daily living.       I have recommended repeat Viscosupplementation. This will be scheduled and arranged.    Keep scheduled appointment for injection at 6 months.    We discussed conservative management including tylenol, ice, activity modification, and additional shots or PT in the future.  For now they will function as tolerated.  Avoid activities that cause pain and swelling.  We discussed the possibility that this could eventually progress to a surgical issue, if symptoms become more limiting.

## 2025-01-16 SDOH — ECONOMIC STABILITY: TRANSPORTATION INSECURITY
IN THE PAST 12 MONTHS, HAS THE LACK OF TRANSPORTATION KEPT YOU FROM MEDICAL APPOINTMENTS OR FROM GETTING MEDICATIONS?: NO

## 2025-01-16 SDOH — ECONOMIC STABILITY: FOOD INSECURITY: WITHIN THE PAST 12 MONTHS, THE FOOD YOU BOUGHT JUST DIDN'T LAST AND YOU DIDN'T HAVE MONEY TO GET MORE.: NEVER TRUE

## 2025-01-16 SDOH — ECONOMIC STABILITY: INCOME INSECURITY: IN THE LAST 12 MONTHS, WAS THERE A TIME WHEN YOU WERE NOT ABLE TO PAY THE MORTGAGE OR RENT ON TIME?: NO

## 2025-01-16 SDOH — ECONOMIC STABILITY: FOOD INSECURITY: WITHIN THE PAST 12 MONTHS, YOU WORRIED THAT YOUR FOOD WOULD RUN OUT BEFORE YOU GOT MONEY TO BUY MORE.: NEVER TRUE

## 2025-01-16 SDOH — ECONOMIC STABILITY: TRANSPORTATION INSECURITY
IN THE PAST 12 MONTHS, HAS LACK OF TRANSPORTATION KEPT YOU FROM MEETINGS, WORK, OR FROM GETTING THINGS NEEDED FOR DAILY LIVING?: NO

## 2025-01-17 ENCOUNTER — TELEMEDICINE (OUTPATIENT)
Dept: FAMILY MEDICINE CLINIC | Facility: CLINIC | Age: 60
End: 2025-01-17
Payer: COMMERCIAL

## 2025-01-17 DIAGNOSIS — I10 ESSENTIAL HYPERTENSION: ICD-10-CM

## 2025-01-17 DIAGNOSIS — F41.9 ANXIETY: ICD-10-CM

## 2025-01-17 PROCEDURE — 99214 OFFICE O/P EST MOD 30 MIN: CPT | Performed by: FAMILY MEDICINE

## 2025-01-17 RX ORDER — ESCITALOPRAM OXALATE 10 MG/1
10 TABLET ORAL DAILY
Qty: 90 TABLET | Refills: 0 | Status: SHIPPED | OUTPATIENT
Start: 2025-01-17

## 2025-01-17 RX ORDER — FAMOTIDINE 40 MG/1
40 TABLET, FILM COATED ORAL NIGHTLY
COMMUNITY
Start: 2025-01-01

## 2025-01-17 RX ORDER — VALSARTAN AND HYDROCHLOROTHIAZIDE 80; 12.5 MG/1; MG/1
1 TABLET, FILM COATED ORAL DAILY
Qty: 90 TABLET | Refills: 1 | Status: SHIPPED | OUTPATIENT
Start: 2025-01-17

## 2025-01-17 RX ORDER — BUPROPION HYDROCHLORIDE 150 MG/1
150 TABLET ORAL DAILY
Qty: 90 TABLET | Refills: 1 | Status: SHIPPED | OUTPATIENT
Start: 2025-01-17

## 2025-01-17 RX ORDER — BUPROPION HYDROCHLORIDE 300 MG/1
300 TABLET ORAL DAILY
Qty: 90 TABLET | Refills: 1 | Status: SHIPPED | OUTPATIENT
Start: 2025-01-17 | End: 2025-01-17

## 2025-01-17 SDOH — ECONOMIC STABILITY: FOOD INSECURITY: WITHIN THE PAST 12 MONTHS, YOU WORRIED THAT YOUR FOOD WOULD RUN OUT BEFORE YOU GOT MONEY TO BUY MORE.: NEVER TRUE

## 2025-01-17 SDOH — ECONOMIC STABILITY: FOOD INSECURITY: WITHIN THE PAST 12 MONTHS, THE FOOD YOU BOUGHT JUST DIDN'T LAST AND YOU DIDN'T HAVE MONEY TO GET MORE.: NEVER TRUE

## 2025-01-17 ASSESSMENT — PATIENT HEALTH QUESTIONNAIRE - PHQ9
2. FEELING DOWN, DEPRESSED OR HOPELESS: MORE THAN HALF THE DAYS
SUM OF ALL RESPONSES TO PHQ QUESTIONS 1-9: 2
SUM OF ALL RESPONSES TO PHQ QUESTIONS 1-9: 2
SUM OF ALL RESPONSES TO PHQ9 QUESTIONS 1 & 2: 2
SUM OF ALL RESPONSES TO PHQ QUESTIONS 1-9: 2
SUM OF ALL RESPONSES TO PHQ QUESTIONS 1-9: 2
1. LITTLE INTEREST OR PLEASURE IN DOING THINGS: NOT AT ALL

## 2025-01-17 ASSESSMENT — ENCOUNTER SYMPTOMS
COUGH: 0
VOMITING: 0
DIARRHEA: 0
CONSTIPATION: 0
SHORTNESS OF BREATH: 0

## 2025-01-17 NOTE — PROGRESS NOTES
Sarah Alaniz (:  1965) is a Established patient, here for evaluation of the following:    Assessment & Plan   Below is the assessment and plan developed based on review of pertinent history, physical exam, labs, studies, and medications.  1. Anxiety  The following orders have not been finalized:  -     buPROPion (WELLBUTRIN XL) 300 MG extended release tablet  2. Essential hypertension  The following orders have not been finalized:  -     valsartan-hydroCHLOROthiazide (DIOVAN-HCT) 80-12.5 MG per tablet  Decrease wellbutrin to 150mg.  Start lexapro 10mg.   No follow-ups on file.       Subjective   HPI  Chief Complaint   Patient presents with    Medication Refill     Wants to see about refill her Anxiety medication as well.     F/U anxiety and depression - well controlled on current regimen w/o adverse effects. Denies oversedation.  Has been feeling really down this winter with the darker days.  Starts having more musculoskeletal pain as well. Has episodic feelings of just not wanting to be here anymore, but doesn't want to hurt herself or kill herself.  Has to force herself to get out of bed.      Has had more dizziness and feeling off balance.  Feels like its related to anxiety.     F/U HTN - BP well controlled. Compliant w/ med(s).  Denies s/s target organ damage.     Review of Systems   Constitutional:  Negative for diaphoresis and unexpected weight change.   HENT:  Negative for congestion.    Eyes:  Negative for visual disturbance.   Respiratory:  Negative for cough and shortness of breath.    Cardiovascular:  Negative for chest pain.   Gastrointestinal:  Negative for constipation, diarrhea and vomiting.   Genitourinary:  Negative for dysuria.   Neurological:  Negative for headaches.   Psychiatric/Behavioral:  Negative for dysphoric mood and sleep disturbance. The patient is not nervous/anxious.           Objective     Physical Exam  [INSTRUCTIONS:  \"[x]\" Indicates a positive item  \"[]\"

## 2025-01-21 ENCOUNTER — PATIENT MESSAGE (OUTPATIENT)
Dept: FAMILY MEDICINE CLINIC | Facility: CLINIC | Age: 60
End: 2025-01-21

## 2025-02-21 ENCOUNTER — OFFICE VISIT (OUTPATIENT)
Dept: ORTHOPEDIC SURGERY | Age: 60
End: 2025-02-21

## 2025-02-21 DIAGNOSIS — M17.12 PRIMARY OSTEOARTHRITIS OF LEFT KNEE: ICD-10-CM

## 2025-02-21 DIAGNOSIS — M17.11 PRIMARY OSTEOARTHRITIS OF RIGHT KNEE: Primary | ICD-10-CM

## 2025-02-21 NOTE — PROGRESS NOTES
Name: Sarah Alaniz  YOB: 1965  Gender: female  MRN: [id number]    Allergies   Allergen Reactions    Latex Hives    Sulfa Antibiotics Hives and Rash    Adhesive Tape Other (See Comments)     Itching, rash    Lisinopril Other (See Comments)       CC:  bilateral knee pain, Osteoarthritis    PROCEDURE: HA injection(s). All questions answered.     Procedure Note: The patient was placed in upright position with both knees hanging freely from exam table.  Both knees were prepped in sterile fashion using alcohol wipe(s).  Using an infrapatellar approach, 6cc of SynviscONE was injected freely.  The needle was then removed, pressure hemostatis achieved, injection site was cleansed with alcohol wipe and dressed with band aid.    The patient tolerated the procedure without complication.  The patient  will follow up as scheduled.    KYLER Tavera  02/21/25

## 2025-02-22 ENCOUNTER — APPOINTMENT (OUTPATIENT)
Dept: CT IMAGING | Age: 60
End: 2025-02-22
Payer: COMMERCIAL

## 2025-02-22 ENCOUNTER — HOSPITAL ENCOUNTER (EMERGENCY)
Age: 60
Discharge: HOME | End: 2025-02-22
Attending: EMERGENCY MEDICINE
Payer: COMMERCIAL

## 2025-02-22 VITALS
RESPIRATION RATE: 16 BRPM | TEMPERATURE: 98.1 F | SYSTOLIC BLOOD PRESSURE: 115 MMHG | HEIGHT: 67 IN | OXYGEN SATURATION: 100 % | WEIGHT: 192.3 LBS | DIASTOLIC BLOOD PRESSURE: 80 MMHG | BODY MASS INDEX: 30.18 KG/M2 | HEART RATE: 60 BPM

## 2025-02-22 DIAGNOSIS — K59.00 CONSTIPATION, UNSPECIFIED CONSTIPATION TYPE: Primary | ICD-10-CM

## 2025-02-22 DIAGNOSIS — R39.198 URINARY DYSFUNCTION: ICD-10-CM

## 2025-02-22 LAB
ALBUMIN SERPL-MCNC: 3.9 G/DL (ref 3.5–5)
ALBUMIN/GLOB SERPL: 1.2 (ref 1–1.9)
ALP SERPL-CCNC: 107 U/L (ref 35–104)
ALT SERPL-CCNC: 6 U/L (ref 12–65)
ANION GAP SERPL CALC-SCNC: 12 MMOL/L (ref 7–16)
APPEARANCE UR: CLEAR
AST SERPL-CCNC: 15 U/L (ref 15–37)
BACTERIA URNS QL MICRO: ABNORMAL /HPF
BASOPHILS # BLD: 0.03 K/UL (ref 0–0.2)
BASOPHILS NFR BLD: 0.6 % (ref 0–2)
BILIRUB SERPL-MCNC: 0.4 MG/DL (ref 0–1.2)
BILIRUB UR QL: NEGATIVE
BUN SERPL-MCNC: 8 MG/DL (ref 6–23)
CALCIUM SERPL-MCNC: 8.9 MG/DL (ref 8.8–10.2)
CHLORIDE SERPL-SCNC: 106 MMOL/L (ref 98–107)
CO2 SERPL-SCNC: 27 MMOL/L (ref 20–29)
COLOR UR: ABNORMAL
CREAT SERPL-MCNC: 0.85 MG/DL (ref 0.8–1.3)
DIFFERENTIAL METHOD BLD: ABNORMAL
EOSINOPHIL # BLD: 0.06 K/UL (ref 0–0.8)
EOSINOPHIL NFR BLD: 1.2 % (ref 0.5–7.8)
EPI CELLS #/AREA URNS HPF: ABNORMAL /HPF
ERYTHROCYTE [DISTWIDTH] IN BLOOD BY AUTOMATED COUNT: 13.5 % (ref 11.9–14.6)
GLOBULIN SER CALC-MCNC: 3.3 G/DL (ref 2.3–3.5)
GLUCOSE SERPL-MCNC: 103 MG/DL (ref 65–100)
GLUCOSE UR STRIP.AUTO-MCNC: NEGATIVE MG/DL
HCT VFR BLD AUTO: 35.8 % (ref 35.8–46.3)
HGB BLD-MCNC: 12.2 G/DL (ref 11.7–15.4)
HGB UR QL STRIP: NEGATIVE
IMM GRANULOCYTES # BLD AUTO: 0.01 K/UL (ref 0–0.5)
IMM GRANULOCYTES NFR BLD AUTO: 0.2 % (ref 0–5)
KETONES UR QL STRIP.AUTO: NEGATIVE MG/DL
LEUKOCYTE ESTERASE UR QL STRIP.AUTO: NEGATIVE
LYMPHOCYTES # BLD: 1.62 K/UL (ref 0.5–4.6)
LYMPHOCYTES NFR BLD: 33.3 % (ref 13–44)
MCH RBC QN AUTO: 31.1 PG (ref 26.1–32.9)
MCHC RBC AUTO-ENTMCNC: 34.1 G/DL (ref 31.4–35)
MCV RBC AUTO: 91.3 FL (ref 82–102)
MONOCYTES # BLD: 0.33 K/UL (ref 0.1–1.3)
MONOCYTES NFR BLD: 6.8 % (ref 4–12)
MUCOUS THREADS URNS QL MICRO: ABNORMAL /LPF
NEUTS SEG # BLD: 2.81 K/UL (ref 1.7–8.2)
NEUTS SEG NFR BLD: 57.9 % (ref 43–78)
NITRITE UR QL STRIP.AUTO: NEGATIVE
NRBC # BLD: 0 K/UL (ref 0–0.2)
OTHER OBSERVATIONS: ABNORMAL
PH UR STRIP: 7.5 (ref 5–9)
PLATELET # BLD AUTO: 232 K/UL (ref 150–450)
PMV BLD AUTO: 10.4 FL (ref 9.4–12.3)
POTASSIUM SERPL-SCNC: 3.4 MMOL/L (ref 3.5–5.1)
PROT SERPL-MCNC: 7.2 G/DL (ref 6.3–8.2)
PROT UR STRIP-MCNC: 30 MG/DL
RBC # BLD AUTO: 3.92 M/UL (ref 4.05–5.2)
RBC #/AREA URNS HPF: 0 /HPF
SODIUM SERPL-SCNC: 145 MMOL/L (ref 133–143)
SP GR UR REFRACTOMETRY: 1.02 (ref 1–1.02)
UROBILINOGEN UR QL STRIP.AUTO: 1 EU/DL (ref 0.2–1)
WBC # BLD AUTO: 4.9 K/UL (ref 4.3–11.1)
WBC URNS QL MICRO: ABNORMAL /HPF

## 2025-02-22 PROCEDURE — 96375 TX/PRO/DX INJ NEW DRUG ADDON: CPT

## 2025-02-22 PROCEDURE — 81001 URINALYSIS AUTO W/SCOPE: CPT

## 2025-02-22 PROCEDURE — 85025 COMPLETE CBC W/AUTO DIFF WBC: CPT

## 2025-02-22 PROCEDURE — 87086 URINE CULTURE/COLONY COUNT: CPT

## 2025-02-22 PROCEDURE — 74177 CT ABD & PELVIS W/CONTRAST: CPT

## 2025-02-22 PROCEDURE — 6360000004 HC RX CONTRAST MEDICATION: Performed by: EMERGENCY MEDICINE

## 2025-02-22 PROCEDURE — 96374 THER/PROPH/DIAG INJ IV PUSH: CPT

## 2025-02-22 PROCEDURE — 2580000003 HC RX 258: Performed by: EMERGENCY MEDICINE

## 2025-02-22 PROCEDURE — 99285 EMERGENCY DEPT VISIT HI MDM: CPT

## 2025-02-22 PROCEDURE — 80053 COMPREHEN METABOLIC PANEL: CPT

## 2025-02-22 PROCEDURE — 6360000002 HC RX W HCPCS: Performed by: EMERGENCY MEDICINE

## 2025-02-22 RX ORDER — DOCUSATE SODIUM 100 MG/1
100 CAPSULE, LIQUID FILLED ORAL 2 TIMES DAILY
Qty: 60 CAPSULE | Refills: 1 | Status: SHIPPED | OUTPATIENT
Start: 2025-02-22

## 2025-02-22 RX ORDER — 0.9 % SODIUM CHLORIDE 0.9 %
1000 INTRAVENOUS SOLUTION INTRAVENOUS ONCE
Status: COMPLETED | OUTPATIENT
Start: 2025-02-22 | End: 2025-02-22

## 2025-02-22 RX ORDER — ONDANSETRON 2 MG/ML
4 INJECTION INTRAMUSCULAR; INTRAVENOUS
Status: COMPLETED | OUTPATIENT
Start: 2025-02-22 | End: 2025-02-22

## 2025-02-22 RX ORDER — IOPAMIDOL 755 MG/ML
100 INJECTION, SOLUTION INTRAVASCULAR
Status: COMPLETED | OUTPATIENT
Start: 2025-02-22 | End: 2025-02-22

## 2025-02-22 RX ORDER — KETOROLAC TROMETHAMINE 30 MG/ML
30 INJECTION, SOLUTION INTRAMUSCULAR; INTRAVENOUS
Status: COMPLETED | OUTPATIENT
Start: 2025-02-22 | End: 2025-02-22

## 2025-02-22 RX ADMIN — IOPAMIDOL 100 ML: 755 INJECTION, SOLUTION INTRAVENOUS at 09:46

## 2025-02-22 RX ADMIN — SODIUM CHLORIDE 1000 ML: 0.9 INJECTION, SOLUTION INTRAVENOUS at 09:19

## 2025-02-22 RX ADMIN — ONDANSETRON 4 MG: 2 INJECTION, SOLUTION INTRAMUSCULAR; INTRAVENOUS at 10:09

## 2025-02-22 RX ADMIN — KETOROLAC TROMETHAMINE 30 MG: 30 INJECTION, SOLUTION INTRAMUSCULAR at 10:10

## 2025-02-22 ASSESSMENT — PAIN - FUNCTIONAL ASSESSMENT
PAIN_FUNCTIONAL_ASSESSMENT: 0-10
PAIN_FUNCTIONAL_ASSESSMENT: ACTIVITIES ARE NOT PREVENTED

## 2025-02-22 ASSESSMENT — PAIN SCALES - GENERAL
PAINLEVEL_OUTOF10: 4
PAINLEVEL_OUTOF10: 3
PAINLEVEL_OUTOF10: 5

## 2025-02-22 ASSESSMENT — ENCOUNTER SYMPTOMS
VOMITING: 0
NAUSEA: 0
CONSTIPATION: 1
COUGH: 0
RECTAL PAIN: 0
ABDOMINAL PAIN: 1
DIARRHEA: 0
BACK PAIN: 0
SHORTNESS OF BREATH: 0

## 2025-02-22 ASSESSMENT — PAIN DESCRIPTION - FREQUENCY: FREQUENCY: CONTINUOUS

## 2025-02-22 ASSESSMENT — LIFESTYLE VARIABLES
HOW OFTEN DO YOU HAVE A DRINK CONTAINING ALCOHOL: NEVER
HOW MANY STANDARD DRINKS CONTAINING ALCOHOL DO YOU HAVE ON A TYPICAL DAY: PATIENT DOES NOT DRINK

## 2025-02-22 ASSESSMENT — PAIN DESCRIPTION - DESCRIPTORS: DESCRIPTORS: PRESSURE

## 2025-02-22 ASSESSMENT — PAIN DESCRIPTION - ONSET: ONSET: PROGRESSIVE

## 2025-02-22 ASSESSMENT — PAIN DESCRIPTION - LOCATION: LOCATION: PELVIS

## 2025-02-22 ASSESSMENT — PAIN DESCRIPTION - PAIN TYPE: TYPE: ACUTE PAIN

## 2025-02-22 NOTE — ED TRIAGE NOTES
Patient also c/o urinary urgency and then only voids  \"a drip\". Also c/o urinary stress incontinence.

## 2025-02-22 NOTE — DISCHARGE INSTRUCTIONS
Will contact you with urine culture results.  Remain hydrated.  Eat high-fiber diet.  Continue MiraLAX.  Take Colace as directed.  Take enema once you return home.  Schedule close follow-up with primary care physician, gastroenterologist.  Please return if symptoms worsen or progress in any way.    Thank you for choosing us for your care today!

## 2025-02-22 NOTE — ED TRIAGE NOTES
Pt to the ED from home with spouse with c/o of urinary retention for the past 3 weeks. Pt states she has been to urgent care for this and they told her urine was clean and might have a bladder prolapse. Pt states that the retention has gotten worse.

## 2025-02-22 NOTE — ED PROVIDER NOTES
Emergency Department Provider Note       PCP: Oumou Hardy APRN - NP   Age: 59 y.o.   Sex: female     DISPOSITION Decision To Discharge 02/22/2025 10:33:02 AM    ICD-10-CM    1. Constipation, unspecified constipation type  K59.00       2. Urinary dysfunction  R39.198           Medical Decision Making     59-year-old female with history of GERD, fibromyalgia, hypertension, carcinoma of left breast with mets to axillary lymph node who presents with complaint of progressively worsening difficulty with emptying her bladder that has progressed over the past 3 weeks.  Patient unable to provide UA on arrival.  Vital signs stable.  Afebrile.    Bedside ultrasound used to evaluate bladder.  Finding suggestive of questionable bladder mass.  No significant urinary retention noted on bedside ultrasound.  Will obtain basic labs as well as UA and CT abdomen pelvis.  Patient was subsequently able to provide urinary specimen without difficulty.  Patient labs unremarkable.  Urine with likely contamination.  States that she had recent urine culture that was negative.  Will culture urine to ensure no UTI.  CT abdomen and pelvis with no hydroureter ureter nephrosis or significant bladder distention.  Moderate amount of stool in the large bowel.  Offered patient enema.  Patient declines and states she will take 1 at home.  Will discharge home with Colace.  Instructed to continue MiraLAX.  Patient structured to follow-up with her gastroenterologist as well as PCP.  Patient given strict return precautions.  Instructed if no improvement of symptoms she may eventually need to follow-up with urogynecologist.  Patient instructed to eat high-fiber diet and to remain hydrated.    ED Course as of 02/22/25 1034   Sat Feb 22, 2025   1026 CT abd/pelvis with IV contrast IMPRESSION:  1. No hydroureteronephrosis or significant bladder distention.  2. Moderate amount of stool in the large bowel.      [DF]      ED Course User Index  [DF] Ashley  inflammatory changes.  - LYMPH NODES: No significant retroperitoneal, mesenteric, or pelvic adenopathy.  - BONES: No fracture or significant bone lesion.  - VASCULATURE: Normal  - OTHER: No ascites.      Impression    1. No hydroureteronephrosis or significant bladder distention.  2. Moderate amount of stool in the large bowel.      If providers have any questions about this report, I can be reached on  [employer].      Electronically signed by Gus Main   Urinalysis w rflx microscopic   Result Value Ref Range    Color, UA DARK YELLOW      Appearance CLEAR      Specific Gravity, UA 1.020 1.001 - 1.023      pH, Urine 7.5 5.0 - 9.0      Protein, UA 30 (A) NEG mg/dL    Glucose, Ur Negative NEG mg/dL    Ketones, Urine Negative NEG mg/dL    Bilirubin, Urine Negative NEG      Blood, Urine Negative NEG      Urobilinogen, Urine 1.0 0.2 - 1.0 EU/dL    Nitrite, Urine Negative NEG      Leukocyte Esterase, Urine Negative NEG     CBC with Auto Differential   Result Value Ref Range    WBC 4.9 4.3 - 11.1 K/uL    RBC 3.92 (L) 4.05 - 5.20 M/uL    Hemoglobin 12.2 11.7 - 15.4 g/dL    Hematocrit 35.8 35.8 - 46.3 %    MCV 91.3 82.0 - 102.0 FL    MCH 31.1 26.1 - 32.9 PG    MCHC 34.1 31.4 - 35.0 g/dL    RDW 13.5 11.9 - 14.6 %    Platelets 232 150 - 450 K/uL    MPV 10.4 9.4 - 12.3 FL    nRBC 0.00 0.0 - 0.2 K/uL    Differential Type AUTOMATED      Neutrophils % 57.9 43.0 - 78.0 %    Lymphocytes % 33.3 13.0 - 44.0 %    Monocytes % 6.8 4.0 - 12.0 %    Eosinophils % 1.2 0.5 - 7.8 %    Basophils % 0.6 0.0 - 2.0 %    Immature Granulocytes % 0.2 0.0 - 5.0 %    Neutrophils Absolute 2.81 1.70 - 8.20 K/UL    Lymphocytes Absolute 1.62 0.50 - 4.60 K/UL    Monocytes Absolute 0.33 0.10 - 1.30 K/UL    Eosinophils Absolute 0.06 0.00 - 0.80 K/UL    Basophils Absolute 0.03 0.00 - 0.20 K/UL    Immature Granulocytes Absolute 0.01 0.0 - 0.5 K/UL   CMP   Result Value Ref Range    Sodium 145 (H) 133 - 143 mmol/L    Potassium 3.4 (L) 3.5 - 5.1 mmol/L

## 2025-02-23 LAB
BACTERIA SPEC CULT: NORMAL
SERVICE CMNT-IMP: NORMAL

## 2025-02-24 LAB
BACTERIA SPEC CULT: NORMAL
SERVICE CMNT-IMP: NORMAL

## 2025-03-24 SDOH — HEALTH STABILITY: PHYSICAL HEALTH: ON AVERAGE, HOW MANY MINUTES DO YOU ENGAGE IN EXERCISE AT THIS LEVEL?: 30 MIN

## 2025-03-24 SDOH — HEALTH STABILITY: PHYSICAL HEALTH: ON AVERAGE, HOW MANY DAYS PER WEEK DO YOU ENGAGE IN MODERATE TO STRENUOUS EXERCISE (LIKE A BRISK WALK)?: 3 DAYS

## 2025-03-26 ENCOUNTER — OFFICE VISIT (OUTPATIENT)
Dept: FAMILY MEDICINE CLINIC | Facility: CLINIC | Age: 60
End: 2025-03-26
Payer: COMMERCIAL

## 2025-03-26 VITALS
HEIGHT: 67 IN | HEART RATE: 85 BPM | RESPIRATION RATE: 19 BRPM | TEMPERATURE: 96.8 F | BODY MASS INDEX: 31.08 KG/M2 | WEIGHT: 198 LBS | DIASTOLIC BLOOD PRESSURE: 74 MMHG | SYSTOLIC BLOOD PRESSURE: 99 MMHG | OXYGEN SATURATION: 99 %

## 2025-03-26 DIAGNOSIS — K21.9 GASTROESOPHAGEAL REFLUX DISEASE, UNSPECIFIED WHETHER ESOPHAGITIS PRESENT: ICD-10-CM

## 2025-03-26 DIAGNOSIS — F41.9 ANXIETY: Primary | ICD-10-CM

## 2025-03-26 DIAGNOSIS — I10 ESSENTIAL HYPERTENSION: ICD-10-CM

## 2025-03-26 DIAGNOSIS — R33.9 URINARY RETENTION: ICD-10-CM

## 2025-03-26 PROCEDURE — 3078F DIAST BP <80 MM HG: CPT

## 2025-03-26 PROCEDURE — 3074F SYST BP LT 130 MM HG: CPT

## 2025-03-26 PROCEDURE — 99214 OFFICE O/P EST MOD 30 MIN: CPT

## 2025-03-26 RX ORDER — ESCITALOPRAM OXALATE 10 MG/1
10 TABLET ORAL DAILY
Qty: 90 TABLET | Refills: 1 | Status: SHIPPED | OUTPATIENT
Start: 2025-03-26

## 2025-03-26 RX ORDER — BUPROPION HYDROCHLORIDE 300 MG/1
300 TABLET ORAL EVERY MORNING
Qty: 90 TABLET | Refills: 1 | Status: SHIPPED | OUTPATIENT
Start: 2025-03-26

## 2025-03-26 ASSESSMENT — ENCOUNTER SYMPTOMS
BLOOD IN STOOL: 0
TROUBLE SWALLOWING: 0
DIARRHEA: 0
SHORTNESS OF BREATH: 0
BACK PAIN: 1
CONSTIPATION: 1
ABDOMINAL PAIN: 0
WHEEZING: 0
CHEST TIGHTNESS: 0
PHOTOPHOBIA: 0

## 2025-03-26 NOTE — PROGRESS NOTES
Sarah Alaniz (:  1965) is a 59 y.o. female,New patient, here for evaluation of the following chief complaint(s):  Established New Doctor (History of HTN and Anxiety.) and Urinary Retention (Feels like she is not getting everything out when she goes and sometime has to force her self to urinate.)         Chief Complaint   Patient presents with    Established New Doctor     History of HTN and Anxiety.    Urinary Retention     Feels like she is not getting everything out when she goes and sometime has to force her self to urinate.       Assessment & Plan  Anxiety   Chronic, at goal (stable), continue current treatment plan, medication adherence emphasized, and lifestyle modifications recommended    Orders:    escitalopram (LEXAPRO) 10 MG tablet; Take 1 tablet by mouth daily    buPROPion (WELLBUTRIN XL) 300 MG extended release tablet; Take 1 tablet by mouth every morning    Essential hypertension   Chronic, at goal (stable), continue current treatment plan, medication adherence emphasized, and lifestyle modifications recommended         Urinary retention   New, not at goal (unstable), continue current plan pending work up below    Orders:    [facility], [place]    Gastroesophageal reflux disease, unspecified whether esophagitis present   Chronic, at goal (stable), continue current treatment plan, medication adherence emphasized, and lifestyle modifications recommended           Return in about 3 months (around 2025) for 3 month f/u with fasting labs prior to visit. .       Subjective   Pt presents to establish care / 1 month f/u for anxiety. Previously a pt of Dr. Stevens. Pt would like to wait until next visit for lab work.     -HTN: dx in her early 40's . Currently well controlled on valsartan-HCTZ 80-12.5 mg; denies medication SE, CP, SOB, palpitations, dizziness, syncope.      -Dep/ Anxiety: has been on wellbutrin for several years; recent decrease from 300-150

## 2025-03-26 NOTE — ASSESSMENT & PLAN NOTE
Chronic, at goal (stable), continue current treatment plan, medication adherence emphasized, and lifestyle modifications recommended    Orders:    escitalopram (LEXAPRO) 10 MG tablet; Take 1 tablet by mouth daily    buPROPion (WELLBUTRIN XL) 300 MG extended release tablet; Take 1 tablet by mouth every morning

## 2025-04-23 ENCOUNTER — OFFICE VISIT (OUTPATIENT)
Dept: UROLOGY | Age: 60
End: 2025-04-23
Payer: COMMERCIAL

## 2025-04-23 DIAGNOSIS — R39.14 FEELING OF INCOMPLETE BLADDER EMPTYING: ICD-10-CM

## 2025-04-23 DIAGNOSIS — R39.15 URGENCY OF URINATION: Primary | ICD-10-CM

## 2025-04-23 DIAGNOSIS — R35.0 URINARY FREQUENCY: ICD-10-CM

## 2025-04-23 LAB — PVR, POC: 0 CC

## 2025-04-23 PROCEDURE — 99204 OFFICE O/P NEW MOD 45 MIN: CPT | Performed by: NURSE PRACTITIONER

## 2025-04-23 PROCEDURE — 51798 US URINE CAPACITY MEASURE: CPT | Performed by: NURSE PRACTITIONER

## 2025-04-23 RX ORDER — MIRABEGRON 50 MG/1
50 TABLET, FILM COATED, EXTENDED RELEASE ORAL DAILY
Qty: 90 TABLET | Refills: 3 | Status: SHIPPED | OUTPATIENT
Start: 2025-04-23

## 2025-04-23 ASSESSMENT — ENCOUNTER SYMPTOMS
EYE PAIN: 0
COUGH: 0
HEARTBURN: 0
BLOOD IN STOOL: 0
CONSTIPATION: 0
NAUSEA: 0
EYE DISCHARGE: 0
SKIN LESIONS: 0
DIARRHEA: 0
SHORTNESS OF BREATH: 0
INDIGESTION: 0
BACK PAIN: 0
ABDOMINAL PAIN: 0
WHEEZING: 0
VOMITING: 0

## 2025-04-23 NOTE — PROGRESS NOTES
[facility]  [address] [place] [id number]  [phone]          Sarah Alaniz  : 1965    Chief Complaint   Patient presents with    New Patient    Urinary Retention          HPI     Sarah Alaniz is a 59 y.o. female  Here today referred by occupational health NP.  Patient was seen in the ER back in February due to inability to void.  Patient says the day that she presented to the ER she was in and out of the bathroom about every 10 minutes and could not urinate.  On exam in the ER her bladder was empty.  They did do a CT scan.  Results of that as below.    IMPRESSION:  1. No hydroureteronephrosis or significant bladder distention.  2. Moderate amount of stool in the large bowel.    As far as frequency daytime she can be in the bathroom every 30 minutes.  This lingers till mid afternoon and it will improve somewhat with fluid intake.  She does not have to get up at night to void.    Significant history of 2 pregnancies both vaginal deliveries.  The largest baby was right at 9 pounds.  She also underwent a hysterectomy due to a benign mass.  She is also has significant history of breast cancer but is in remission.    Here today to discuss her symptoms and any treatment options available.    Past Medical History:   Diagnosis Date    Anxiety     Cancer (HCC)     LEFT BREAST    Chronic gastritis     COVID-19 vaccine series completed     Pfizer vaccine completed X 3 doses    Fibromyalgia     GERD (gastroesophageal reflux disease)     managed with Nexium    History of breast cancer     Left lumpectomy, pt currently taking Letrozole    HTN (hypertension)     managed well with meds    Refusal of blood transfusions as patient is [employer]     Wrist pain, left      Past Surgical History:   Procedure Laterality Date    ACHILLES TENDON SURGERY Left 2024    Left achilles secondary reconstruction repair performed by Alpesh Taylor III, MD at [facility]

## 2025-05-06 ENCOUNTER — HOSPITAL ENCOUNTER (OUTPATIENT)
Dept: PHYSICAL THERAPY | Age: 60
Setting detail: RECURRING SERIES
Discharge: HOME | End: 2025-05-09
Payer: COMMERCIAL

## 2025-05-06 DIAGNOSIS — N39.3 STRESS INCONTINENCE: ICD-10-CM

## 2025-05-06 DIAGNOSIS — R39.89 OTHER SYMPTOMS AND SIGNS INVOLVING THE GENITOURINARY SYSTEM: ICD-10-CM

## 2025-05-06 DIAGNOSIS — R27.8 OTHER LACK OF COORDINATION: Primary | ICD-10-CM

## 2025-05-06 DIAGNOSIS — M62.81 MUSCLE WEAKNESS (GENERALIZED): ICD-10-CM

## 2025-05-06 DIAGNOSIS — M62.838 OTHER MUSCLE SPASM: ICD-10-CM

## 2025-05-06 PROCEDURE — 97530 THERAPEUTIC ACTIVITIES: CPT

## 2025-05-06 PROCEDURE — 97162 PT EVAL MOD COMPLEX 30 MIN: CPT

## 2025-05-06 ASSESSMENT — PAIN SCALES - GENERAL: PAINLEVEL_OUTOF10: 0

## 2025-05-06 NOTE — THERAPY EVALUATION
breast cancer, HTN (hypertension), Refusal of blood transfusions as patient is [employer], and Wrist pain, left.  Ms. Alaniz  has a past surgical history that includes Breast biopsy (Left, 08/06/2019); Colonoscopy; Breast lumpectomy (Left); Cholecystectomy; Hysterectomy (1998); Breast biopsy (Left, 9/18/2019); US PLACE BREAST LOC DEVICE 1ST LESION LEFT (Left, 09/18/2019); US BREAST BIOPSY W LOC DEVICE 1ST LESION LEFT (Left, 08/06/2019); US BIOPSY LYMPH NODE (08/19/2019); Esophagogastroduodenoscopy; Wrist surgery (Right, 07/08/2024); Achilles tendon surgery (Left, 08/29/2024); Foot surgery (Left, 08/29/2024); Foot surgery (Left, 08/29/2024); Capsule endoscopy (02/2024); and Hysterectomy, total abdominal.    Social History/Living Environment:   Patient lives with their spouse      Level of Function/Work/Activity:    Prior Level of Function: Independent   Employment Status: Employed full time  Occupation:          Learning:   Does the patient/guardian have any barriers to learning?: (Patient-Rptd) No barriers  How does the patient/guardian prefer to learn new concepts?: (Patient-Rptd) Listening; Reading; Demonstration; Pictures/Videos    Fall Risk Scale:   Romo Total Score: 0         OBJECTIVE   Chaperone for Intimate Exam  Chaperone was offered as part of the rooming process. Patient declined and agrees to continue with exam without a chaperone.  Chaperone: none  Pt gives verbal consent to internal vaginal assessment/treatment.    External Observations:  Voluntary contraction: Present  Voluntary relaxation: decreased ROM   Involuntary contraction: Absent   Involuntary relaxation: delayed  Perineal descent at rest: Absent   Perineal descent with bearing: decreased ROM  Skin integrity: WNL    Pelvic Floor Muscle (PFM) Assessment:  Vaginal vault size: [] decreased     [] increased     [x] WNL  Muscle volume: [] decreased     [x] WNL     [] Defect  PFM tension: [] decreased     [x] increased     []

## 2025-05-06 NOTE — PROGRESS NOTES
Sarah Alaniz  : 1965  Primary: [employer] [place] [employer])  Secondary:  [facility]  [address]  [address]  [facility] [address]  Phone: [phone]  Fax: [phone] Plan Frequency: 1xweek/5 weeks    Plan of Care/Certification Expiration Date: 25        Plan of Care/Certification Expiration Date:  Plan of Care/Certification Expiration Date: 25    Frequency/Duration: Plan Frequency: 1xweek/5 weeks      Time In/Out:   Time In: 0851  Time Out: 1000      PT Visit Info:         Visit Count:  1    OUTPATIENT PHYSICAL THERAPY:   Treatment Note 2025       Episode  (PFPT)               Treatment Diagnosis:    Other lack of coordination  Muscle weakness (generalized)  Stress incontinence  Other muscle spasm  Other symptoms and signs involving the genitourinary system  Medical/Referring Diagnosis:    Urgency of urination [R39.15]    Referring Physician:  Fern Romero APRN - NP MD Orders:  PT Eval and Treat   Return MD Appt:  25   Date of Onset:  Onset Date: 25     Allergies:   Latex, Sulfa antibiotics, Adhesive tape, and Lisinopril  Restrictions/Precautions:   None      Interventions Planned (Treatment may consist of any combination of the following):     See Assessment Note    Subjective Comments:   See Evaluation 25  Initial Pain Level::     0/10  Post Session Pain Level:       0/10  Medications Last Reviewed:  2025  Updated Objective Findings:  See Evaluation Note from today  Treatment   THERAPEUTIC EXERCISE: (5 minutes): Exercises per grid below to improve mobility.     Date:  25 Date:   Date:     Activity/Exercise Parameters Parameters Parameters   HEP -diaphragmatic breathing  -supine PF stretch                    THERAPEUTIC ACTIVITY: ( 30 minutes): Functional activity education regarding anatomy, pathology and role of pelvic floor muscle (PFM) function in relation to presenting symptoms and role of pelvic floor therapy in conservative

## 2025-05-13 ENCOUNTER — HOSPITAL ENCOUNTER (OUTPATIENT)
Dept: PHYSICAL THERAPY | Age: 60
Setting detail: RECURRING SERIES
Discharge: HOME | End: 2025-05-16
Payer: COMMERCIAL

## 2025-05-13 PROCEDURE — 97110 THERAPEUTIC EXERCISES: CPT

## 2025-05-13 ASSESSMENT — PAIN SCALES - GENERAL: PAINLEVEL_OUTOF10: 0

## 2025-05-13 NOTE — PROGRESS NOTES
Sarah Camelia [place]  : 1965  Primary: [employer] [place] [employer])  Secondary:  [place] [address]  [address]  [facility] [address]  Phone: [phone]  Fax: [phone] Plan Frequency: 1xweek/5 weeks    Plan of Care/Certification Expiration Date: 25        Plan of Care/Certification Expiration Date:  Plan of Care/Certification Expiration Date: 25    Frequency/Duration: Plan Frequency: 1xweek/5 weeks      Time In/Out:   Time In: 1051  Time Out: 1145      PT Visit Info:         Visit Count:  2    OUTPATIENT PHYSICAL THERAPY:   Treatment Note 2025       Episode  (PFPT)               Treatment Diagnosis:    Other lack of coordination  Muscle weakness (generalized)  Stress incontinence  Other muscle spasm  Other symptoms and signs involving the genitourinary system  Medical/Referring Diagnosis:    Urgency of urination [R39.15]    Referring Physician:  Fern Romero APRN - NP MD Orders:  PT Eval and Treat   Return MD Appt:  25   Date of Onset:  Onset Date: 25     Allergies:   Latex, Sulfa antibiotics, Adhesive tape, and Lisinopril  Restrictions/Precautions:   None      Interventions Planned (Treatment may consist of any combination of the following):     See Assessment Note    Subjective Comments:   States squatty potty has been really helpful. Urgency has been better past 2-3 days , thinks diaphragmatic breathing has been helpful   Initial Pain Level::     0/10  Post Session Pain Level:       0/10  Medications Last Reviewed:  2025  Updated Objective Findings:  None Today  Treatment   THERAPEUTIC EXERCISE: (54 minutes): Exercises per grid below to improve mobility.     Date:  25 Date:  25 Date:     Activity/Exercise Parameters Parameters Parameters   HEP -diaphragmatic breathing  -supine PF stretch      Diaphragmatic breathing   3 min     Supine PF stretch   3 min     Hip adductor stretch   30s x 3 B    Piriformis

## 2025-05-21 ENCOUNTER — HOSPITAL ENCOUNTER (OUTPATIENT)
Dept: PHYSICAL THERAPY | Age: 60
Setting detail: RECURRING SERIES
Discharge: HOME | End: 2025-05-24
Payer: COMMERCIAL

## 2025-05-21 PROCEDURE — 97110 THERAPEUTIC EXERCISES: CPT

## 2025-05-21 ASSESSMENT — PAIN SCALES - GENERAL: PAINLEVEL_OUTOF10: 0

## 2025-05-21 NOTE — PROGRESS NOTES
5/28/2025  9:00 AM Ann Quiñones, PT [facility]   6/3/2025  9:00 AM Ann Quiñones, PT [facility]   6/4/2025  9:15 AM Fern Romero APRN - NP LEEANN [facility]   6/10/2025  9:00 AM Ann Quiñones, PT [facility]   6/19/2025  9:45 AM PRE LAB PRE [facility] DEP   6/26/2025  1:20 PM Oumou Hardy, APRN - NP PRE [facility] DE[facility]   6/30/2025  9:45 AM Antonia Austin PA POAI [facility] AMB

## 2025-05-28 ENCOUNTER — APPOINTMENT (OUTPATIENT)
Dept: PHYSICAL THERAPY | Age: 60
End: 2025-05-28
Payer: COMMERCIAL

## 2025-06-03 ENCOUNTER — HOSPITAL ENCOUNTER (OUTPATIENT)
Dept: PHYSICAL THERAPY | Age: 60
Setting detail: RECURRING SERIES
Discharge: HOME | End: 2025-06-06
Payer: COMMERCIAL

## 2025-06-03 PROCEDURE — 97110 THERAPEUTIC EXERCISES: CPT

## 2025-06-03 ASSESSMENT — PAIN SCALES - GENERAL: PAINLEVEL_OUTOF10: 0

## 2025-06-03 NOTE — PROGRESS NOTES
Sarah Camelia [place]  : 1965  Primary: [employer] [place] [employer])  Secondary:  [place] [address]  [address]  [facility] [address]  Phone: [phone]  Fax: [phone] Plan Frequency: 1xweek/5 weeks    Plan of Care/Certification Expiration Date: 25        Plan of Care/Certification Expiration Date:  Plan of Care/Certification Expiration Date: 25    Frequency/Duration: Plan Frequency: 1xweek/5 weeks      Time In/Out:   Time In: 853  Time Out: 946      PT Visit Info:         Visit Count:  4    OUTPATIENT PHYSICAL THERAPY:   Treatment Note 6/3/2025       Episode  (PFPT)               Treatment Diagnosis:    Other lack of coordination  Muscle weakness (generalized)  Stress incontinence  Other muscle spasm  Other symptoms and signs involving the genitourinary system  Medical/Referring Diagnosis:    Urgency of urination [R39.15]    Referring Physician:  Fern Romero APRN - NP MD Orders:  PT Eval and Treat   Return MD Appt:  25   Date of Onset:  Onset Date: 25     Allergies:   Latex, Sulfa antibiotics, Adhesive tape, and Lisinopril  Restrictions/Precautions:   None      Interventions Planned (Treatment may consist of any combination of the following):     See Assessment Note    Subjective Comments:   States was sick the past week, rested and just took it easy. Thinks she may have a sinus infection today so may have to limit session time today. In general, symptoms still doing well. States back is feeling better   Initial Pain Level::     0/10  Post Session Pain Level:       0/10  Medications Last Reviewed:  6/3/2025  Updated Objective Findings:  None Today  Treatment   THERAPEUTIC EXERCISE: (53 minutes): Exercises per grid below to improve mobility.     Date:  25 Date:  25 Date:  25 Date:  6/3/25   Activity/Exercise Parameters Parameters Parameters Parameters   HEP -diaphragmatic breathing  -supine PF stretch

## 2025-06-04 ENCOUNTER — OFFICE VISIT (OUTPATIENT)
Dept: UROLOGY | Age: 60
End: 2025-06-04
Payer: COMMERCIAL

## 2025-06-04 DIAGNOSIS — R39.15 URGENCY OF URINATION: Primary | ICD-10-CM

## 2025-06-04 DIAGNOSIS — R35.0 URINARY FREQUENCY: ICD-10-CM

## 2025-06-04 LAB
BILIRUBIN, URINE, POC: NEGATIVE
BLOOD URINE, POC: NEGATIVE
GLUCOSE URINE, POC: NEGATIVE MG/DL
KETONES, URINE, POC: NEGATIVE MG/DL
LEUKOCYTE ESTERASE, URINE, POC: NEGATIVE
NITRITE, URINE, POC: NEGATIVE
PH, URINE, POC: 7 (ref 4.6–8)
PROTEIN,URINE, POC: NEGATIVE MG/DL
SPECIFIC GRAVITY, URINE, POC: 1.01 (ref 1–1.03)
URINALYSIS CLARITY, POC: NORMAL
URINALYSIS COLOR, POC: NORMAL
UROBILINOGEN, POC: NORMAL MG/DL

## 2025-06-04 PROCEDURE — 99213 OFFICE O/P EST LOW 20 MIN: CPT | Performed by: NURSE PRACTITIONER

## 2025-06-04 PROCEDURE — 81003 URINALYSIS AUTO W/O SCOPE: CPT | Performed by: NURSE PRACTITIONER

## 2025-06-04 ASSESSMENT — ENCOUNTER SYMPTOMS
NAUSEA: 0
BACK PAIN: 0

## 2025-06-04 NOTE — PROGRESS NOTES
tablet by mouth nightly at bedtime      valsartan-hydroCHLOROthiazide (DIOVAN-HCT) 80-12.5 MG per tablet Take 1 tablet by mouth daily 90 tablet 1    Omega-3 Fatty Acids (FISH OIL PO) Take by mouth daily      MAGNESIUM PO Take by mouth daily      esomeprazole (NEXIUM) 40 MG delayed release capsule TAKE 1 CAPSULE BY MOUTH DAILY 30 MIN BEFORE 1ST MEAL       No current facility-administered medications for this visit.     Allergies   Allergen Reactions    Latex Hives    Sulfa Antibiotics Hives and Rash    Adhesive Tape Other (See Comments)     Itching, rash    Lisinopril Other (See Comments)     Social History     Socioeconomic History    Marital status:      Spouse name: Not on file    Number of children: Not on file    Years of education: Not on file    Highest education level: Not on file   Occupational History    Not on file   Tobacco Use    Smoking status: Never    Smokeless tobacco: Never   Vaping Use    Vaping status: Never Used   Substance and Sexual Activity    Alcohol use: Never    Drug use: Never    Sexual activity: Yes     Partners: Male   Other Topics Concern    Not on file   Social History Narrative    Not on file     Social Drivers of Health     Financial Resource Strain: Low Risk  (2/27/2024)    Overall Financial Resource Strain (CARDIA)     Difficulty of Paying Living Expenses: Not hard at all   Food Insecurity: No Food Insecurity (1/17/2025)    Hunger Vital Sign     Worried About Running Out of Food in the Last Year: Never true     Ran Out of Food in the Last Year: Never true   Transportation Needs: No Transportation Needs (1/17/2025)    PRAPARE - Transportation     Lack of Transportation (Medical): No     Lack of Transportation (Non-Medical): No   Physical Activity: Insufficiently Active (3/24/2025)    Exercise Vital Sign     Days of Exercise per Week: 3 days     Minutes of Exercise per Session: 30 min   Stress: Not on file   Social Connections: Unknown (10/27/2021)    Received from [facility]

## 2025-06-10 ENCOUNTER — HOSPITAL ENCOUNTER (OUTPATIENT)
Dept: PHYSICAL THERAPY | Age: 60
Setting detail: RECURRING SERIES
Discharge: HOME | End: 2025-06-13
Payer: COMMERCIAL

## 2025-06-10 PROCEDURE — 97530 THERAPEUTIC ACTIVITIES: CPT

## 2025-06-10 ASSESSMENT — PAIN SCALES - GENERAL: PAINLEVEL_OUTOF10: 0

## 2025-06-10 NOTE — PROGRESS NOTES
Sarah Camelia [place]  : 1965  Primary: [employer] [place] [employer])  Secondary:  [place] [address]  [address]  [facility] [address]  Phone: [phone]  Fax: [phone] Plan Frequency: 1xweek/5 weeks    Plan of Care/Certification Expiration Date: 25        Plan of Care/Certification Expiration Date:  Plan of Care/Certification Expiration Date: 25    Frequency/Duration: Plan Frequency: 1xweek/5 weeks      Time In/Out:   Time In: 857  Time Out: 915      PT Visit Info:         Visit Count:  5    OUTPATIENT PHYSICAL THERAPY:   Treatment Note 6/10/2025       Episode  (PFPT)               Treatment Diagnosis:    Other lack of coordination  Muscle weakness (generalized)  Stress incontinence  Other muscle spasm  Other symptoms and signs involving the genitourinary system  Medical/Referring Diagnosis:    Urgency of urination [R39.15]    Referring Physician:  Fern Romero APRN - NP MD Orders:  PT Eval and Treat   Return MD Appt:  25   Date of Onset:  Onset Date: 25     Allergies:   Latex, Sulfa antibiotics, Adhesive tape, and Lisinopril  Restrictions/Precautions:   None      Interventions Planned (Treatment may consist of any combination of the following):     See Assessment Note    Subjective Comments:   Doing well, feels confident with DC this day   Initial Pain Level::     0/10  Post Session Pain Level:       0/10  Medications Last Reviewed:  6/10/2025  Updated Objective Findings:  None Today  Treatment   THERAPEUTIC EXERCISE: (0 minutes): Exercises per grid below to improve mobility.     Date:  25 Date:  25 Date:  25 Date:  6/3/25   Activity/Exercise Parameters Parameters Parameters Parameters   HEP -diaphragmatic breathing  -supine PF stretch       Diaphragmatic breathing   3 min  3 min  3 min    Supine PF stretch   3 min  3 min  3 min    Hip adductor stretch   30s x 3 B 30s x 3 B 30s x 3 B    Piriformis stretch   30s x

## 2025-06-10 NOTE — THERAPY DISCHARGE
cancer, HTN (hypertension), Refusal of blood transfusions as patient is [employer], and Wrist pain, left.  Ms. Alaniz  has a past surgical history that includes Breast biopsy (Left, 08/06/2019); Colonoscopy; Breast lumpectomy (Left); Cholecystectomy; Hysterectomy (1998); Breast biopsy (Left, 9/18/2019); US PLACE BREAST LOC DEVICE 1ST LESION LEFT (Left, 09/18/2019); US BREAST BIOPSY W LOC DEVICE 1ST LESION LEFT (Left, 08/06/2019); US BIOPSY LYMPH NODE (08/19/2019); Esophagogastroduodenoscopy; Wrist surgery (Right, 07/08/2024); Achilles tendon surgery (Left, 08/29/2024); Foot surgery (Left, 08/29/2024); Foot surgery (Left, 08/29/2024); Capsule endoscopy (02/2024); and Hysterectomy, total abdominal.         OBJECTIVE   Declined internal examination this day 6/10/25      Chaperone for Intimate Exam  Chaperone was offered as part of the rooming process. Patient declined and agrees to continue with exam without a chaperone.  Chaperone: none  Pt gives verbal consent to internal vaginal assessment/treatment.    External Observations:  Voluntary contraction: Present  Voluntary relaxation: decreased ROM   Involuntary contraction: Absent   Involuntary relaxation: delayed  Perineal descent at rest: Absent   Perineal descent with bearing: decreased ROM  Skin integrity: WNL    Pelvic Floor Muscle (PFM) Assessment:  Vaginal vault size: [] decreased     [x] increased     [x] WNL  Muscle volume: [] decreased     [x] WNL     [] Defect  PFM tension: [] decreased     [x] increased     [] WNL    Contraction ability:  Voluntary contraction: [] absent     [] weak     [x] moderate      [] strong  Voluntary relaxation: [] absent     [] complete   [x] incomplete   [] delayed   MMT: 2/5   PFM endurance: 8 seconds   Number of quick contractions in 10 seconds: 5  Quality of contractions: moderately strong, incomplete/delayed ROM   Overflow: [x] absent     [] min     [] mod     [] severe    Breath assessment:  Observation: chest

## 2025-06-11 ENCOUNTER — LAB (OUTPATIENT)
Dept: FAMILY MEDICINE CLINIC | Facility: CLINIC | Age: 60
End: 2025-06-11

## 2025-06-11 DIAGNOSIS — Z00.00 GENERAL MEDICAL EXAM: Primary | ICD-10-CM

## 2025-06-11 DIAGNOSIS — Z00.00 GENERAL MEDICAL EXAM: ICD-10-CM

## 2025-06-11 LAB
ALBUMIN SERPL-MCNC: 3.6 G/DL (ref 3.5–5)
ALBUMIN/GLOB SERPL: 0.9 (ref 1–1.9)
ALP SERPL-CCNC: 84 U/L (ref 35–104)
ALT SERPL-CCNC: 10 U/L (ref 8–45)
ANION GAP SERPL CALC-SCNC: 13 MMOL/L (ref 7–16)
AST SERPL-CCNC: 21 U/L (ref 15–37)
BASOPHILS # BLD: 0.03 K/UL (ref 0–0.2)
BASOPHILS NFR BLD: 0.4 % (ref 0–2)
BILIRUB SERPL-MCNC: 0.4 MG/DL (ref 0–1.2)
BUN SERPL-MCNC: 10 MG/DL (ref 6–23)
CALCIUM SERPL-MCNC: 9.7 MG/DL (ref 8.8–10.2)
CHLORIDE SERPL-SCNC: 103 MMOL/L (ref 98–107)
CHOLEST SERPL-MCNC: 223 MG/DL (ref 0–200)
CO2 SERPL-SCNC: 25 MMOL/L (ref 20–29)
CREAT SERPL-MCNC: 1.02 MG/DL (ref 0.6–1.1)
DIFFERENTIAL METHOD BLD: ABNORMAL
EOSINOPHIL # BLD: 0.05 K/UL (ref 0–0.8)
EOSINOPHIL NFR BLD: 0.6 % (ref 0.5–7.8)
ERYTHROCYTE [DISTWIDTH] IN BLOOD BY AUTOMATED COUNT: 13.2 % (ref 11.9–14.6)
EST. AVERAGE GLUCOSE BLD GHB EST-MCNC: 103 MG/DL
GLOBULIN SER CALC-MCNC: 4.1 G/DL (ref 2.3–3.5)
GLUCOSE SERPL-MCNC: 80 MG/DL (ref 70–99)
HBA1C MFR BLD: 5.2 % (ref 0–5.6)
HCT VFR BLD AUTO: 36.5 % (ref 35.8–46.3)
HDLC SERPL-MCNC: 69 MG/DL (ref 40–60)
HDLC SERPL: 3.2 (ref 0–5)
HGB BLD-MCNC: 12.7 G/DL (ref 11.7–15.4)
IMM GRANULOCYTES # BLD AUTO: 0.02 K/UL (ref 0–0.5)
IMM GRANULOCYTES NFR BLD AUTO: 0.3 % (ref 0–5)
LDLC SERPL CALC-MCNC: 147 MG/DL (ref 0–100)
LYMPHOCYTES # BLD: 2 K/UL (ref 0.5–4.6)
LYMPHOCYTES NFR BLD: 25 % (ref 13–44)
MCH RBC QN AUTO: 31.8 PG (ref 26.1–32.9)
MCHC RBC AUTO-ENTMCNC: 34.8 G/DL (ref 31.4–35)
MCV RBC AUTO: 91.3 FL (ref 82–102)
MONOCYTES # BLD: 0.46 K/UL (ref 0.1–1.3)
MONOCYTES NFR BLD: 5.8 % (ref 4–12)
NEUTS SEG # BLD: 5.44 K/UL (ref 1.7–8.2)
NEUTS SEG NFR BLD: 67.9 % (ref 43–78)
NRBC # BLD: 0 K/UL (ref 0–0.2)
PLATELET # BLD AUTO: 241 K/UL (ref 150–450)
PMV BLD AUTO: 11.7 FL (ref 9.4–12.3)
POTASSIUM SERPL-SCNC: 4.1 MMOL/L (ref 3.5–5.1)
PROT SERPL-MCNC: 7.7 G/DL (ref 6.3–8.2)
RBC # BLD AUTO: 4 M/UL (ref 4.05–5.2)
SODIUM SERPL-SCNC: 141 MMOL/L (ref 136–145)
TRIGL SERPL-MCNC: 37 MG/DL (ref 0–150)
TSH W FREE THYROID IF ABNORMAL: 1.29 UIU/ML (ref 0.27–4.2)
VLDLC SERPL CALC-MCNC: 7 MG/DL (ref 6–23)
WBC # BLD AUTO: 8 K/UL (ref 4.3–11.1)

## 2025-06-12 ENCOUNTER — RESULTS FOLLOW-UP (OUTPATIENT)
Dept: FAMILY MEDICINE CLINIC | Facility: CLINIC | Age: 60
End: 2025-06-12

## 2025-06-26 ENCOUNTER — OFFICE VISIT (OUTPATIENT)
Dept: FAMILY MEDICINE CLINIC | Facility: CLINIC | Age: 60
End: 2025-06-26
Payer: COMMERCIAL

## 2025-06-26 VITALS
OXYGEN SATURATION: 100 % | HEIGHT: 67 IN | RESPIRATION RATE: 19 BRPM | TEMPERATURE: 96.8 F | BODY MASS INDEX: 32.65 KG/M2 | WEIGHT: 208 LBS | SYSTOLIC BLOOD PRESSURE: 114 MMHG | HEART RATE: 70 BPM | DIASTOLIC BLOOD PRESSURE: 81 MMHG

## 2025-06-26 DIAGNOSIS — K21.9 GASTROESOPHAGEAL REFLUX DISEASE, UNSPECIFIED WHETHER ESOPHAGITIS PRESENT: ICD-10-CM

## 2025-06-26 DIAGNOSIS — I10 ESSENTIAL HYPERTENSION: Primary | ICD-10-CM

## 2025-06-26 DIAGNOSIS — F41.9 ANXIETY: ICD-10-CM

## 2025-06-26 PROCEDURE — 3074F SYST BP LT 130 MM HG: CPT

## 2025-06-26 PROCEDURE — 99214 OFFICE O/P EST MOD 30 MIN: CPT

## 2025-06-26 PROCEDURE — 3079F DIAST BP 80-89 MM HG: CPT

## 2025-06-26 RX ORDER — VALSARTAN AND HYDROCHLOROTHIAZIDE 80; 12.5 MG/1; MG/1
1 TABLET, FILM COATED ORAL DAILY
Qty: 90 TABLET | Refills: 1 | Status: SHIPPED | OUTPATIENT
Start: 2025-06-26

## 2025-06-26 RX ORDER — SUCRALFATE 1 G/1
1 TABLET ORAL 2 TIMES DAILY PRN
Qty: 60 TABLET | Refills: 2 | Status: SHIPPED | OUTPATIENT
Start: 2025-06-26

## 2025-06-26 RX ORDER — ESCITALOPRAM OXALATE 10 MG/1
10 TABLET ORAL DAILY
Qty: 90 TABLET | Refills: 1 | Status: SHIPPED | OUTPATIENT
Start: 2025-06-26

## 2025-06-26 RX ORDER — HYDROXYZINE HYDROCHLORIDE 25 MG/1
25 TABLET, FILM COATED ORAL EVERY 8 HOURS PRN
Qty: 90 TABLET | Refills: 2 | Status: SHIPPED | OUTPATIENT
Start: 2025-06-26

## 2025-06-26 RX ORDER — BUPROPION HYDROCHLORIDE 300 MG/1
300 TABLET ORAL EVERY MORNING
Qty: 90 TABLET | Refills: 1 | Status: SHIPPED | OUTPATIENT
Start: 2025-06-26

## 2025-06-26 RX ORDER — ESOMEPRAZOLE MAGNESIUM 40 MG/1
40 CAPSULE, DELAYED RELEASE ORAL
Qty: 90 CAPSULE | Refills: 1 | Status: SHIPPED | OUTPATIENT
Start: 2025-06-26

## 2025-06-26 ASSESSMENT — ENCOUNTER SYMPTOMS
ABDOMINAL PAIN: 1
NAUSEA: 1
CONSTIPATION: 1
CHEST TIGHTNESS: 1
BACK PAIN: 0
TROUBLE SWALLOWING: 0
DIARRHEA: 0
WHEEZING: 0
PHOTOPHOBIA: 0
ANAL BLEEDING: 0
BLOOD IN STOOL: 0
SHORTNESS OF BREATH: 0

## 2025-06-26 NOTE — ASSESSMENT & PLAN NOTE
Chronic, at goal (stable), continue current treatment plan, medication adherence emphasized, and lifestyle modifications recommended.    Orders:    valsartan-hydroCHLOROthiazide (DIOVAN-HCT) 80-12.5 MG per tablet; Take 1 tablet by mouth daily

## 2025-06-26 NOTE — ASSESSMENT & PLAN NOTE
Chronic, worsening (exacerbation), changes made today: PRN atarax added to daily regimen.     Orders:    buPROPion (WELLBUTRIN XL) 300 MG extended release tablet; Take 1 tablet by mouth every morning    escitalopram (LEXAPRO) 10 MG tablet; Take 1 tablet by mouth daily    hydrOXYzine HCl (ATARAX) 25 MG tablet; Take 1 tablet by mouth every 8 hours as needed for Anxiety

## 2025-06-26 NOTE — PROGRESS NOTES
dealing with increased stress and having some intermittent bouts of anxiety / panic; she has taken atarax that was previously prescribed with relief in symptoms.     -GERD/Gastritis: well controlled on Nexium 40 mg daily and pepcid PRN. Recent egd which showed chronic gastritis. Follow with GI- Dr. Luu. Currently having some breakthrough symptoms despite PPI. Advised to call GI provider if symptoms persist.      -Lifestyle: has been working diligently on improving diet and walking regularly. Avoiding carbs, sugars, and processed food; her gastritis acts up if she doesn't cook at home and eats too much junk food. She has lost about 50 lbs. Walking a mile and biking 15 minutes regularly as well.     -HM: colonoscopy scheduled next year with GI ass[facility]. Mammogram due 08/2025. Dexa completed 08/2024. Hysterectomy.         Review of Systems   Constitutional:  Negative for activity change, appetite change, chills, diaphoresis, fatigue, fever and unexpected weight change.   HENT:  Negative for congestion, ear pain, postnasal drip and trouble swallowing.    Eyes:  Negative for photophobia and visual disturbance.   Respiratory:  Positive for chest tightness (with anx attacks). Negative for shortness of breath and wheezing.    Cardiovascular:  Negative for chest pain, palpitations and leg swelling.   Gastrointestinal:  Positive for abdominal pain, constipation and nausea. Negative for anal bleeding, blood in stool and diarrhea.        Recent EGD + for gastritis   Genitourinary:  Negative for difficulty urinating, dysuria and hematuria.   Musculoskeletal:  Negative for back pain and neck pain.   Skin:  Negative for rash.   Neurological:  Negative for dizziness, weakness and numbness.   Psychiatric/Behavioral:  Negative for dysphoric mood, self-injury, sleep disturbance and suicidal ideas. The patient is nervous/anxious.           Objective   Physical Exam  Constitutional:       Appearance: Normal appearance.   HENT:      Head:

## 2025-06-26 NOTE — ASSESSMENT & PLAN NOTE
Chronic, worsening (exacerbation), changes made today: recent EGD + for chronic gastritis; advised pt follow up with GI regarding medication regimen changes, but will trial carafate in the mean time. Advised on red flag s&s that would require urgent f/u or ER visit.     Orders:    esomeprazole (NEXIUM) 40 MG delayed release capsule; Take 1 capsule by mouth every morning (before breakfast)    sucralfate (CARAFATE) 1 GM tablet; Take 1 tablet by mouth 2 times daily as needed (gastritis)

## 2025-06-30 ENCOUNTER — OFFICE VISIT (OUTPATIENT)
Dept: ORTHOPEDIC SURGERY | Age: 60
End: 2025-06-30
Payer: COMMERCIAL

## 2025-06-30 DIAGNOSIS — M17.12 PRIMARY OSTEOARTHRITIS OF LEFT KNEE: ICD-10-CM

## 2025-06-30 DIAGNOSIS — M17.11 PRIMARY OSTEOARTHRITIS OF RIGHT KNEE: Primary | ICD-10-CM

## 2025-06-30 PROCEDURE — 99213 OFFICE O/P EST LOW 20 MIN: CPT | Performed by: PHYSICIAN ASSISTANT

## 2025-08-26 DIAGNOSIS — M17.12 PRIMARY OSTEOARTHRITIS OF LEFT KNEE: ICD-10-CM

## 2025-08-26 DIAGNOSIS — M17.11 PRIMARY OSTEOARTHRITIS OF RIGHT KNEE: Primary | ICD-10-CM

## 2025-08-27 ENCOUNTER — OFFICE VISIT (OUTPATIENT)
Dept: ORTHOPEDIC SURGERY | Age: 60
End: 2025-08-27

## 2025-08-27 DIAGNOSIS — M17.12 PRIMARY OSTEOARTHRITIS OF LEFT KNEE: ICD-10-CM

## 2025-08-27 DIAGNOSIS — M17.11 PRIMARY OSTEOARTHRITIS OF RIGHT KNEE: Primary | ICD-10-CM

## (undated) DEVICE — PRECISION THIN (9.0 X 0.38 X 31.0MM)

## (undated) DEVICE — CONTAINER COLL/TRNSPRT BX BRST -- E-Z-EM CAT 8390

## (undated) DEVICE — SUTURE NONABSORBABLE MONOFILAMENT 4-0 PS-2 18 IN BLU PROLENE 8682H

## (undated) DEVICE — Z CONVERTED USE 2421973 CONTAINER SPEC 60/30ML 10% FRMLN POLYPR PREFIL

## (undated) DEVICE — Device

## (undated) DEVICE — HAND PACK: Brand: MEDLINE INDUSTRIES, INC.

## (undated) DEVICE — DISPOSABLE BIPOLAR CODE, 12' (3.66 M): Brand: CONMED

## (undated) DEVICE — SUTURE VCRL SZ 3-0 L18IN ABSRB UD L26MM SH 1/2 CIR J864D

## (undated) DEVICE — PADDING CAST W3INXL4YD COT BLEND MIC PLEAT UNDERCAST SPEC

## (undated) DEVICE — BANDAGE COMPR L5YDXW2IN FOAM CO FLX

## (undated) DEVICE — SUTURE VICRYL SZ 2-0 L27IN ABSRB UD L26MM CT-2 1/2 CIR J269H

## (undated) DEVICE — GOWN,ECLIPSE,POLYRNF,BRTHSLV,XL,30/CS: Brand: MEDLINE

## (undated) DEVICE — APPLIER CLP L9.38IN M LIG TI DISP STR RNG HNDL LIGACLP

## (undated) DEVICE — DRESSING PETRO W3XL8IN OIL EMUL N ADH GZ KNIT IMPREG CELOS

## (undated) DEVICE — GLOVE SURG SZ 65 L12IN FNGR THK79MIL GRN LTX FREE

## (undated) DEVICE — SUTURE PERMAHAND SZ 2-0 L18IN NONABSORBABLE BLK L26MM PS 1588H

## (undated) DEVICE — 2000CC GUARDIAN II: Brand: GUARDIAN

## (undated) DEVICE — PADDING CAST W2INXL4YD ST COT COHESIVE HND TEARABLE SPEC

## (undated) DEVICE — BNDG,ELSTC,MATRIX,STRL,3"X5YD,LF,HOOK&LP: Brand: MEDLINE

## (undated) DEVICE — BANDAGE,ELASTIC,ESMARK,STERILE,4"X9',LF: Brand: MEDLINE

## (undated) DEVICE — SUTURE FIBERWIRE SZ 2 W/ TAPERED NEEDLE BLUE L38IN NONABSORB BLU L26.5MM 1/2 CIRCLE AR7200

## (undated) DEVICE — SOLUTION IV 1000ML 0.9% SOD CHL

## (undated) DEVICE — DRAPE TWL SURG 16X26IN BLU ORB04] ALLCARE INC]

## (undated) DEVICE — BANDAGE GZ W2XL75IN ST RAYON POLY CNFRM STRTCH LTWT

## (undated) DEVICE — GARMENT,MEDLINE,DVT,INT,CALF,MED, GEN2: Brand: MEDLINE

## (undated) DEVICE — DRESSING,GAUZE,XEROFORM,CURAD,1"X8",ST: Brand: CURAD

## (undated) DEVICE — BNDG,ELSTC,MATRIX,STRL,2"X5YD,LF,HOOK&LP: Brand: MEDLINE

## (undated) DEVICE — DRILL 4.5MM FOR 5.5MM ANCHOR SL-PLUS

## (undated) DEVICE — SURGICAL PROCEDURE PACK BASIC ST FRANCIS

## (undated) DEVICE — GOWN,SIRUS,NONRNF,SETINSLV,XL,20/CS: Brand: MEDLINE

## (undated) DEVICE — WIRE CUTTER, STERILE (WCS144): Brand: CENTURION MEDICAL PRODUCTS CORP

## (undated) DEVICE — KIT INSTR W/ 2.4MM GUIDEPIN SUT PASS WIRE NO2 FIBERWIRE

## (undated) DEVICE — FOAM BUMP ROUND LARGE: Brand: MEDLINE INDUSTRIES, INC.

## (undated) DEVICE — DRAIN SURG 19FR 100% SIL RADPQ RND CHN FULL FLUT

## (undated) DEVICE — ZIMMER® STERILE DISPOSABLE TOURNIQUET CUFF WITH PLC, DUAL PORT, SINGLE BLADDER, 18 IN. (46 CM)

## (undated) DEVICE — GLOVE SURG SZ 65 CRM LTX FREE POLYISOPRENE POLYMER BEAD ANTI

## (undated) DEVICE — REM POLYHESIVE ADULT PATIENT RETURN ELECTRODE: Brand: VALLEYLAB

## (undated) DEVICE — FOOT DR TOLLISON & WOMACK: Brand: MEDLINE INDUSTRIES, INC.

## (undated) DEVICE — DRAPE,TOP,102X53,STERILE: Brand: MEDLINE

## (undated) DEVICE — STRIP,CLOSURE,WOUND,MEDI-STRIP,1/2X4: Brand: MEDLINE

## (undated) DEVICE — SOLUTION IRRIG 1000ML 0.9% SOD CHL USP POUR PLAS BTL

## (undated) DEVICE — NEEDLE HYPO 21GA L1.5IN INTRAMUSCULAR S STL LATCH BVL UP

## (undated) DEVICE — GLOVE ORANGE PI 7 1/2   MSG9075

## (undated) DEVICE — 3M™ TEGADERM™ TRANSPARENT FILM DRESSING FRAME STYLE, 1626W, 4 IN X 4-3/4 IN (10 CM X 12 CM), 50/CT 4CT/CASE: Brand: 3M™ TEGADERM™

## (undated) DEVICE — APPLIER RMFG CLP LIG MED 23.8 --

## (undated) DEVICE — SHEET, DRAPE, SPLIT, STERILE: Brand: MEDLINE

## (undated) DEVICE — APPLIER CLP AUTO MED 9.75 IN TI SURGCLP SUPER INTLOK 20 DISP

## (undated) DEVICE — TRAY PREP DRY W/ PREM GLV 2 APPL 6 SPNG 2 UNDPD 1 OVERWRAP

## (undated) DEVICE — SPLINT THMB W4XL30IN FBRGLS PD PRECUT LTWT DURABLE FAST SET

## (undated) DEVICE — SUT PROL 4-0 18IN P3 BLU --

## (undated) DEVICE — GLOVE SURG SZ 8 L12IN FNGR THK79MIL GRN LTX FREE

## (undated) DEVICE — GOWN,PREVENTION PLUS,XLN/XL,ST,24/CS: Brand: MEDLINE